# Patient Record
Sex: MALE
[De-identification: names, ages, dates, MRNs, and addresses within clinical notes are randomized per-mention and may not be internally consistent; named-entity substitution may affect disease eponyms.]

---

## 2017-04-03 NOTE — C.PDOC
History Of Present Illness


67 year old male with PMH DM, HTN, alcohol abuse presents to the ED with 

complaints of swelling to his legs and face for the past 3 days. Patient states 

he occasionally feels dizzy, headache and a bit nauseous. According to daughter

, he had kidney problems many years ago but it has since self-resolved. His 

blood sugar is at baseline at 180. Denies vomiting, chest pain, SOB, rash, 

itching, throat swelling, lip or tongue swelling, or any other complaints at 

this time. NO known allergens, new medication or food. (-) change in vision (-) 

photophobia 





Time Seen by Provider: 17 16:28


Chief Complaint (Nursing): Lower Extremity Problem/Injury


History Per: Patient, Family (Daughter acting as )


History/Exam Limitations: language barrier


Onset/Duration Of Symptoms: Days


Current Symptoms Are (Timing): Still Present


Severity: Mild





Past Medical History


Reviewed: Historical Data, Nursing Documentation, Vital Signs


Vital Signs: 


 Last Vital Signs











Temp  98.4 F   17 17:36


 


Pulse  70   17 17:36


 


Resp  20   17 17:36


 


BP  155/88 H  17 17:36


 


Pulse Ox  97   17 13:43














- Medical History


PMH: Anemia, Diabetes, HTN, Pancreatitis


Surgical History: Appendectomy


Family History: States: Unknown Family Hx





- Social History


Hx Tobacco Use: No


Hx Alcohol Use: Yes


Hx Substance Use: No





- Immunization History


Hx Tetanus Toxoid Vaccination: No


Hx Influenza Vaccination: No


Hx Pneumococcal Vaccination: No





Review Of Systems


Except As Marked, All Systems Reviewed And Found Negative.


Constitutional: Positive for: Other (+Swelling to hands and face).  Negative for

: Fever, Chills


Cardiovascular: Negative for: Chest Pain, Palpitations


Respiratory: Negative for: Shortness of Breath


Gastrointestinal: Positive for: Nausea.  Negative for: Vomiting, Abdominal Pain


Skin: Negative for: Rash


Neurological: Positive for: Dizziness.  Negative for: Weakness, Numbness





Physical Exam





- Physical Exam


Appears: Non-toxic, No Acute Distress


Skin: Normal Color, Warm, Dry, No Rash


Head: Atraumatic, Normacephalic, Other (No extensive facial swelling noted)


Eye(s): bilateral: Normal Inspection, EOMI


Nose: Normal


Oral Mucosa: Moist


Neck: Supple


Chest: Symmetrical, No Deformity


Cardiovascular: Rhythm Regular


Respiratory: Normal Breath Sounds, No Accessory Muscle Use, No Rales, No Rhonchi

, No Wheezing


Gastrointestinal/Abdominal: Soft


Extremity: Normal ROM, Pedal Edema (+Pitting pedal edema), No Deformity


Pulses: Left Radial: Normal, Right Radial: Normal


Neurological/Psych: Oriented x3, Normal Speech, Normal Cognition, Normal Motor, 

Normal Sensation





ED Course And Treatment





- Laboratory Results


Result Diagrams: 


 17 08:18





 17 08:18


ECG: Interpreted By Me, Viewed By Me


ECG Rhythm: Sinus Rhythm, Nonspecific Changes


Rate From EC


O2 Sat by Pulse Oximetry: 97 (Room air)


Pulse Ox Interpretation: Normal


Progress Note: CXR, EKG, Blood work, and Urinalysis ordered and reviewed.  Case 

discussed with Dr. Fung who agreed with plan and admission.





Disposition





- Disposition


Disposition: HOSPITALIZED


Disposition Time: 17:00


Condition: STABLE





- Clinical Impression


Clinical Impression: 


 ETOH abuse, Thrombocytopenia, Leg edema, Diabetes, Dizziness





- PA / NP / Resident Statement


MD/DO has reviewed & agrees with the documentation as recorded.





- Scribe Statement


The provider has reviewed the documentation as recorded by the Scribe


Kevyn Jaffe.





All medical record entries made by the Scribe were at my direction and 

personally dictated by me. I have reviewed the chart and agree that the record 

accurately reflects my personal performance of the history, physical exam, 

medical decision making, and the department course for this patient. I have 

also personally directed, reviewed, and agree with the discharge instructions 

and disposition.

## 2017-04-03 NOTE — CP.PCM.HP
History of Present Illness





- History of Present Illness


History of Present Illness: 


CC: "leg swelling"


67 year old male with PMHx of DM, HTN, alcohol abuse, presents to the ED with 

complaints of facial and LE swelling. Swelling began 3-4 days ago and has 

progressively become worse. He states this happened once 6 months ago and went 

to see his PMD, Dr. Fung. He does not report any new medications or new foods. 

He denies chest pain, SOB, cough or palpitations. He denies pruritus, sore 

throat or throat swelling. Admits to occasional ALEXANDRA and can only climb half way 

up a flight of stairs before having to stop to catch his breath. He sleeps with 

1-2 pillows a night, but reports he is able to lay flat. He denies waking up in 

the middle of the night gasping for air. Denies abdominal pain, nausea, vomiting

, diarrhea, and is sometimes constipated. He also reports he has been 

experiencing headaches for the past month. Headaches wake him from sleep at 

around 3 am and last 3-4 hours. They have been occurring for 3-4 times a week 

and are relieved with Motrin. He denies photophobia, history of migraines, 

lacrimation, neck pain, blurry vision. 





PMHx: ETOH abuse, ETOH Pancreatitis, DM, HTN


Medications: Glimiperide 4 mg PO BID, Lisinopril 20 mg PO daily, Lasix 40 mg PO 

daily, Propranalol 20 mg PO TID, KCl 20 mg PO BID, Humulin N sliding scale. 


PSHx: appendectomy


Family hx: Both parents , father with DM. 


Social hx: Drinks about 6-9 shots of vodka daily. Last drink was last night at 

10 pm. Former smoker, quit 20 yrs ago, denies drug use. Lives with family, 

retired. 


Allergies: NKDA. 


PMD: Dr. Fung





Present on Admission





- Present on Admission


Any Indicators Present on Admission: No





Review of Systems





- Constitutional


Constitutional: absent: Chills, Fatigue, Fever





- EENT


Eyes: absent: Blurred Vision, Change in Vision, Irritation, Photophobia


Ears: Dizziness


Nose/Mouth/Throat: absent: Sore Throat, Neck Pain





- Cardiovascular


Cardiovascular: Dyspnea on Exertion, Leg Edema, Pedal Edema.  absent: Chest Pain

, Chest Pain at Rest, Dyspnea





- Respiratory


Respiratory: Dyspnea on Exertion.  absent: Cough, Dyspnea





- Gastrointestinal


Gastrointestinal: Constipation.  absent: Abdominal Pain, Diarrhea, Nausea, 

Vomiting





- Genitourinary


Genitourinary: absent: Difficulty Urinating, Dysuria, Urinary Frequency, 

Urinary Urgency





- Musculoskeletal


Musculoskeletal: absent: Atrophy, Back Pain, Numbness, Tingling





- Integumentary


Integumentary: absent: Bleeding Lesions, Skin Pain, Wounds





- Neurological


Neurological: Dizziness, Headaches.  absent: Numbness, Paresthesias, Syncope, 

Tingling





- Psychiatric


Psychiatric: absent: Confusion, Depression





- Endocrine


Endocrine: absent: Fatigue, Palpitations, Polydipsia, Polyphagia, Polyuria





Past Patient History





- Infectious Disease


Hx of Infectious Diseases: None





- Past Medical History & Family History


Past Medical History?: Yes





- Past Social History


Smoking Status: Never Smoked





- CARDIAC


Hx Hypertension: Yes





- PULMONARY


Hx Respiratory Disorders: No





- NEUROLOGICAL


Hx Neurological Disorder: No





- HEENT


Hx HEENT Problems: No





- RENAL


Hx Chronic Kidney Disease: No





- ENDOCRINE/METABOLIC


Hx Endocrine Disorders: Yes


Hx Diabetes Mellitus Type 1: Yes


Hx Diabetes Mellitus Type 2: Yes





- HEMATOLOGICAL/ONCOLOGICAL


Hx Anemia: Yes





- INTEGUMENTARY


Hx Dermatological Problems: No





- MUSCULOSKELETAL/RHEUMATOLOGICAL


Hx Musculoskeletal Disorders: No





- GASTROINTESTINAL


Hx Pancreatitis: Yes





- GENITOURINARY/GYNECOLOGICAL


Hx Genitourinary Disorders: No





- PSYCHIATRIC


Hx Substance Use: No





- SURGICAL HISTORY


Hx Appendectomy: Yes





- ANESTHESIA


Hx Anesthesia: Yes


Hx Anesthesia Reactions: No





Meds


Allergies/Adverse Reactions: 


 Allergies











Allergy/AdvReac Type Severity Reaction Status Date / Time


 


No Known Allergies Allergy   Verified 17 15:40














Physical Exam





- Constitutional


Appears: No Acute Distress





- Head Exam


Head Exam: NORMAL INSPECTION, NORMOCEPHALIC





- Eye Exam


Eye Exam: EOMI, Scleral icterus


Pupil Exam: PERRL


Additional comments: 


periorbital erythema





- ENT Exam


ENT Exam: Mucous Membranes Moist, Normal Oropharynx





- Neck Exam


Neck exam: Positive for: Full Rom, Normal Inspection





- Respiratory Exam


Respiratory Exam: Clear to Auscultation Bilateral.  absent: Rales, Rhonchi, 

Wheezes





- Cardiovascular Exam


Cardiovascular Exam: REGULAR RHYTHM, +S1, +S2





- GI/Abdominal Exam


GI & Abdominal Exam: Normal Bowel Sounds, Soft.  absent: Distended, Tenderness





- Extremities Exam


Extremities exam: Positive for: pedal edema, tenderness (righ le), pedal pulses 

present





- Back Exam


Back exam: NORMAL INSPECTION





- Neurological Exam


Neurological exam: Alert, CN II-XII Intact, Oriented x3





- Psychiatric Exam


Psychiatric exam: Normal Affect, Normal Mood





- Skin


Skin Exam: Dry, Normal Color, Warm





Results





- Vital Signs


Recent Vital Signs: 





 Last Vital Signs











Temp  98.3 F   17 15:45


 


Pulse  78   17 15:45


 


Resp  20   17 15:45


 


BP  178/97 H  17 15:45


 


Pulse Ox  97   17 18:46














- Labs


Result Diagrams: 


 17 18:05





 17 18:05





Assessment & Plan


(1) Leg edema


Assessment and Plan: 


Patient denies history of heart failure. Patient given Lasix 40 in the ED. 


EKG on admission shows NSR72 bpm. 


BNP  604 (152 in )


Albumin 3.8 (normal)


f/u ECHO, none on file. 





Start the following medications:


Lasix 40 mg IVP BID


Propranolol 20 mg PO TID


Hold ACEi for NITISH





I/O's


Daily weights


Low Sodium Diet


Status: Acute





(2) Headache


Assessment and Plan: 


f/u head CT


control BP


Status: Acute





(3) ETOH abuse


Assessment and Plan: 


Librium taper


Librium 25 mg IVP PRN Q4H


Ativan 


Thiamine PO daily 


MV PO daily


Folic Acid PO daily





Monitor for withdrawal. Last drink 10 pm on 17.


Aspiration precautions


Seizure precautions


Status: Chronic





(4) Thrombocytopenia


Assessment and Plan: 


Likely secondary to BM suppression due to alcohol abuse. 


Status: Acute





(5) Bilirubinemia


Assessment and Plan: 


DARI Galarza: 2.1


Patient with history of 2 pancreatic masses as seen on MRCP  with need for 

3 month follow up. 


f/u direct and total bili


f/u abd US





Status: Acute





(6) Neutropenia


Assessment and Plan: 


Likely secondary to BM suppression due to alcohol abuse. 


Status: Acute





(7) Elevated LFTs


Status: Acute





(8) Creatinine elevation


Assessment and Plan: 


NS at 100 cc/hr


f/u CMP in the AM


Status: Acute





(9) Diabetes


Assessment and Plan: 


Amyril 4 mg PO BID


ISS


ACCUCHECKS


f/u Hgb A1C


Status: Chronic





(10) HTN (hypertension)


Assessment and Plan: 


Propranolol 20 mg PO TID


Hold ACEi for NITISH


Continue Lasix


Status: Chronic





(11) Prophylactic measure


Assessment and Plan: 


SCDs contraindicated


Protonix 40 mg PO daily


May start chemical AC once head CT shows no IC bleed. 





All orders as per Dr. Fung


Status: Acute

## 2017-04-04 NOTE — CP.PCM.DIS
Provider





- Provider


Date of Admission: 


04/03/17 18:40





Attending physician: 


Marlo Fung Jr, MD





Primary care physician: 


Antonieta   


Time Spent in preparation of Discharge (in minutes): 29





Hospital Course





- Lab Results


Lab Results: 


 Micro Results





04/03/17 Unknown   Urine   Urine Culture - Preliminary


                                No growth.





 Most Recent Lab Values











WBC  2.8 K/uL (4.8-10.8)  L  04/04/17  08:18    


 


RBC  3.75 Mil/uL (4.40-5.90)  L  04/04/17  08:18    


 


Hgb  11.1 g/dL (12.0-18.0)  L  04/04/17  08:18    


 


Hct  34.5 % (35.0-51.0)  L  04/04/17  08:18    


 


MCV  92.2 fL (80.0-94.0)   04/04/17  08:18    


 


MCH  29.7 pg (27.0-31.0)   04/04/17  08:18    


 


MCHC  32.2 g/dL (33.0-37.0)  L  04/04/17  08:18    


 


RDW  16.1 % (11.5-14.5)  H  04/04/17  08:18    


 


Plt Count  82 K/uL (130-400)  L  04/04/17  08:18    


 


MPV  8.3 fL (7.2-11.7)   04/04/17  08:18    


 


Neut % (Auto)  57.8 % (50.0-75.0)   04/04/17  08:18    


 


Lymph % (Auto)  28.3 % (20.0-40.0)   04/04/17  08:18    


 


Mono % (Auto)  11.4 % (0.0-10.0)  H  04/04/17  08:18    


 


Eos % (Auto)  1.2 % (0.0-4.0)   04/04/17  08:18    


 


Baso % (Auto)  1.3 % (0.0-2.0)   04/04/17  08:18    


 


Neut #  1.6 K/uL (1.8-7.0)  L  04/04/17  08:18    


 


Lymph #  0.8 K/uL (1.0-4.3)  L  04/04/17  08:18    


 


Mono #  0.3 K/uL (0.0-0.8)   04/04/17  08:18    


 


Eos #  0.0 K/uL (0.0-0.7)   04/04/17  08:18    


 


Baso #  0.0 K/uL (0.0-0.2)   04/04/17  08:18    


 


Differential Comment     04/03/17  18:05    


 


Sodium  130 mmol/L (132-148)  L  04/04/17  08:18    


 


Potassium  3.9 mmol/L (3.6-5.2)   04/04/17  08:18    


 


Chloride  85 mmol/L ()  L  04/04/17  08:18    


 


Carbon Dioxide  30 mmol/L (22-30)   04/04/17  08:18    


 


Anion Gap  18  (10-20)   04/04/17  08:18    


 


BUN  16 mg/dL (9-20)   04/04/17  08:18    


 


Creatinine  1.7 MG/DL (0.8-1.5)  H  04/04/17  08:18    


 


Est GFR ( Amer)  49   04/04/17  08:18    


 


Est GFR (Non-Af Amer)  40   04/04/17  08:18    


 


POC Glucose (mg/dL)  304 mg/dL ()  H  04/04/17  11:31    


 


Random Glucose  169 mg/dL ()  H  04/04/17  08:18    


 


Hemoglobin A1c  8.0 % (4.2-6.5)  H  04/04/17  08:18    


 


Calcium  8.5 mg/dl (8.6-10.4)  L  04/04/17  08:18    


 


Total Bilirubin  2.1 mg/dL (0.2-1.3)  H  04/04/17  08:18    


 


Direct Bilirubin  0.8 mg/dL (0.0-0.4)  H  04/04/17  08:18    


 


AST  113 U/L (17-59)  H D 04/04/17  08:18    


 


ALT  69 U/L (21-72)   04/04/17  08:18    


 


Alkaline Phosphatase  70 U/L ()   04/04/17  08:18    


 


Total Creatine Kinase  91 U/L ()   04/03/17  18:05    


 


CK-MB (Mass)  1.65 ng/mL (0.0-3.38)   04/03/17  18:05    


 


Troponin I  0.0200 ng/mL (0.00-0.120)   04/03/17  18:05    


 


NT-Pro-B Natriuret Pep  604 pg/mL (0-900)   04/03/17  18:05    


 


Total Protein  6.1 g/dL (6.3-8.3)  L  04/04/17  08:18    


 


Albumin  3.3 g/dL (3.5-5.0)  L  04/04/17  08:18    


 


Globulin  2.8 gm/dL (2.2-3.9)   04/04/17  08:18    


 


Albumin/Globulin Ratio  1.2  (1.0-2.1)   04/04/17  08:18    


 


Triglycerides  93 mg/dL (0-149)   04/04/17  08:18    


 


Cholesterol  152 mg/dL (0-199)   04/04/17  08:18    


 


LDL Cholesterol Direct  47 mg/dL (0-129)   04/04/17  08:18    


 


HDL Cholesterol  87 mg/dL (30-70)  H  04/04/17  08:18    


 


TSH 3rd Generation  3.48 mIU/L (0.46-4.68)   04/04/17  08:18    


 


Urine Color  Straw  (YELLOW)   04/03/17  18:05    


 


Urine Clarity  Clear  (Clear)   04/03/17  18:05    


 


Urine pH  9.0  (5.0-8.0)   04/03/17  18:05    


 


Ur Specific Gravity  1.004  (1.003-1.030)   04/03/17  18:05    


 


Urine Protein  Negative mg/dL (NEGATIVE)   04/03/17  18:05    


 


Urine Glucose (UA)  Normal mg/dL (Normal)   04/03/17  18:05    


 


Urine Ketones  Trace mg/dL (NEGATIVE)   04/03/17  18:05    


 


Urine Blood  1+  (NEGATIVE)  H  04/03/17  18:05    


 


Urine Nitrate  Negative  (NEGATIVE)   04/03/17  18:05    


 


Urine Bilirubin  Negative  (NEGATIVE)   04/03/17  18:05    


 


Urine Urobilinogen  2.0 mg/dL (0.2-1.0)   04/03/17  18:05    


 


Ur Leukocyte Esterase  Neg Dahlia/uL (Negative)   04/03/17  18:05    


 


Urine RBC (Auto)  7 /hpf (0-3)  H  04/03/17  18:05    














- Hospital Course


Hospital Course: 


On hospital admission





67 year old male with PMHx of DM, HTN, alcohol abuse, presents to the ED with 

complaints of facial and LE swelling. Swelling began 3-4 days ago and has 

progressively become worse. He states this happened once 6 months ago and went 

to see his PMD, Dr. Fung. He does not report any new medications or new foods. 

He denies chest pain, SOB, cough or palpitations. He denies pruritus, sore 

throat or throat swelling. Admits to occasional ALEXANDRA and can only climb half way 

up a flight of stairs before having to stop to catch his breath. He sleeps with 

1-2 pillows a night, but reports he is able to lay flat. He denies waking up in 

the middle of the night gasping for air. Denies abdominal pain, nausea, vomiting

, diarrhea, and is sometimes constipated. He also reports he has been 

experiencing headaches for the past month. Headaches wake him from sleep at 

around 3 am and last 3-4 hours. They have been occurring for 3-4 times a week 

and are relieved with Motrin. He denies photophobia, history of migraines, 

lacrimation, neck pain, blurry vision. 





On hospital course





Pt admitted with lower extremity edema and headache, complicated by hx of 

alcohol abuse. Pt denied and hx of CHF but work up was started with ordering an 

echo and reviewing labs. He was diuresed and started on propranolol. Lower 

extremity doplers were also ordered to r/o any DVT. A head CT was ordered to r/

o any organic causes of his headache but was normal. The following day the pt 

reported that his headache had resolved and he had no more complaints. Pt was 

discharged in stable condition with instructions to follow up with Dr Fung in 

his office where all results that were still pending could be reviewed. 





Diagnoses





Headache


Lower extremity edema


Etoh abuse


Thrombocytopenia


Neutropenia


Bilirubinemia


Diabetes 


HTN





- Date & Time of H&P


Date of H&P: 04/03/17


Time of H&P: 20:17





Discharge Exam





- Head Exam


Head Exam: NORMAL INSPECTION, NORMOCEPHALIC





- Eye Exam


Eye Exam: Normal appearance


Pupil Exam: PERRL





- ENT Exam


ENT Exam: Mucous Membranes Moist





- Respiratory Exam


Respiratory Exam: Clear to PA & Lateral, UNREMARKABLE





- Cardiovascular Exam


Cardiovascular Exam: +S1, +S2





- GI/Abdominal Exam


GI & Abdominal Exam: Distended, Normal Bowel Sounds, Unremarkable.  absent: 

Tenderness





- Extremities Exam


Extremities exam: pedal edema (1+)





- Neurological Exam


Neurological exam: Alert, Oriented x3





- Skin


Skin Exam: Dry, Warm





Discharge Plan





- Follow Up Plan


Condition: GOOD


Disposition: HOME/ ROUTINE


Instructions:  Vertigo (DC), Dizziness (GEN), Edema (DC)


Additional Instructions: 


Follow up with Dr Fung in his office within the next couple of weeks and resume 

your home medications.


Referrals: 


Marlo Fung Jr., MD [Medical Doctor] -

## 2017-04-04 NOTE — US
HISTORY:

elevated tbilli, alcoholic



COMPARISON:

January 3, 2016.



TECHNIQUE:

Sonographic evaluation of the abdomen.



FINDINGS:



LIVER:

Measures 17.7 cm.  Hepatopedal blood flow. Fatty infiltration 

manifest ultrasonographically as increased echogenicity of the liver 

parenchyma. No mass. No intrahepatic bile duct dilatation.



GALLBLADDER:

Unremarkable. No gallstones.



COMMON BILE DUCT:

Measures 4.7 mm. No stones. No dilatation.



PANCREAS:

Obscured by overlying bowel gas. Non diagnostic assessment of the 

pancreas



RIGHT KIDNEY:

Measures 11.8 x 5.4cm. Normal echogenicity. No calculus, mass, or 

hydronephrosis.



LEFT KIDNEY:

Measures 11.7 x 5.8cm. Normal echogenicity. No calculus, mass, or 

hydronephrosis.



SPLEEN:

Normal in size and contour. No mass.



AORTA:

No aneurysmal dilatation. 



IVC:

Unremarkable. 



OTHER FINDINGS:

None. 



IMPRESSION:

No acute findings related to/accounting  for the clinical 

presentation. No significant interval change compared to the prior 

examination(s). Additional benign and/or incidental findings 

described above.

## 2017-04-04 NOTE — RAD
PROCEDURE:  CHEST RADIOGRAPH, 1 VIEW



HISTORY:

SOB



COMPARISON:

Comparison is made to the previous study dated 08/08/2015



FINDINGS:



LUNGS:

No evidence of new infiltrate or consolidation in the lungs



PLEURA:

No pneumothorax or pleural fluid seen.



CARDIOVASCULAR:

Normal.



OSSEOUS STRUCTURES:

No significant abnormalities.



VISUALIZED UPPER ABDOMEN:

Normal.



OTHER FINDINGS:

None. 



IMPRESSION:

No active disease.

## 2017-04-04 NOTE — VASCLAB
PROCEDURE:  Lower Extremity Venous Duplex Exam.



HISTORY:

le edema 



PRIORS:

None. 



TECHNIQUE:

Bilateral common femoral, femoral, popliteal and posterior tibial, 

peroneal and great saphenous veins were evaluated. Flow was assessed 

with color Doppler, compressibility, assessment of phasic flow and 

augmentation response.



Report prepared by   FRANCISCA Jack, RVT



FINDINGS:



RIGHT:

1. Common Femoral Vein: 



1.1. Compressibility - Fully compressible: Thrombus -  None : Flow - 

Phasic: Augmentation -Normal: Reflux - None.



2. Femoral Vein:



2.1. Compressibility - Fully compressible: Thrombus -  None : Flow - 

Phasic: Augmentation -Normal: Reflux - None.



3. Popliteal Vein: 



3.1. Compressibility - Fully compressible: Thrombus - None :  Flow - 

Phasic: Augmentation -Normal: Reflux - None.



4. Posterior Tibial Vein: 



4.1. Compressibility - Fully compressible: Thrombus -  None: Flow - 

Phasic: Augmentation -Normal: Reflux - None.



5. Peroneal Vein:



5.1. Compressibility - Fully compressible: Thrombus -  None: Flow - 

Phasic: Augmentation -Normal: Reflux - None.



6. Great Saphenous Vein:

6.1. Compressibility - Fully compressible: Thrombus - None: Flow - 

Phasic: Augmentation - Normal: Reflux - None.





LEFT:

1. Common Femoral Vein:



1.1.  Compressibility - Fully compressible: Thrombus -  None: Flow - 

Phasic: Augmentation -Normal: Reflux - None.



2. Femoral Vein:



2.1.  Compressibility - Fully compressible: Thrombus -  None: Flow - 

Phasic: Augmentation -Normal: Reflux - None.



3. Popliteal Vein:



3.1.  Compressibility - Fully compressible: Thrombus -  None : Flow - 

Phasic: Augmentation -Normal: Reflux - None.



4. Posterior Tibial Vein:



4.1.  Compressibility - Fully compressible: Thrombus -  None: Flow - 

Phasic: Augmentation -Normal: Reflux - None.



5. Peroneal Vein:



5.1.  Compressibility - Fully compressible: Thrombus -  None: Flow - 

Phasic: Augmentation -Normal: Reflux - None.



6. Great Saphenous Vein:

6.1.  Compressibility - Fully compressible: Thrombus -  None: Flow - 

Phasic: Augmentation - Normal: Reflux - None.





OTHER FINDINGS:  Right: None significant.



Left: None significant.



IMPRESSION:

Right: 



No evidence of deep or superficial vein thrombosis of the right lower 

extremity. Normal valve function noted of the right side.     



Left: 



No evidence of deep or superficial vein thrombosis of the left lower 

extremity. Normal valve function noted of the left side.

## 2017-04-04 NOTE — CT
PROCEDURE:  CT HEAD WITHOUT CONTRAST.



HISTORY:

headache, dizziness



COMPARISON:

Comparison is made to the previous study dated 07/16/2014 



TECHNIQUE:

Axial computed tomography images were obtained through the head/brain 

without intravenous contrast.  



Radiation dose:



Total exam DLP = 737.04 mGy-cm.



This CT exam was performed using one or more of the following dose 

reduction techniques: Automated exposure control, adjustment of the 

mA and/or kV according to patient size, and/or use of iterative 

reconstruction technique.



FINDINGS:



HEMORRHAGE:

No intracranial hemorrhage. 



BRAIN:

No mass effect or edema.  Moderate atrophy and mild chronic 

microvascular white matter ischemic disease are again noted.



VENTRICLES:

Unremarkable. No hydrocephalus. 



CALVARIUM:

Unremarkable.



PARANASAL SINUSES:

Re- demonstration of small mucosal retention cyst at the right renal 

sinus.



MASTOID AIR CELLS:

Unremarkable as visualized. No inflammatory changes.



OTHER FINDINGS:

None.



IMPRESSION:

No evidence of acute intracranial hemorrhage intracranial collection 

mass effect or midline shift.  No significant interval change 

compared to the previous exam.



Preliminary report was submitted by [facility].

## 2017-04-05 NOTE — CARD
--------------- APPROVED REPORT --------------





EKG Measurement

Heart [id number]

IN [id number]

[id number]

[id number]

[id number]



<Conclusion>

Normal sinus rhythm

Left axis deviation

Nonspecific ST and T wave abnormality

Abnormal ECG

## 2017-04-05 NOTE — CARD
--------------- APPROVED REPORT --------------





EXAM: Two-dimensional and M-mode echocardiogram with Doppler and 

color Doppler.



Other Information 

Quality : AverageRhythm : NSR



INDICATION

Dizziness and Vertigo 



RISK FACTORS

Hypertension 

Hyperlipidemia

Diabetes



M-Mode DIMENSIONS 

Left Atrium (MM)3.54   (2.5-4.0cm)IVSd1.11   (0.7-1.1cm)

Aortic Root3.58   (2.2-3.7cm)LVDd4.77   (4.0-5.6cm)

Aortic Cusp Exc.1.55   (1.5-2.0cm)PWd1.18   (0.7-1.1cm)

FS (%) 39   %LVDs2.92   (2.0-3.8cm)

LVEF (%)69   (>50%)



Mitral Valve

MV E Nwyavjza42.7cm/sMV A Qpxbccqr04.7cm/sE/A ratio1.0



TDI

E/Lateral E'0.0E/Medial E'0.0



Tricuspid Valve

TR Peak [id number]/sTR Peak [id number]



 LEFT VENTRICLE 

The left ventricle is normal size.

There is borderline concentric left ventricular hypertrophy.

Left ventricle systolic function is normal.

The Ejection Fraction is 65-70%.

There is normal LV segmental wall motion.

The left ventricular diastolic function is normal.



 RIGHT VENTRICLE 

The right ventricle is normal size.

There is normal right ventricular wall thickness.

The right ventricular systolic function is normal.



 ATRIA 

The left atrium size is normal.

The right atrium size is normal.

The interatrial septum is intact with no evidence for an atrial 

septal defect.



 AORTIC VALVE 

The aortic valve is normal in structure.

No aortic regurgitation is present.

There is no aortic valvular stenosis. 

There is no aortic valvular vegetation.



 MITRAL VALVE 

The mitral valve is normal in structure.

There is no evidence of mitral valve prolapse.

There is no mitral valve stenosis.

Mitral regurgitation is trace.



 TRICUSPID VALVE 

The tricuspid valve is normal in structure.

There is trace tricuspid regurgitation.

Right ventricular systolic pressure is estimated at less than 30 

mmHg. 

There is no pulmonary hypertension.



 PULMONIC VALVE 

The pulmonic valve is not well visualized.

There is no pulmonic valvular regurgitation. 



 GREAT VESSELS 

The aortic root is normal in size.



 PERICARDIAL EFFUSION 

There is no significant  pericardial effusion.



<Conclusion>

Left ventricle systolic function is normal.

The Ejection Fraction is 65-70%.

There is borderline concentric left ventricular hypertrophy.

No aortic regurgitation is present.

Mitral regurgitation is trace.

There is trace tricuspid regurgitation.

There is no pulmonary hypertension.

There is no pulmonic valvular regurgitation.

## 2018-01-21 ENCOUNTER — HOSPITAL ENCOUNTER (INPATIENT)
Dept: HOSPITAL 31 - C.ER | Age: 68
LOS: 16 days | Discharge: NURSING, REHAB OR LONG-TERM CARE | DRG: 438 | End: 2018-02-06
Attending: INTERNAL MEDICINE | Admitting: INTERNAL MEDICINE
Payer: MEDICARE

## 2018-01-21 VITALS — BODY MASS INDEX: 29.5 KG/M2

## 2018-01-21 DIAGNOSIS — F10.20: ICD-10-CM

## 2018-01-21 DIAGNOSIS — K85.20: Primary | ICD-10-CM

## 2018-01-21 DIAGNOSIS — K64.8: ICD-10-CM

## 2018-01-21 DIAGNOSIS — Z87.891: ICD-10-CM

## 2018-01-21 DIAGNOSIS — E46: ICD-10-CM

## 2018-01-21 DIAGNOSIS — B37.81: ICD-10-CM

## 2018-01-21 DIAGNOSIS — D69.59: ICD-10-CM

## 2018-01-21 DIAGNOSIS — E83.51: ICD-10-CM

## 2018-01-21 DIAGNOSIS — E87.1: ICD-10-CM

## 2018-01-21 DIAGNOSIS — I10: ICD-10-CM

## 2018-01-21 DIAGNOSIS — E11.65: ICD-10-CM

## 2018-01-21 DIAGNOSIS — J18.9: ICD-10-CM

## 2018-01-21 DIAGNOSIS — Z79.4: ICD-10-CM

## 2018-01-21 DIAGNOSIS — K86.0: ICD-10-CM

## 2018-01-21 DIAGNOSIS — Z90.49: ICD-10-CM

## 2018-01-21 DIAGNOSIS — D61.818: ICD-10-CM

## 2018-01-21 DIAGNOSIS — E87.6: ICD-10-CM

## 2018-01-21 DIAGNOSIS — K51.50: ICD-10-CM

## 2018-01-21 DIAGNOSIS — K59.00: ICD-10-CM

## 2018-01-21 DIAGNOSIS — K25.9: ICD-10-CM

## 2018-01-21 DIAGNOSIS — K76.0: ICD-10-CM

## 2018-01-21 DIAGNOSIS — K70.30: ICD-10-CM

## 2018-01-21 LAB
ALBUMIN SERPL-MCNC: 4.2 G/DL (ref 3.5–5)
ALBUMIN/GLOB SERPL: 1.1 {RATIO} (ref 1–2.1)
ALT SERPL-CCNC: 37 U/L (ref 21–72)
AST SERPL-CCNC: 102 U/L (ref 17–59)
BACTERIA #/AREA URNS HPF: (no result) /[HPF]
BASOPHILS # BLD AUTO: 0.1 K/UL (ref 0–0.2)
BASOPHILS NFR BLD: 1.2 % (ref 0–2)
BILIRUB UR-MCNC: NEGATIVE MG/DL
BUN SERPL-MCNC: 13 MG/DL (ref 9–20)
CALCIUM SERPL-MCNC: 11.9 MG/DL (ref 8.6–10.4)
EOSINOPHIL # BLD AUTO: 0 K/UL (ref 0–0.7)
EOSINOPHIL NFR BLD: 0 % (ref 0–4)
ERYTHROCYTE [DISTWIDTH] IN BLOOD BY AUTOMATED COUNT: 25 % (ref 11.5–14.5)
GFR NON-AFRICAN AMERICAN: 55
GLUCOSE UR STRIP-MCNC: (no result) MG/DL
HGB BLD-MCNC: 9.9 G/DL (ref 12–18)
HYALINE CASTS #/AREA URNS LPF: (no result) /LPF (ref 0–2)
LEUKOCYTE ESTERASE UR-ACNC: (no result) LEU/UL
LIPASE: 2310 U/L (ref 23–300)
LYMPHOCYTES # BLD AUTO: 0.9 K/UL (ref 1–4.3)
LYMPHOCYTES NFR BLD AUTO: 14.3 % (ref 20–40)
MCH RBC QN AUTO: 28.1 PG (ref 27–31)
MCHC RBC AUTO-ENTMCNC: 32.1 G/DL (ref 33–37)
MCV RBC AUTO: 87.6 FL (ref 80–94)
MONOCYTES # BLD: 0.3 K/UL (ref 0–0.8)
MONOCYTES NFR BLD: 4.3 % (ref 0–10)
NEUTROPHILS # BLD: 5.1 K/UL (ref 1.8–7)
NEUTROPHILS NFR BLD AUTO: 80.2 % (ref 50–75)
NRBC BLD AUTO-RTO: 0.1 % (ref 0–2)
PH UR STRIP: 7 [PH] (ref 5–8)
PLATELET # BLD: 136 K/UL (ref 130–400)
PMV BLD AUTO: 7.6 FL (ref 7.2–11.7)
PROT UR STRIP-MCNC: (no result) MG/DL
RBC # BLD AUTO: 3.54 MIL/UL (ref 4.4–5.9)
RBC # UR STRIP: (no result) /UL
SP GR UR STRIP: 1.01 (ref 1–1.03)
URINE NITRATE: NEGATIVE
UROBILINOGEN UR-MCNC: NORMAL MG/DL (ref 0.2–1)
WBC # BLD AUTO: 6.4 K/UL (ref 4.8–10.8)

## 2018-01-21 RX ADMIN — HUMAN INSULIN SCH: 100 INJECTION, SOLUTION SUBCUTANEOUS at 21:42

## 2018-01-21 NOTE — CP.PCM.HP
History of Present Illness





- History of Present Illness


History of Present Illness: 


CC: "Vomiting"





HPI: 67 year old male with past medical history of alcohol abuse, pancreatitis, 

DM and HTN presents to the ED for vomiting.  He states he has been vomiting for 

the past 2 days.  He has vomited about 5-6x per day.  He states he tried Tums 

which helped at first but it stopped helping. Patient states he has pain in his 

stomach that is burning and constant.  The pain is currently a 1/10.  Patient 

states his last meal was at 1pm yesterday.  He also states his last drink was 

yesterday.  He denies withdrawal symptoms due to drinking.  Patient denies fever

, diarrhea, constipation, chest pain, difficulty breathing, or dysuria. 





PMD: Dr. Fung


PMHx: ETOH abuse, ETOH Pancreatitis, DM, HTN


Medications: Glimiperide 4 mg PO BID, Lisinopril 20 mg PO daily, Lasix 40 mg PO 

daily, Propranalol 20 mg PO TID, KCl 20 mg PO BID, Humulin N sliding scale. 


PSHx: appendectomy


Family hx: Both parents , father with DM. 


Social hx: Drinks about 6-9 shots of vodka daily. Last drink was yesterday. 

Former smoker, quit 20 yrs ago, denies drug use. Lives with family, retired. 


Allergies: NKDA. 








Present on Admission





- Present on Admission


Any Indicators Present on Admission: No





Review of Systems





- Constitutional


Constitutional: absent: Chills, Fever





- Cardiovascular


Cardiovascular: absent: Chest Pain, Dyspnea





- Respiratory


Respiratory: absent: Dyspnea





- Gastrointestinal


Gastrointestinal: Abdominal Pain, Nausea, Vomiting.  absent: Constipation, 

Diarrhea, Hematemesis





- Genitourinary


Genitourinary: absent: Dysuria





Past Patient History





- Infectious Disease


Hx of Infectious Diseases: None





- Past Medical History & Family History


Past Medical History?: Yes





- Past Social History


Smoking Status: Former Smoker





- CARDIAC


Hx Hypertension: Yes





- PULMONARY


Hx Respiratory Disorders: No





- NEUROLOGICAL


Hx Neurological Disorder: No





- HEENT


Hx HEENT Problems: No





- RENAL


Hx Chronic Kidney Disease: No





- ENDOCRINE/METABOLIC


Hx Diabetes Mellitus Type 1: Yes


Hx Diabetes Mellitus Type 2: Yes





- HEMATOLOGICAL/ONCOLOGICAL


Hx Anemia: Yes





- INTEGUMENTARY


Hx Dermatological Problems: No





- MUSCULOSKELETAL/RHEUMATOLOGICAL


Hx Musculoskeletal Disorders: No


Hx Falls: No





- GASTROINTESTINAL


Hx Pancreatitis: Yes





- GENITOURINARY/GYNECOLOGICAL


Hx Genitourinary Disorders: No





- PSYCHIATRIC


Hx Substance Use: No





- SURGICAL HISTORY


Hx Appendectomy: Yes





- ANESTHESIA


Hx Anesthesia: Yes


Hx Anesthesia Reactions: No





Meds


Allergies/Adverse Reactions: 


 Allergies











Allergy/AdvReac Type Severity Reaction Status Date / Time


 


No Known Allergies Allergy   Verified 18 14:12














Physical Exam





- Constitutional


Appears: In Acute Distress (patient vomited x2 at bedside during exam )





- Head Exam


Head Exam: ATRAUMATIC, NORMAL INSPECTION





- Eye Exam


Eye Exam: Scleral icterus





- ENT Exam


ENT Exam: Mucous Membranes Dry





- Respiratory Exam


Respiratory Exam: Clear to Auscultation Bilateral, NORMAL BREATHING PATTERN





- Cardiovascular Exam


Cardiovascular Exam: Tachycardia, +S1, +S2





- GI/Abdominal Exam


GI & Abdominal Exam: Normal Bowel Sounds, Soft.  absent: Distended, Firm, 

Guarding, Tenderness





- Extremities Exam


Extremities exam: Positive for: normal inspection





- Neurological Exam


Neurological exam: Alert, Oriented x3





- Psychiatric Exam


Psychiatric exam: Normal Affect, Normal Mood





- Skin


Skin Exam: Normal Color, Warm





Results





- Vital Signs


Recent Vital Signs: 





 Last Vital Signs











Temp  97.9 F   18 17:43


 


Pulse  100 H  18 17:43


 


Resp  20   18 17:43


 


BP  199/107 H  18 17:43


 


Pulse Ox  99   18 17:54














- Labs


Result Diagrams: 


 18 15:28





 18 15:28


Labs: 





 Laboratory Results - last 24 hr











  18





  15:28 15:28 16:30


 


WBC  6.4  


 


RBC  3.54 L  


 


Hgb  9.9 L  


 


Hct  31.0 L  


 


MCV  87.6  D  


 


MCH  28.1  


 


MCHC  32.1 L  


 


RDW  25.0 H  


 


Plt Count  136  D  


 


MPV  7.6  


 


Neut % (Auto)  80.2 H  


 


Lymph % (Auto)  14.3 L  


 


Mono % (Auto)  4.3  


 


Eos % (Auto)  0.0  


 


Baso % (Auto)  1.2  


 


Neut #  5.1  


 


Lymph #  0.9 L  


 


Mono #  0.3  


 


Eos #  0.0  


 


Baso #  0.1  


 


Sodium   131 L 


 


Potassium   4.2 


 


Chloride   88 L 


 


Carbon Dioxide   27 


 


Anion Gap   20 


 


BUN   13 


 


Creatinine   1.3 


 


Est GFR ( Amer)   > 60 


 


Est GFR (Non-Af Amer)   55 


 


Random Glucose   180 H 


 


Calcium   11.9 H 


 


Total Bilirubin   3.0 H 


 


AST   102 H 


 


ALT   37 


 


Alkaline Phosphatase   121 


 


Total Protein   7.8 


 


Albumin   4.2 


 


Globulin   3.7 


 


Albumin/Globulin Ratio   1.1 


 


Lipase   2310 H 


 


Urine Color    Yellow


 


Urine Clarity    Clear


 


Urine pH    7.0


 


Ur Specific Gravity    1.009


 


Urine Protein    1+ H


 


Urine Glucose (UA)    1+ H


 


Urine Ketones    Trace


 


Urine Blood    1+ H


 


Urine Nitrate    Negative


 


Urine Bilirubin    Negative


 


Urine Urobilinogen    Normal


 


Ur Leukocyte Esterase    Neg


 


Urine WBC (Auto)    < 1


 


Urine RBC (Auto)    4 H


 


Urine Bacteria    Rare


 


Hyaline Casts    3-5 H














Assessment & Plan





- Assessment and Plan (Free Text)


Assessment: 





(1) Acute pancreatitis


Likely secondary to alcohol 


NPO diet


- Imaging:


CT of Abdomen/Pelvis: Findings consistent with mild or early acute 

pancreatitis.  New low-density/cystic 1.3 cm mass in body of pancreas. 

Nonspecific.  Possible intrapancreatic pseudocyst from prior pancreatitis.  

Possible IPMN.  Follow-up advised. Fatty infiltration of the liver with sparing 

of the left lobe and mild atrophy of the left lobe. Mildly thickened bladder 

wall but there is poor distension of the urinary bladder.  Rule out cystitis. 

Nonobstructing tiny right renal calculus. Ill-defined opacity in right lower 

lobe.  Rule out pneumonia.  Circumferential mural thickening of distal 

esophagus.  Rule out neoplasm.  Consider evaluation with endoscopy when 

clinically feasible.


Lipase 2310


- Medications


* Start Morphine 1mg IV Q4H prn pain


* Start Zofran 4mg IVP Q8H prn N/V


* half NS IV fluids 


f/u AM Lipase, amylase, lipid profile 





(2) Hyponatremia


Hydration with half NS as above


f/u CMP





(3) Bilirubinemia


T. Geovanni: 3.0


f/u direct and total bili





(4) Elevated LFTs


Likely secondary to alcohol 





(5) HTN (hypertension)


Pt did not take medications today


Propranolol 20mg po TID


Lisinopril 20mg daily


Monitor BP





(6) Diabetes


Hold home medication of Glimepiride


Start ISS with Accuchecks Q6H


Monitor





(7) ETOH abuse


Ativan 


Thiamine PO daily 


MV PO daily


Folic Acid PO daily


Monitor for withdrawal. Last drink yesterday 18


Aspiration precautions


Seizure precautions





(9) Prophylactic measure


 Protonix 40mg IV daily


Heparin SC 


SCDs





Case discussed with Dr. Antonieta Monzon PGY-1

## 2018-01-21 NOTE — C.PDOC
History Of Present Illness


67-year-old male, presents to the emergency department with complaints of recur 

abd pain since yest. SIM TO PRIOR. +NV. DENIES GI BLEED. LAST DRINK YEST.





PMHx of DM, HTN, alcohol abuse





EXAM


NONTOXIC


HEENT +ICTERIC CLEAR; 


ABD SOFT NT ND ND NO R/G


NEURO INTACT NO TREMOR


PSYCH CALM COOPERATIVE NO INTOX


REMAINDER NEG


Time Seen by Provider: 01/21/18 14:15


History Per: Patient


History/Exam Limitations: no limitations


Onset/Duration Of Symptoms: Days


Current Symptoms Are (Timing): Still Present


Severity: Moderate





Past Medical History


Reviewed: Historical Data, Nursing Documentation, Vital Signs


Vital Signs: 


 Last Vital Signs











Temp  97.9 F   01/21/18 17:43


 


Pulse  100 H  01/21/18 17:43


 


Resp  20   01/21/18 17:43


 


BP  199/107 H  01/21/18 17:43


 


Pulse Ox  100   01/21/18 17:43














- Medical History


PMH: Anemia, Diabetes, HTN, Pancreatitis


   Denies: Chronic Kidney Disease


Surgical History: Appendectomy


Family History: States: No Known Family Hx





- Social History


Hx Tobacco Use: No


Hx Alcohol Use: Yes


Hx Substance Use: No





- Immunization History


Hx Tetanus Toxoid Vaccination: No


Hx Influenza Vaccination: No


Hx Pneumococcal Vaccination: No





Review Of Systems


Except As Marked, All Systems Reviewed And Found Negative.


Constitutional: Negative for: Fever, Chills


Cardiovascular: Negative for: Chest Pain, Palpitations


Respiratory: Negative for: Shortness of Breath


Gastrointestinal: Positive for: Nausea, Vomiting, Abdominal Pain.  Negative for

: Diarrhea


Musculoskeletal: Negative for: Back Pain


Neurological: Negative for: Weakness, Numbness, Headache, Dizziness





Physical Exam





- Physical Exam


Appears: Non-toxic, No Acute Distress, Other (CALM COOPERATIVE NO INTOX)


Skin: Warm, Dry, No Rash


Head: Atraumatic, Normacephalic


Eye(s): bilateral: PERRL, Other (+ICTERIC CLEAR)


Nose: Normal


Oral Mucosa: Moist


Lips: Normal Appearing


Neck: Normal ROM


Chest: Symmetrical


Cardiovascular: Rhythm Regular, No Murmur


Respiratory: Normal Breath Sounds, No Accessory Muscle Use


Gastrointestinal/Abdominal: Soft, No Tenderness, No Guarding, No Rebound


Extremity: Normal ROM


Neurological/Psych: Oriented x3, Normal Speech, Other (on RA)





ED Course And Treatment





- Laboratory Results


Result Diagrams: 


 01/21/18 15:28





 01/21/18 15:28


O2 Sat by Pulse Oximetry: 99





Progress





- Re-Evaluation


Re-evaluation Note: 





01/21/18 17:15


D/W DR FUNG AWARE OF ER FINDINGS WILL ADMIT. CT PENDING





- Data Reviewed


Data Reviewed: Lab, Diagnostic imaging, EKG, Old records





Disposition


Counseled Patient/Family Regarding: Studies Performed, Diagnosis





- Disposition


Disposition: HOSPITALIZED


Disposition Time: 17:15


Condition: STABLE





- POA


Present On Arrival: None





- Clinical Impression


Clinical Impression: 


 Pancreatitis, alcoholic, acute








- Scribe Statement


The provider has reviewed the documentation as recorded by the Scribe (Yecenia Murillo)








All medical record entries made by the Scribe were at my direction and 

personally dictated by me. I have reviewed the chart and agree that the record 

accurately reflects my personal performance of the history, physical exam, 

medical decision making, and the department course for this patient. I have 

also personally directed, reviewed, and agree with the discharge instructions 

and disposition.





Decision To Admit





- Pt Status Changed To:


Hospital Disposition Of: Inpatient





- Admit Certification


Admit to Inpatient:: After my assessment, the patient will require 

hospitalization for at least two midnights.  This is because of the severity of 

symptoms shown, intensity of services needed, and/or the medical risk in this 

patient being treated as an outpatient.





- InPatient:


Physician Admission Certification:: SEE NOTE





- .


Bed Request Type: Regular


Admitting Physician: Marlo Fung Jr.


Patient Diagnosis: 


 Pancreatitis, alcoholic, acute

## 2018-01-21 NOTE — CT
PROCEDURE:  CT Abdomen and Pelvis with contrast



HISTORY:

abd pain



COMPARISON:

1/2/2016



TECHNIQUE:

Contrast dose: 100 mL Visipaque 320



Radiation dose:



Total exam DLP = 881.70 mGy-cm.



This CT exam was performed using one or more of the following dose 

reduction techniques: Automated exposure control, adjustment of the 

mA and/or kV according to patient size, and/or use of iterative 

reconstruction technique.



FINDINGS:



LOWER THORAX:

Ill-defined opacity right lower lobe, possible pneumonia. Trace right 

pleural effusion. Circumferential mural thickening of the distal 

esophagus, nonspecific.  Consider endoscopy to rule out neoplasm. No 

hiatal hernia. 



LIVER:

Normal size.  Mild atrophy of left lobe.  Extensive fatty 

infiltration of the right lobe of liver, sparing the left lobe. 

Smooth contour.  No mass. No biliary dilatation. 



GALLBLADDER AND BILE DUCTS:

Unremarkable. 



PANCREAS:

Moderate pancreatic atrophy.  Hazy peripancreatic density consistent 

with acute pancreatitis. No fluid collection.  There is a 

low-density/cystic pancreatic mass measuring approximately 1.3 cm in 

greatest dimension, not evident on prior examination. There is a 

nodular pancreatic calcification in the body of the pancreas 

unchanged from prior examination. This is seen immediately adjacent 

to this cystic mass. No pancreatic ductal dilatation.



SPLEEN:

Unremarkable. 



ADRENALS:

Unremarkable. No mass. 



KIDNEYS AND URETERS:

Nonobstructing 2 mm calculus lower pole right kidney. Unchanged.  No 

renal mass or hydronephrosis. 



VASCULATURE:

Unremarkable. No aortic aneurysm. 



BOWEL:

Unremarkable. No obstruction. No gross mural thickening. 



APPENDIX:

Not identified. No secondary findings to suggest acute appendicitis. 



PERITONEUM:

Unremarkable. No free fluid. No free air. 



LYMPH NODES:

Unremarkable. No enlarged lymph nodes. 



BLADDER:

Poorly distended.  Diffuse bladder wall thickening may be due to 

inadequate distention. Rule out cystitis. 



REPRODUCTIVE:

Mild prostate enlargement. 



BONES:

No acute fracture. 



OTHER FINDINGS:

None.



IMPRESSION:

Findings consistent with mild or early acute pancreatitis.  New 

low-density/cystic 1.3 cm mass in body of pancreas. Nonspecific.  

Possible intrapancreatic pseudocyst from prior pancreatitis.  

Possible IPMN.  Follow-up advised. Fatty infiltration of the liver 

with sparing of the left lobe and mild atrophy of the left lobe. 

Mildly thickened bladder wall but there is poor distension of the 

urinary bladder.  Rule out cystitis. Nonobstructing tiny right renal 

calculus. Ill-defined opacity in right lower lobe.  Rule out 

pneumonia.  Circumferential mural thickening of distal esophagus.  

Rule out neoplasm.  Consider evaluation with endoscopy when 

clinically feasible.

## 2018-01-22 LAB
ALBUMIN SERPL-MCNC: 3.3 G/DL (ref 3.5–5)
ALBUMIN/GLOB SERPL: 1 {RATIO} (ref 1–2.1)
ALT SERPL-CCNC: 26 U/L (ref 21–72)
AMYLASE SERPL-CCNC: 200 U/L (ref 30–110)
AST SERPL-CCNC: 48 U/L (ref 17–59)
BASOPHILS # BLD AUTO: 0 K/UL (ref 0–0.2)
BASOPHILS NFR BLD: 0.6 % (ref 0–2)
BILIRUB DIRECT SERPL-MCNC: 1 MG/DL (ref 0–0.4)
BUN SERPL-MCNC: 17 MG/DL (ref 9–20)
CALCIUM SERPL-MCNC: 11 MG/DL (ref 8.6–10.4)
EOSINOPHIL # BLD AUTO: 0 K/UL (ref 0–0.7)
EOSINOPHIL NFR BLD: 0.4 % (ref 0–4)
ERYTHROCYTE [DISTWIDTH] IN BLOOD BY AUTOMATED COUNT: 25.7 % (ref 11.5–14.5)
GFR NON-AFRICAN AMERICAN: 40
HDLC SERPL-MCNC: 67 MG/DL (ref 30–70)
HGB BLD-MCNC: 8.2 G/DL (ref 12–18)
LDLC SERPL-MCNC: 42 MG/DL (ref 0–129)
LYMPHOCYTES # BLD AUTO: 0.6 K/UL (ref 1–4.3)
LYMPHOCYTES NFR BLD AUTO: 11.5 % (ref 20–40)
MAGNESIUM SERPL-MCNC: 1.3 MG/DL (ref 1.6–2.3)
MCH RBC QN AUTO: 28.2 PG (ref 27–31)
MCHC RBC AUTO-ENTMCNC: 32 G/DL (ref 33–37)
MCV RBC AUTO: 88.3 FL (ref 80–94)
MONOCYTES # BLD: 0.2 K/UL (ref 0–0.8)
MONOCYTES NFR BLD: 4.2 % (ref 0–10)
NEUTROPHILS # BLD: 4.5 K/UL (ref 1.8–7)
NEUTROPHILS NFR BLD AUTO: 83.3 % (ref 50–75)
NRBC BLD AUTO-RTO: 0.2 % (ref 0–2)
PLATELET # BLD: 121 K/UL (ref 130–400)
PMV BLD AUTO: 7.7 FL (ref 7.2–11.7)
RBC # BLD AUTO: 2.92 MIL/UL (ref 4.4–5.9)
WBC # BLD AUTO: 5.4 K/UL (ref 4.8–10.8)

## 2018-01-22 RX ADMIN — HUMAN INSULIN SCH: 100 INJECTION, SOLUTION SUBCUTANEOUS at 22:35

## 2018-01-22 RX ADMIN — HUMAN INSULIN SCH UNIT: 100 INJECTION, SOLUTION SUBCUTANEOUS at 17:55

## 2018-01-22 RX ADMIN — HUMAN INSULIN SCH UNIT: 100 INJECTION, SOLUTION SUBCUTANEOUS at 08:14

## 2018-01-22 RX ADMIN — Medication SCH ML: at 10:18

## 2018-01-22 RX ADMIN — POTASSIUM CHLORIDE SCH MEQ: 20 TABLET, EXTENDED RELEASE ORAL at 10:18

## 2018-01-22 RX ADMIN — HUMAN INSULIN SCH UNIT: 100 INJECTION, SOLUTION SUBCUTANEOUS at 12:27

## 2018-01-22 NOTE — PCM.FALL
Post Fall Progress Note





- Post Fall


Fall Date: 18


Fall Time: 15:36


Description of Fall: 





Mechanical Fall





- Post Fall Exam


Vital Sign: 


 











Temp Pulse Resp BP Pulse Ox


 


 98.0 F   73   20   190/96 H  97 


 


 18 09:03  18 10:18  18 09:03  18 14:00  18 09:03








Skull Exam: Negative for: Scalp wound, Scalp hematoma


Eye Exam: Positive for: Pupils equal, Pupils reactive


Ear Exam: Negative for: Discharge, Bleeding


Nose Exam: Negative for: Discharge, Bleeding


Skin Exam: Positive for: Grazes (rt wrist (watch cuffs during fall))


Mouth Exam: Negative for: Tongue bitten, Teeth dislodge


Neck Exam: Negative for: Tenderness


Spinal Exam: Negative for: Tenderness


Chest Exam: Negative for: Difficulty breathing, Tenderness in collar bones, 

Tenderness in ribs


Abdomen Exam: Negative for: Tenderness


Pelvic Exam: Negative for: Tenderness


Arm Exam: Negative for: Deformity, Alteration in range of movement


Leg Exam: Negative for: Deformity, Alteration in range of movement


Impression/Plan: 





Witnessed mechanical fall by nursing. Patient told to get back in bed by nursing

, on his way back to bed, he landed on left side on bag of clothes. He did not 

hit his head/neck. Patient with history of alcohol abuse, and was recently 

started on Ativan. He denied pain anywhere on his body after the fall, and is 

oriented x 3. 





Exam:


HEENT: scleral icterus


Extremities: Full ROM, no evidence of bruising on any extremity save one 1.5cm 

laceration on right wrist


CV: S1, S2, regular rate


Lungs: CTA b/l


B





Plan


No imaging studies warranted


Spoke with resident caring for patient, Dr. Núñez, and will change ativan 

schedule to 2mg Q4H, and give 2mg STAT dose


Will place pt on Avasys, fall precaution, and make attending, Dr. Fung aware


f/u Ammonia level

## 2018-01-22 NOTE — CP.PCM.PN
<Linette Acevedo - Last Filed: 01/22/18 14:01>





Subjective





- Date & Time of Evaluation


Date of Evaluation: 01/22/18


Time of Evaluation: 13:41





- Subjective


Subjective: 


Medicine progress note for Dr. Fung's service





Patient was seen and examined at bedside in no acute distress. Patient 

complains of epigastric and parasternal pain with hiccups, as well as 

constipation. He states he had a bowel movement this morning, but the stool was 

hard. Patient otherwise denies remaining review of systems. 





Objective





- Vital Signs/Intake and Output


Vital Signs (last 24 hours): 


 











Temp Pulse Resp BP Pulse Ox


 


 98.0 F   73   20   157/95 H  97 


 


 01/22/18 09:03  01/22/18 10:18  01/22/18 09:03  01/22/18 10:20  01/22/18 09:03








Intake and Output: 


 











 01/22/18 01/22/18





 06:59 18:59


 


Intake Total 1550 


 


Balance 1550 














- Medications


Medications: 


 Current Medications





Folic Acid (Folic Acid)  1 mg PO DAILY [facility]


   Last Admin: 01/22/18 10:18 Dose:  1 mg


Furosemide (Lasix)  40 mg PO DAILY [facility]


   Last Admin: 01/22/18 10:20 Dose:  40 mg


Heparin Sodium (Porcine) (Heparin)  5,000 units SC Q8 [facility]


   Last Admin: 01/22/18 05:42 Dose:  5,000 units


Sodium Chloride (Sodium Chloride 0.45%)  1,000 mls @ 75 mls/hr IV .[id number] [facility]


   Last Admin: 01/21/18 18:33 Dose:  75 mls/hr


Insulin Human Regular (Novolin R)  0 unit SC ACHS [facility]


   PRN Reason: Protocol


   Last Admin: 01/22/18 12:27 Dose:  2 unit


Lisinopril (Zestril)  20 mg PO DAILY [facility]


   Last Admin: 01/22/18 09:00 Dose:  Not Given


Lorazepam (Ativan)  2 mg IVP Q8H PRN; [facility]


   PRN Reason: Anxiety


   Stop: 01/26/18 18:13


Morphine Sulfate (Morphine)  1 mg IV Q4 PRN


   PRN Reason: Pain, moderate (4-7)


Multivitamins/Vitamin C (Multi-Delyn Liquid)  5 ml PO DAILY [facility]


   Last Admin: 01/22/18 10:18 Dose:  5 ml


Pantoprazole Sodium (Protonix Inj)  40 mg IVP Q12H [facility]


   Last Admin: 01/22/18 05:42 Dose:  40 mg


Potassium Chloride (K-Dur 20 Meq Er Tab)  20 meq PO DAILY [facility]


   Last Admin: 01/22/18 10:18 Dose:  20 meq


Propranolol HCl (Inderal)  20 mg PO TID [facility]


   Last Admin: 01/22/18 10:18 Dose:  20 mg


Thiamine HCl (Vitamin B1 Tab)  100 mg PO DAILY [facility]


   Last Admin: 01/22/18 10:18 Dose:  100 mg











- Labs


Labs: 


 





 01/22/18 07:23 





 01/22/18 07:23 











- Constitutional


Appears: No Acute Distress





- Head Exam


Head Exam: ATRAUMATIC, NORMOCEPHALIC





- Eye Exam


Eye Exam: EOMI





- ENT Exam


ENT Exam: Mucous Membranes Moist





- Respiratory Exam


Respiratory Exam: NORMAL BREATHING PATTERN.  absent: Rales, Rhonchi, Wheezes, 

Respiratory Distress





- Cardiovascular Exam


Cardiovascular Exam: REGULAR RHYTHM, +S1, +S2





- GI/Abdominal Exam


GI & Abdominal Exam: Soft, Normal Bowel Sounds.  absent: Distended, Firm, 

Tenderness





- Neurological Exam


Neurological Exam: Alert, Awake





- Psychiatric Exam


Psychiatric exam: Normal Affect, Normal Mood





- Skin


Skin Exam: Dry, Intact, Normal Color, Warm





Assessment and Plan





- Assessment and Plan (Free Text)


Plan: 





(1) Acute pancreatitis


* Likely secondary to alcohol 


* NPO diet


* Imaging:


 * CT of Abdomen/Pelvis: Findings consistent with mild or early acute 

pancreatitis.  New low-density/cystic 1.3 cm mass in body of pancreas. 

Nonspecific.  Possible intrapancreatic pseudocyst from prior pancreatitis.  

Possible IPMN.  Follow-up advised. Fatty infiltration of the liver with sparing 

of the left lobe and mild atrophy of the left lobe. Mildly thickened bladder 

wall but there is poor distension of the urinary bladder.  Rule out cystitis. 

Nonobstructing tiny right renal calculus. Ill-defined opacity in right lower 

lobe.  Rule out pneumonia.  Circumferential mural thickening of distal 

esophagus.  Rule out neoplasm.  Consider evaluation with endoscopy when 

clinically feasible.


* Lipase 2310 --> 2761


* Amylase: 200


* Lipid panel: , Cholesterol 137, LDL 42, HDL 67


* Medications


 * Continue Morphine 1mg IV Q4H prn pain


 * Continue Zofran 4mg IVP Q8H prn N/V


 * 0.45%NS IV fluids 





(2) Hyponatremia


* Hydration with half NS as above


* Continue to monitor labs





(3) Bilirubinemia


* T. Geovanni: 3.0


* Direct:1.0





(4) Elevated LFTs


* Likely secondary to alcohol 





(5) HTN (hypertension)


* Pt did not take medications on day of admission


* Propranolol 20mg po TID


* Lisinopril 20mg daily


* Monitor BP





(6) Diabetes


* Hold home medication of Glimepiride


* Start ISS with Accuchecks Q6H


* Monitor





(7) ETOH abuse


* Ativan 1mg IV Q6h HENRY and PRN for alcohol withdrawal


* Thiamine PO daily 


* MV PO daily


* Folic Acid PO daily


* Monitor for withdrawal. Last drink yesterday 1/20/18


* Aspiration precautions


* Seizure precautions





(9) Constipation:


* Colace 100mg PO daily





(10) Prophylactic measure


* Protonix 40mg IV daily


* Heparin SC 


* SCDs





Case discussed with Dr. Fung





<Marlo Fung Jr. - Last Filed: 01/25/18 19:22>





Objective





- Vital Signs/Intake and Output


Vital Signs (last 24 hours): 


 











Temp Pulse Resp BP Pulse Ox


 


 98.8 F   66   20   149/79   99 


 


 01/25/18 15:23  01/25/18 15:23  01/25/18 15:23  01/25/18 15:23  01/25/18 15:23











- Medications


Medications: 


 Current Medications





Chlordiazepoxide (Librium)  50 mg PO Q8 [facility]


   Last Admin: 01/25/18 14:16 Dose:  50 mg


Docusate Sodium (Colace)  100 mg PO DAILY [facility]


   Last Admin: 01/25/18 09:26 Dose:  100 mg


Folic Acid (Folic Acid)  1 mg PO DAILY [facility]


   Last Admin: 01/25/18 09:26 Dose:  1 mg


Furosemide (Lasix)  40 mg PO DAILY [facility]


   Last Admin: 01/25/18 09:26 Dose:  40 mg


Heparin Sodium (Porcine) (Heparin)  5,000 units SC Q8 [facility]


   Last Admin: 01/25/18 06:18 Dose:  5,000 units


Sodium Chloride (Sodium Chloride 0.45%)  1,000 mls @ 75 mls/hr IV .[id number] [facility]


   Last Admin: 01/25/18 06:29 Dose:  75 mls/hr


Insulin Human Regular (Novolin R)  0 unit SC ACHS [facility]


   PRN Reason: Protocol


   Last Admin: 01/25/18 17:40 Dose:  3 unit


Lisinopril (Zestril)  20 mg PO DAILY [facility]


   Last Admin: 01/25/18 09:26 Dose:  20 mg


Morphine Sulfate (Morphine)  1 mg IV Q4 PRN


   PRN Reason: Pain, moderate (4-7)


Multivitamins/Vitamin C (Multi-Delyn Liquid)  5 ml PO DAILY [facility]


   Last Admin: 01/25/18 09:26 Dose:  5 ml


Pantoprazole Sodium (Protonix Inj)  40 mg IVP Q12H [facility]


   Last Admin: 01/25/18 17:38 Dose:  40 mg


Potassium Chloride (K-Dur 20 Meq Er Tab)  20 meq PO DAILY [facility]


   Last Admin: 01/25/18 09:26 Dose:  20 meq


Propranolol HCl (Inderal)  20 mg PO TID [facility]


   Last Admin: 01/25/18 17:38 Dose:  20 mg


Thiamine HCl (Vitamin B1 Tab)  100 mg PO DAILY [facility]


   Last Admin: 01/25/18 09:26 Dose:  100 mg











- Labs


Labs: 


 





 01/25/18 06:30 





 01/25/18 06:30 





 











APTT  30 SECONDS (21-34)   01/25/18  11:43    














Attending/Attestation





- Attestation


I have personally seen and examined this patient.: Yes


I have fully participated in the care of the patient.: Yes


I have reviewed all pertinent clinical information, including history, physical 

exam and plan: Yes


Notes (Text): 





01/25/18 19:22


Agree with resident note and plan of care

## 2018-01-23 LAB
ALBUMIN SERPL-MCNC: 2.9 G/DL (ref 3.5–5)
ALBUMIN/GLOB SERPL: 1.1 {RATIO} (ref 1–2.1)
ALT SERPL-CCNC: 29 U/L (ref 21–72)
AST SERPL-CCNC: 32 U/L (ref 17–59)
BASOPHILS # BLD AUTO: 0 K/UL (ref 0–0.2)
BASOPHILS NFR BLD: 0.9 % (ref 0–2)
BUN SERPL-MCNC: 22 MG/DL (ref 9–20)
CALCIUM SERPL-MCNC: 9.7 MG/DL (ref 8.6–10.4)
EOSINOPHIL # BLD AUTO: 0.1 K/UL (ref 0–0.7)
EOSINOPHIL NFR BLD: 1.7 % (ref 0–4)
ERYTHROCYTE [DISTWIDTH] IN BLOOD BY AUTOMATED COUNT: 26.2 % (ref 11.5–14.5)
GFR NON-AFRICAN AMERICAN: 38
HGB BLD-MCNC: 7.1 G/DL (ref 12–18)
LYMPHOCYTES # BLD AUTO: 0.6 K/UL (ref 1–4.3)
LYMPHOCYTES NFR BLD AUTO: 14.8 % (ref 20–40)
MAGNESIUM SERPL-MCNC: 1.6 MG/DL (ref 1.6–2.3)
MCH RBC QN AUTO: 27.8 PG (ref 27–31)
MCHC RBC AUTO-ENTMCNC: 31.5 G/DL (ref 33–37)
MCV RBC AUTO: 88.5 FL (ref 80–94)
MONOCYTES # BLD: 0.2 K/UL (ref 0–0.8)
MONOCYTES NFR BLD: 4.9 % (ref 0–10)
NEUTROPHILS # BLD: 3.3 K/UL (ref 1.8–7)
NEUTROPHILS NFR BLD AUTO: 77.7 % (ref 50–75)
NRBC BLD AUTO-RTO: 0.1 % (ref 0–2)
PLATELET # BLD: 103 K/UL (ref 130–400)
PMV BLD AUTO: 7.7 FL (ref 7.2–11.7)
RBC # BLD AUTO: 2.55 MIL/UL (ref 4.4–5.9)
WBC # BLD AUTO: 4.3 K/UL (ref 4.8–10.8)

## 2018-01-23 RX ADMIN — HUMAN INSULIN SCH: 100 INJECTION, SOLUTION SUBCUTANEOUS at 21:44

## 2018-01-23 RX ADMIN — HUMAN INSULIN SCH UNIT: 100 INJECTION, SOLUTION SUBCUTANEOUS at 08:00

## 2018-01-23 RX ADMIN — POTASSIUM CHLORIDE SCH MEQ: 20 TABLET, EXTENDED RELEASE ORAL at 09:36

## 2018-01-23 RX ADMIN — HUMAN INSULIN SCH UNIT: 100 INJECTION, SOLUTION SUBCUTANEOUS at 12:10

## 2018-01-23 RX ADMIN — HUMAN INSULIN SCH: 100 INJECTION, SOLUTION SUBCUTANEOUS at 17:32

## 2018-01-23 RX ADMIN — Medication SCH ML: at 09:37

## 2018-01-23 NOTE — CP.PCM.PN
<Linette Acevedo - Last Filed: 01/23/18 14:15>





Subjective





- Date & Time of Evaluation


Date of Evaluation: 01/23/18


Time of Evaluation: 07:46





- Subjective


Subjective: 





Medicine progress note for Dr. Fung's service





Patient was seen and examined at bedside in the morning. Patient was lethargic 

and sleeping. Patient was revisited shortly after and was alert and awake. 

Patient is not oriented. Patient states he is a hungry. Review of systems was 

not obtained due to patient's condition.





Objective





- Vital Signs/Intake and Output


Vital Signs (last 24 hours): 


 











Temp Pulse Resp BP Pulse Ox


 


 98.7 F   76   20   173/89 H  94 L


 


 01/23/18 00:27  01/23/18 00:27  01/23/18 00:27  01/23/18 00:27  01/23/18 00:27








Intake and Output: 


 











 01/23/18 01/23/18





 06:59 18:59


 


Intake Total 450 


 


Balance 450 














- Medications


Medications: 


 Current Medications





Docusate Sodium (Colace)  100 mg PO DAILY [facility]


Folic Acid (Folic Acid)  1 mg PO DAILY [facility]


   Last Admin: 01/22/18 10:18 Dose:  1 mg


Furosemide (Lasix)  40 mg PO DAILY [facility]


   Last Admin: 01/22/18 10:20 Dose:  40 mg


Heparin Sodium (Porcine) (Heparin)  5,000 units SC Q8 [facility]


   Last Admin: 01/22/18 22:39 Dose:  5,000 units


Sodium Chloride (Sodium Chloride 0.45%)  1,000 mls @ 75 mls/hr IV .[id number] [facility]


   Last Admin: 01/23/18 07:37 Dose:  75 mls/hr


Insulin Human Regular (Novolin R)  0 unit SC ACHS [facility]


   PRN Reason: Protocol


   Last Admin: 01/22/18 22:35 Dose:  Not Given


Lisinopril (Zestril)  20 mg PO DAILY [facility]


   Last Admin: 01/22/18 09:00 Dose:  Not Given


Lorazepam (Ativan)  1 mg IVP Q6H PRN


   PRN Reason: Symptoms of alcohol withdrawl


   Last Admin: 01/22/18 15:46 Dose:  1 mg


Lorazepam (Ativan)  2 mg IVP Q6 [facility]


   Last Admin: 01/23/18 05:44 Dose:  2 mg


Morphine Sulfate (Morphine)  1 mg IV Q4 PRN


   PRN Reason: Pain, moderate (4-7)


Multivitamins/Vitamin C (Multi-Delyn Liquid)  5 ml PO DAILY [facility]


   Last Admin: 01/22/18 10:18 Dose:  5 ml


Pantoprazole Sodium (Protonix Inj)  40 mg IVP Q12H [facility]


   Last Admin: 01/22/18 18:30 Dose:  40 mg


Potassium Chloride (K-Dur 20 Meq Er Tab)  20 meq PO DAILY [facility]


   Last Admin: 01/22/18 10:18 Dose:  20 meq


Propranolol HCl (Inderal)  20 mg PO TID [facility]


   Last Admin: 01/22/18 18:05 Dose:  20 mg


Thiamine HCl (Vitamin B1 Tab)  100 mg PO DAILY [facility]


   Last Admin: 01/22/18 10:18 Dose:  100 mg











- Labs


Labs: 


 





 01/23/18 06:36 





 01/23/18 06:36 











- Additional Findings


Additional findings: 





- Constitutional


Appears: No Acute Distress





- Head Exam


Head Exam: ATRAUMATIC, NORMOCEPHALIC





- Eye Exam


Eye Exam: EOMI





- ENT Exam


ENT Exam: Mucous Membranes Moist





- Respiratory Exam


Respiratory Exam: NORMAL BREATHING PATTERN.  absent: Rales, Rhonchi, Wheezes, 

Respiratory Distress





- Cardiovascular Exam


Cardiovascular Exam: REGULAR RHYTHM, +S1, +S2





- GI/Abdominal Exam


GI & Abdominal Exam: Soft, Normal Bowel Sounds.  absent: Distended, Firm, 

Tenderness





- Neurological Exam


Neurological Exam: Alert, Awake





- Psychiatric Exam


Psychiatric exam: Sedated





- Skin


Skin Exam: Dry, Intact, Normal Color, Warm








Assessment and Plan





- Assessment and Plan (Free Text)


Plan: 





(1) Acute pancreatitis


* Likely secondary to alcohol 


* Continue NPO diet


* Imaging:


 * CT of Abdomen/Pelvis: Findings consistent with mild or early acute 

pancreatitis.  New low-density/cystic 1.3 cm mass in body of pancreas. 

Nonspecific.  Possible intrapancreatic pseudocyst from prior pancreatitis.  

Possible IPMN.  Follow-up advised. Fatty infiltration of the liver with sparing 

of the left lobe and mild atrophy of the left lobe. Mildly thickened bladder 

wall but there is poor distension of the urinary bladder.  Rule out cystitis. 

Nonobstructing tiny right renal calculus. Ill-defined opacity in right lower 

lobe.  Rule out pneumonia.  Circumferential mural thickening of distal 

esophagus.  Rule out neoplasm.  Consider evaluation with endoscopy when 

clinically feasible.


* Lipase 2310 --> 2761


* Amylase: 200


* Lipid panel: , Cholesterol 137, LDL 42, HDL 67


* Medications


 * Continue Morphine 1mg IV Q4H prn pain


 * Continue Zofran 4mg IVP Q8H prn N/V


 * 0.45%NS IV fluids 





(2) Hyponatremia


* Hydration with half NS as above


* Continue to monitor labs





(3) Bilirubinemia


* T. Geovanni: 3.0


* Direct:1.0





(4) Elevated LFTs


* Likely secondary to alcohol 


* Resolved





(5) HTN (hypertension)


* Pt did not take medications on day of admission


* Propranolol 20mg po TID


* Lisinopril 20mg daily


* Monitor BP





(6) Diabetes


* Hold home medication of Glimepiride


* Start ISS with Accuchecks Q6H


* Monitor





(7) ETOH abuse


* Ativan 1mg IV Q6h [facility]EN[facility] and PRN for alcohol withdrawal- HOLD if sleeping or 

sedated.


* Thiamine PO daily 


* MV PO daily


* Folic Acid PO daily


* Monitor for withdrawal. Last drink yesterday 1/20/18


* Aspiration precautions


* Seizure precautions





(9) Constipation:


* Colace 100mg PO daily





(10) Prophylactic measure


* Protonix 40mg IV daily


* Heparin SC 


* SCDs





<Marlo Fung Jr. - Last Filed: 01/25/18 19:26>





Objective





- Vital Signs/Intake and Output


Vital Signs (last 24 hours): 


 











Temp Pulse Resp BP Pulse Ox


 


 98.8 F   66   20   149/79   99 


 


 01/25/18 15:23  01/25/18 15:23  01/25/18 15:23  01/25/18 15:23  01/25/18 15:23











- Medications


Medications: 


 Current Medications





Chlordiazepoxide (Librium)  50 mg PO Q8 [facility]


   Last Admin: 01/25/18 14:16 Dose:  50 mg


Docusate Sodium (Colace)  100 mg PO DAILY [facility]


   Last Admin: 01/25/18 09:26 Dose:  100 mg


Folic Acid (Folic Acid)  1 mg PO DAILY [facility]


   Last Admin: 01/25/18 09:26 Dose:  1 mg


Furosemide (Lasix)  40 mg PO DAILY [facility]


   Last Admin: 01/25/18 09:26 Dose:  40 mg


Heparin Sodium (Porcine) (Heparin)  5,000 units SC Q8 [facility]


   Last Admin: 01/25/18 06:18 Dose:  5,000 units


Sodium Chloride (Sodium Chloride 0.45%)  1,000 mls @ 75 mls/hr IV .[id number] [facility]


   Last Admin: 01/25/18 06:29 Dose:  75 mls/hr


Insulin Human Regular (Novolin R)  0 unit SC ACHS [facility]


   PRN Reason: Protocol


   Last Admin: 01/25/18 17:40 Dose:  3 unit


Lisinopril (Zestril)  20 mg PO DAILY [facility]


   Last Admin: 01/25/18 09:26 Dose:  20 mg


Morphine Sulfate (Morphine)  1 mg IV Q4 PRN


   PRN Reason: Pain, moderate (4-7)


Multivitamins/Vitamin C (Multi-Delyn Liquid)  5 ml PO DAILY [facility]


   Last Admin: 01/25/18 09:26 Dose:  5 ml


Pantoprazole Sodium (Protonix Inj)  40 mg IVP Q12H [facility]


   Last Admin: 01/25/18 17:38 Dose:  40 mg


Potassium Chloride (K-Dur 20 Meq Er Tab)  20 meq PO DAILY [facility]


   Last Admin: 01/25/18 09:26 Dose:  20 meq


Propranolol HCl (Inderal)  20 mg PO TID [facility]


   Last Admin: 01/25/18 17:38 Dose:  20 mg


Thiamine HCl (Vitamin B1 Tab)  100 mg PO DAILY [facility]


   Last Admin: 01/25/18 09:26 Dose:  100 mg











- Labs


Labs: 


 





 01/25/18 06:30 





 01/25/18 06:30 





 











APTT  30 SECONDS (21-34)   01/25/18  11:43    














Attending/Attestation





- Attestation


I have personally seen and examined this patient.: Yes


I have fully participated in the care of the patient.: Yes


I have reviewed all pertinent clinical information, including history, physical 

exam and plan: Yes


Notes (Text): 





01/25/18 19:26


Agree with resident note and plan of care

## 2018-01-24 LAB
ALBUMIN SERPL-MCNC: 3.3 G/DL (ref 3.5–5)
ALBUMIN/GLOB SERPL: 1 {RATIO} (ref 1–2.1)
ALT SERPL-CCNC: 33 U/L (ref 21–72)
AST SERPL-CCNC: 60 U/L (ref 17–59)
BASOPHILS # BLD AUTO: 0 K/UL (ref 0–0.2)
BASOPHILS NFR BLD: 0.7 % (ref 0–2)
BUN SERPL-MCNC: 20 MG/DL (ref 9–20)
CALCIUM SERPL-MCNC: 9.8 MG/DL (ref 8.6–10.4)
EOSINOPHIL # BLD AUTO: 0.1 K/UL (ref 0–0.7)
EOSINOPHIL NFR BLD: 1.9 % (ref 0–4)
ERYTHROCYTE [DISTWIDTH] IN BLOOD BY AUTOMATED COUNT: 22.1 % (ref 11.5–14.5)
GFR NON-AFRICAN AMERICAN: 43
HGB BLD-MCNC: 10 G/DL (ref 12–18)
LYMPHOCYTES # BLD AUTO: 0.6 K/UL (ref 1–4.3)
LYMPHOCYTES NFR BLD AUTO: 14 % (ref 20–40)
MAGNESIUM SERPL-MCNC: 1.4 MG/DL (ref 1.6–2.3)
MCH RBC QN AUTO: 28.9 PG (ref 27–31)
MCHC RBC AUTO-ENTMCNC: 32.6 G/DL (ref 33–37)
MCV RBC AUTO: 88.7 FL (ref 80–94)
MONOCYTES # BLD: 0.4 K/UL (ref 0–0.8)
MONOCYTES NFR BLD: 8.2 % (ref 0–10)
NEUTROPHILS # BLD: 3.3 K/UL (ref 1.8–7)
NEUTROPHILS NFR BLD AUTO: 75.2 % (ref 50–75)
NRBC BLD AUTO-RTO: 0.1 % (ref 0–2)
PLATELET # BLD: 101 K/UL (ref 130–400)
PMV BLD AUTO: 8.4 FL (ref 7.2–11.7)
RBC # BLD AUTO: 3.46 MIL/UL (ref 4.4–5.9)
WBC # BLD AUTO: 4.4 K/UL (ref 4.8–10.8)

## 2018-01-24 RX ADMIN — POTASSIUM CHLORIDE SCH MEQ: 20 TABLET, EXTENDED RELEASE ORAL at 10:45

## 2018-01-24 RX ADMIN — Medication SCH ML: at 10:46

## 2018-01-24 RX ADMIN — HUMAN INSULIN SCH: 100 INJECTION, SOLUTION SUBCUTANEOUS at 06:46

## 2018-01-24 RX ADMIN — HUMAN INSULIN SCH UNIT: 100 INJECTION, SOLUTION SUBCUTANEOUS at 17:38

## 2018-01-24 RX ADMIN — HUMAN INSULIN SCH UNIT: 100 INJECTION, SOLUTION SUBCUTANEOUS at 22:47
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
## 2018-01-25 LAB
% IRON SATURATION: 20 (ref 20–55)
ALBUMIN SERPL-MCNC: 3 G/DL (ref 3.5–5)
ALBUMIN/GLOB SERPL: 0.9 {RATIO} (ref 1–2.1)
ALT SERPL-CCNC: 27 U/L (ref 21–72)
AST SERPL-CCNC: 48 U/L (ref 17–59)
BASOPHILS # BLD AUTO: 0 K/UL (ref 0–0.2)
BASOPHILS NFR BLD: 1.1 % (ref 0–2)
BUN SERPL-MCNC: 18 MG/DL (ref 9–20)
CALCIUM SERPL-MCNC: 9.2 MG/DL (ref 8.6–10.4)
EOSINOPHIL # BLD AUTO: 0.1 K/UL (ref 0–0.7)
EOSINOPHIL NFR BLD: 3.2 % (ref 0–4)
ERYTHROCYTE [DISTWIDTH] IN BLOOD BY AUTOMATED COUNT: 22.5 % (ref 11.5–14.5)
GFR NON-AFRICAN AMERICAN: 47
HGB BLD-MCNC: 9.6 G/DL (ref 12–18)
IRON SERPL-MCNC: 38 UG/DL (ref 49–181)
LYMPHOCYTES # BLD AUTO: 0.7 K/UL (ref 1–4.3)
LYMPHOCYTES NFR BLD AUTO: 17.2 % (ref 20–40)
MAGNESIUM SERPL-MCNC: 1.4 MG/DL (ref 1.6–2.3)
MCH RBC QN AUTO: 29.3 PG (ref 27–31)
MCHC RBC AUTO-ENTMCNC: 33.3 G/DL (ref 33–37)
MCV RBC AUTO: 88 FL (ref 80–94)
MONOCYTES # BLD: 0.4 K/UL (ref 0–0.8)
MONOCYTES NFR BLD: 8.8 % (ref 0–10)
NEUTROPHILS # BLD: 3 K/UL (ref 1.8–7)
NEUTROPHILS NFR BLD AUTO: 69.7 % (ref 50–75)
NRBC BLD AUTO-RTO: 0.2 % (ref 0–2)
PLATELET # BLD: 98 K/UL (ref 130–400)
PMV BLD AUTO: 8.4 FL (ref 7.2–11.7)
RBC # BLD AUTO: 3.27 MIL/UL (ref 4.4–5.9)
TIBC SERPL-MCNC: 197 UG/DL (ref 250–450)
WBC # BLD AUTO: 4.3 K/UL (ref 4.8–10.8)

## 2018-01-25 RX ADMIN — HUMAN INSULIN SCH: 100 INJECTION, SOLUTION SUBCUTANEOUS at 22:02

## 2018-01-25 RX ADMIN — HUMAN INSULIN SCH UNIT: 100 INJECTION, SOLUTION SUBCUTANEOUS at 08:00

## 2018-01-25 RX ADMIN — Medication SCH ML: at 09:26

## 2018-01-25 RX ADMIN — HUMAN INSULIN SCH UNIT: 100 INJECTION, SOLUTION SUBCUTANEOUS at 11:40

## 2018-01-25 RX ADMIN — HUMAN INSULIN SCH UNIT: 100 INJECTION, SOLUTION SUBCUTANEOUS at 17:40

## 2018-01-25 RX ADMIN — POTASSIUM CHLORIDE SCH MEQ: 20 TABLET, EXTENDED RELEASE ORAL at 09:26

## 2018-01-25 NOTE — CP.PCM.PN
<Linette Acevedo - Last Filed: 01/25/18 18:19>





Subjective





- Date & Time of Evaluation


Date of Evaluation: 01/25/18


Time of Evaluation: 06:54





- Subjective


Subjective: 





Medicine progress note for Dr. Fung's service





Patient was seen and examined at bedside in the morning. Patient was sleeping 

comfortably in bed. Patient reports feeling better and tolerating his liquid 

diet. Review of systems limited due to patient's cooperation.





Objective





- Vital Signs/Intake and Output


Vital Signs (last 24 hours): 


 











Temp Pulse Resp BP Pulse Ox


 


 98.3 F   68   20   202/87 H  97 


 


 01/24/18 23:31  01/25/18 04:21  01/24/18 23:31  01/25/18 04:00  01/24/18 23:31








Intake and Output: 


 











 01/24/18 01/25/18





 18:59 06:59


 


Intake Total 325 


 


Balance 325 














- Medications


Medications: 


 Current Medications





Chlordiazepoxide (Librium)  50 mg PO Q8 [facility]


   Last Admin: 01/25/18 06:16 Dose:  50 mg


Docusate Sodium (Colace)  100 mg PO DAILY [facility]


   Last Admin: 01/24/18 10:45 Dose:  100 mg


Folic Acid (Folic Acid)  1 mg PO DAILY [facility]


   Last Admin: 01/24/18 10:45 Dose:  1 mg


Furosemide (Lasix)  40 mg PO DAILY [facility]


   Last Admin: 01/24/18 10:45 Dose:  40 mg


Heparin Sodium (Porcine) (Heparin)  5,000 units SC Q8 [facility]


   Last Admin: 01/25/18 06:18 Dose:  5,000 units


Sodium Chloride (Sodium Chloride 0.45%)  1,000 mls @ 75 mls/hr IV .[id number] [facility]


   Last Admin: 01/25/18 06:29 Dose:  75 mls/hr


Insulin Human Regular (Novolin R)  0 unit SC ACHS [facility]


   PRN Reason: Protocol


   Last Admin: 01/24/18 22:47 Dose:  2 unit


Lisinopril (Zestril)  20 mg PO DAILY [facility]


   Last Admin: 01/24/18 10:45 Dose:  20 mg


Morphine Sulfate (Morphine)  1 mg IV Q4 [facility]


   PRN Reason: Pain, moderate (4-7)


Multivitamins/Vitamin C (Multi-Delyn Liquid)  5 ml PO DAILY [facility]


   Last Admin: 01/24/18 10:46 Dose:  5 ml


Pantoprazole Sodium (Protonix Inj)  40 mg IVP Q12H [facility]


   Last Admin: 01/25/18 06:18 Dose:  40 mg


Potassium Chloride (K-Dur 20 Meq Er Tab)  20 meq PO DAILY [facility]


   Last Admin: 01/24/18 10:45 Dose:  20 meq


Propranolol HCl (Inderal)  20 mg PO TID [facility]


   Last Admin: 01/24/18 17:37 Dose:  20 mg


Thiamine HCl (Vitamin B1 Tab)  100 mg PO DAILY [facility]


   Last Admin: 01/24/18 10:45 Dose:  100 mg











- Labs


Labs: 


 





 01/24/18 13:48 





 01/24/18 13:48 











- Additional Findings


Additional findings: 





- Constitutional


Appears: No Acute Distress





- Head Exam


Head Exam: ATRAUMATIC, NORMOCEPHALIC





- Eye Exam


Eye Exam: EOMI





- ENT Exam


ENT Exam: Mucous Membranes Moist





- Respiratory Exam


Respiratory Exam: NORMAL BREATHING PATTERN.  absent: Rales, Rhonchi, Wheezes, 

Respiratory Distress





- Cardiovascular Exam


Cardiovascular Exam: REGULAR RHYTHM, +S1, +S2





- GI/Abdominal Exam


GI & Abdominal Exam: Soft, Normal Bowel Sounds.  absent: Distended, Firm, 

Tenderness





- Neurological Exam


Neurological Exam: Alert, Awake





- Psychiatric Exam


Psychiatric exam: Sedated





- Skin


Skin Exam: Dry, Intact, Normal Color, Warm





Assessment and Plan





- Assessment and Plan (Free Text)


Plan: 





Plan: 





(1) Acute pancreatitis


* Likely secondary to alcohol 


* Advancing diet--> CLD diet


* Imaging:


 * CT of Abdomen/Pelvis: Findings consistent with mild or early acute 

pancreatitis.  New low-density/cystic 1.3 cm mass in body of pancreas. 

Nonspecific.  Possible intrapancreatic pseudocyst from prior pancreatitis.  

Possible IPMN.  Follow-up advised. Fatty infiltration of the liver with sparing 

of the left lobe and mild atrophy of the left lobe. Mildly thickened bladder 

wall but there is poor distension of the urinary bladder.  Rule out cystitis. 

Nonobstructing tiny right renal calculus. Ill-defined opacity in right lower 

lobe.  Rule out pneumonia.  Circumferential mural thickening of distal 

esophagus.  Rule out neoplasm.  Consider evaluation with endoscopy when 

clinically feasible.


* Lipase 2310 --> 2761


* Amylase: 200


* Lipid panel: , Cholesterol 137, LDL 42, HDL 67


* Medications


 * Continue Morphine 1mg IV Q4H prn pain


 * Continue Zofran 4mg IVP Q8H prn N/V


 * 0.45%NS IV fluids 





(2) Hyponatremia


* Hydration with half NS as above


* Continue to monitor labs





(3) Bilirubinemia


* T. Geovanni: 3.0


* Direct:1.0





(4) Elevated LFTs


* Likely secondary to alcohol 


* Resolved





(5) HTN (hypertension)


* Pt did not take medications on day of admission


* Propranolol 20mg po TID


* Lisinopril 20mg daily


* Monitor BP





(6) Diabetes


* Hold home medication of Glimepiride


* Start ISS with Accuchecks Q6H


* Monitor





(7) ETOH abuse


* Starting Librium 50mg Q8 [facility]- HOLD if sleeping or sedated 


 * Discontinued on 1/24/18 Ativan 1mg IV Q6h [facility] and PRN for alcohol withdrawal

- HOLD if sleeping or sedated.


* Thiamine PO daily 


* MV PO daily


* Folic Acid PO daily


* Monitor for withdrawal. Last drink yesterday 1/20/18


* Aspiration precautions


* Seizure precautions





(9) Constipation:


* Colace 100mg PO daily





(10) Anemia


* Hgb 7.1 on 1/23 (decreased from 9.9 at admission)


* Type and cross


* 2 units PRBC given on 1/23--> Hgb improved


* Stool occult: f/u results


* Iron studies: f/u results


* GI consulted, Dr. Quintanilla; recs appreciated


* Continue to monitor H/H





(11) Prophylactic measure


* Protonix 40mg IV daily


* Heparin SC - HOLD


* SCDs





Discussed with Dr. Fung.





<Marlo Fung Jr. - Last Filed: 01/25/18 19:36>





Objective





- Vital Signs/Intake and Output


Vital Signs (last 24 hours): 


 











Temp Pulse Resp BP Pulse Ox


 


 98.8 F   66   20   149/79   99 


 


 01/25/18 15:23  01/25/18 15:23  01/25/18 15:23  01/25/18 15:23  01/25/18 15:23











- Medications


Medications: 


 Current Medications





Chlordiazepoxide (Librium)  50 mg PO Q8 [facility]


   Last Admin: 01/25/18 14:16 Dose:  50 mg


Docusate Sodium (Colace)  100 mg PO DAILY [facility]


   Last Admin: 01/25/18 09:26 Dose:  100 mg


Folic Acid (Folic Acid)  1 mg PO DAILY [facility]


   Last Admin: 01/25/18 09:26 Dose:  1 mg


Furosemide (Lasix)  40 mg PO DAILY [facility]


   Last Admin: 01/25/18 09:26 Dose:  40 mg


Heparin Sodium (Porcine) (Heparin)  5,000 units SC Q8 [facility]


   Last Admin: 01/25/18 06:18 Dose:  5,000 units


Sodium Chloride (Sodium Chloride 0.45%)  1,000 mls @ 75 mls/hr IV .[id number] [facility]


   Last Admin: 01/25/18 06:29 Dose:  75 mls/hr


Insulin Human Regular (Novolin R)  0 unit SC ACHS [facility]


   PRN Reason: Protocol


   Last Admin: 01/25/18 17:40 Dose:  3 unit


Lisinopril (Zestril)  20 mg PO DAILY [facility]


   Last Admin: 01/25/18 09:26 Dose:  20 mg


Morphine Sulfate (Morphine)  1 mg IV Q4 PRN


   PRN Reason: Pain, moderate (4-7)


Multivitamins/Vitamin C (Multi-Delyn Liquid)  5 ml PO DAILY [facility]


   Last Admin: 01/25/18 09:26 Dose:  5 ml


Pantoprazole Sodium (Protonix Inj)  40 mg IVP Q12H [facility]


   Last Admin: 01/25/18 17:38 Dose:  40 mg


Potassium Chloride (K-Dur 20 Meq Er Tab)  20 meq PO DAILY [facility]


   Last Admin: 01/25/18 09:26 Dose:  20 meq


Propranolol HCl (Inderal)  20 mg PO TID [facility]


   Last Admin: 01/25/18 17:38 Dose:  20 mg


Thiamine HCl (Vitamin B1 Tab)  100 mg PO DAILY [facility]


   Last Admin: 01/25/18 09:26 Dose:  100 mg











- Labs


Labs: 


 





 01/25/18 06:30 





 01/25/18 06:30 





 











APTT  30 SECONDS (21-34)   01/25/18  11:43    














Attending/Attestation





- Attestation


I have personally seen and examined this patient.: Yes


I have fully participated in the care of the patient.: Yes


I have reviewed all pertinent clinical information, including history, physical 

exam and plan: Yes


Notes (Text): 





01/25/18 19:36


Agree with resident note and plan of care

## 2018-01-26 LAB
% IRON SATURATION: 23 (ref 20–55)
% IRON SATURATION: 23.26 (ref 20–55)
ALBUMIN SERPL-MCNC: 2.9 G/DL (ref 3.5–5)
ALBUMIN/GLOB SERPL: 1 {RATIO} (ref 1–2.1)
ALPHA FETO PROTEIN: 2.9 NG/ML (ref 0–7.5)
ALT SERPL-CCNC: 35 U/L (ref 21–72)
APTT BLD: 28 SECONDS (ref 21–34)
AST SERPL-CCNC: 70 U/L (ref 17–59)
BASOPHILS # BLD AUTO: 0 K/UL (ref 0–0.2)
BASOPHILS NFR BLD: 0.9 % (ref 0–2)
BUN SERPL-MCNC: 13 MG/DL (ref 9–20)
CALCIUM SERPL-MCNC: 8.8 MG/DL (ref 8.6–10.4)
EOSINOPHIL # BLD AUTO: 0.1 K/UL (ref 0–0.7)
EOSINOPHIL NFR BLD: 3.3 % (ref 0–4)
ERYTHROCYTE [DISTWIDTH] IN BLOOD BY AUTOMATED COUNT: 22.5 % (ref 11.5–14.5)
FERRITIN SERPL-MCNC: 561 NG/ML
GFR NON-AFRICAN AMERICAN: 51
HGB BLD-MCNC: 9.3 G/DL (ref 12–18)
INR PPP: 1.4
IRON SERPL-MCNC: 47 UG/DL (ref 49–181)
LYMPHOCYTES # BLD AUTO: 0.6 K/UL (ref 1–4.3)
LYMPHOCYTES NFR BLD AUTO: 18.3 % (ref 20–40)
MCH RBC QN AUTO: 29.5 PG (ref 27–31)
MCHC RBC AUTO-ENTMCNC: 33.7 G/DL (ref 33–37)
MCV RBC AUTO: 87.5 FL (ref 80–94)
MONOCYTES # BLD: 0.4 K/UL (ref 0–0.8)
MONOCYTES NFR BLD: 11.3 % (ref 0–10)
NEUTROPHILS # BLD: 2.3 K/UL (ref 1.8–7)
NEUTROPHILS NFR BLD AUTO: 66.2 % (ref 50–75)
NRBC BLD AUTO-RTO: 0.1 % (ref 0–2)
PLATELET # BLD: 95 K/UL (ref 130–400)
PMV BLD AUTO: 8.5 FL (ref 7.2–11.7)
PROTHROMBIN TIME: 15.3 SECONDS (ref 9.7–12.2)
RBC # BLD AUTO: 3.15 MIL/UL (ref 4.4–5.9)
TIBC SERPL-MCNC: 202 UG/DL (ref 250–450)
WBC # BLD AUTO: 3.5 K/UL (ref 4.8–10.8)

## 2018-01-26 PROCEDURE — 0DB68ZX EXCISION OF STOMACH, VIA NATURAL OR ARTIFICIAL OPENING ENDOSCOPIC, DIAGNOSTIC: ICD-10-PCS | Performed by: SPECIALIST

## 2018-01-26 RX ADMIN — HUMAN INSULIN SCH UNIT: 100 INJECTION, SOLUTION SUBCUTANEOUS at 11:38

## 2018-01-26 RX ADMIN — Medication SCH ML: at 10:58

## 2018-01-26 RX ADMIN — MAGNESIUM CITRATE SCH ML: 1.75 LIQUID ORAL at 13:52

## 2018-01-26 RX ADMIN — SUCRALFATE SCH GM: 1 SUSPENSION ORAL at 21:44

## 2018-01-26 RX ADMIN — SUCRALFATE SCH GM: 1 SUSPENSION ORAL at 11:00

## 2018-01-26 RX ADMIN — HUMAN INSULIN SCH UNIT: 100 INJECTION, SOLUTION SUBCUTANEOUS at 17:37

## 2018-01-26 RX ADMIN — HUMAN INSULIN SCH: 100 INJECTION, SOLUTION SUBCUTANEOUS at 08:47

## 2018-01-26 RX ADMIN — POTASSIUM CHLORIDE SCH MEQ: 20 TABLET, EXTENDED RELEASE ORAL at 10:58

## 2018-01-26 RX ADMIN — MAGNESIUM CITRATE SCH ML: 1.75 LIQUID ORAL at 17:44

## 2018-01-26 RX ADMIN — SUCRALFATE SCH GM: 1 SUSPENSION ORAL at 17:34

## 2018-01-26 NOTE — US
HISTORY:

ABNORMAL C.T. SCAN   R/O PANC. MASS



COMPARISON:

CT abdomen and pelvis with IV contrast performed 1/21/28 



TECHNIQUE:

Sonographic evaluation of the abdomen.



FINDINGS:

Examination markedly limited by habitus and bowel gas. 



LIVER:

Measures 18.9 cm in sagittal dimension. Echogenic liver may be seen 

in setting of hepatic parenchymal disease or fatty infiltration. No 

focal hepatic mass identified. The main portal vein appears patent 

with normal directional flow.   No intrahepatic bile duct dilatation.



GALLBLADDER:

Partially distended gallbladder. No gallstones. No gallbladder wall 

thickening. Negative sonographic Bill's sign as assessed by the 

sonographer.



COMMON BILE DUCT:

Measures 7 mm. 



PANCREAS:

Not well visualized.



RIGHT KIDNEY:

Measures 11.4 x 6.2 x 5.3cm.  No obstructing calculus or 

hydronephrosis identified.



LEFT KIDNEY:

Measures 0.8 x 5.9 x 5.7cm. No obstructing calculus or hydronephrosis 

identified.



SPLEEN:

Measures approximately 12.2 cm. 



AORTA:

Not well seen. 



IVC:

Not well seen. 



OTHER FINDINGS:

Incidental note is made of small right pleural effusion. 



IMPRESSION:

Limited study. 



Please note that the pancreas cannot be adequately assessed by 

ultrasound and is not well visualized on this study.  If concern for 

pancreatic mass, recommend dedicated pancreatic protocol CT or MRI 

abdomen pancreatic protocol/MRCP. 



Incidental note is made of small right pleural effusion. 



Partially distended gallbladder. 



Echogenic liver may be seen in setting of hepatic parenchymal disease 

or fatty infiltration.

## 2018-01-26 NOTE — RAD
HISTORY:

Preop clearance.



COMPARISON:

04/03/2017 



FINDINGS:



LUNGS:

Multifocal infiltrates primarily basilar distribution left greater 

than right.



PLEURA:

No significant pleural effusion identified, no pneumothorax apparent.



CARDIOVASCULAR:

Cardiomegaly.



OSSEOUS STRUCTURES:

No significant abnormalities.



VISUALIZED UPPER ABDOMEN:

Normal.



OTHER FINDINGS:

None.



IMPRESSION:

Lower lobe infiltrates not seen previously.



Cardiomegaly without CHF.

## 2018-01-26 NOTE — CP.PCM.PN
Subjective





- Date & Time of Evaluation


Date of Evaluation: 01/26/18


Time of Evaluation: 06:53





- Subjective


Subjective: 


Medicine progress note for Dr. Fung's service





Patient was seen and examined at bedside in the morning. Patient was sitting up 

in bed, alert, and awake. Patient reports feeling better, but feels hungry and 

wants to eat. Patient denies chest pain, shortness of breath, nausea, vomiting, 

abdominal pain, leg pain. 





Objective





- Vital Signs/Intake and Output


Vital Signs (last 24 hours): 


 











Temp Pulse Resp BP Pulse Ox


 


 99.1 F   74   20   171/80 H  97 


 


 01/26/18 04:16  01/26/18 04:58  01/26/18 04:16  01/26/18 04:16  01/26/18 04:16











- Medications


Medications: 


 Current Medications





Chlordiazepoxide (Librium)  50 mg PO Q8 [facility]


   Last Admin: 01/26/18 06:19 Dose:  Not Given


Docusate Sodium (Colace)  100 mg PO DAILY [facility]


   Last Admin: 01/25/18 09:26 Dose:  100 mg


Folic Acid (Folic Acid)  1 mg PO DAILY [facility]


   Last Admin: 01/25/18 09:26 Dose:  1 mg


Furosemide (Lasix)  40 mg PO DAILY [facility]


   Last Admin: 01/25/18 09:26 Dose:  40 mg


Heparin Sodium (Porcine) (Heparin)  5,000 units SC Q8 [facility]


   Last Admin: 01/25/18 06:18 Dose:  5,000 units


Sodium Chloride (Sodium Chloride 0.45%)  1,000 mls @ 75 mls/hr IV .[id number] [facility]


   Last Admin: 01/25/18 06:29 Dose:  75 mls/hr


Insulin Human Regular (Novolin R)  0 unit SC ACHS [facility]


   PRN Reason: Protocol


   Last Admin: 01/25/18 22:02 Dose:  Not Given


Lisinopril (Zestril)  20 mg PO DAILY [facility]


   Last Admin: 01/25/18 09:26 Dose:  20 mg


Morphine Sulfate (Morphine)  1 mg IV Q4 PRN


   PRN Reason: Pain, moderate (4-7)


Multivitamins/Vitamin C (Multi-Delyn Liquid)  5 ml PO DAILY [facility]


   Last Admin: 01/25/18 09:26 Dose:  5 ml


Pantoprazole Sodium (Protonix Inj)  40 mg IVP Q12H [facility]


   Last Admin: 01/26/18 06:01 Dose:  40 mg


Potassium Chloride (K-Dur 20 Meq Er Tab)  20 meq PO DAILY [facility]


   Last Admin: 01/25/18 09:26 Dose:  20 meq


Propranolol HCl (Inderal)  20 mg PO TID [facility]


   Last Admin: 01/25/18 17:38 Dose:  20 mg


Thiamine HCl (Vitamin B1 Tab)  100 mg PO DAILY [facility]


   Last Admin: 01/25/18 09:26 Dose:  100 mg











- Labs


Labs: 


 





 01/25/18 06:30 





 01/25/18 06:30 





 











APTT  30 SECONDS (21-34)   01/25/18  11:43    














- Additional Findings


Additional findings: 





- Constitutional


Appears: No Acute Distress





- Head Exam


Head Exam: ATRAUMATIC, NORMOCEPHALIC





- Eye Exam


Eye Exam: EOMI





- ENT Exam


ENT Exam: Mucous Membranes Moist





- Respiratory Exam


Respiratory Exam: NORMAL BREATHING PATTERN.  absent: Rales, Rhonchi, Wheezes, 

Respiratory Distress





- Cardiovascular Exam


Cardiovascular Exam: REGULAR RHYTHM, +S1, +S2





- GI/Abdominal Exam


GI & Abdominal Exam: Soft, Normal Bowel Sounds.  absent: Distended, Firm, 

Tenderness





- Neurological Exam


Neurological Exam: Alert, Awake





- Psychiatric Exam


Psychiatric exam: Alert, Awake





- Skin


Skin Exam: Dry, Intact, Normal Color, Warm





Assessment and Plan





- Assessment and Plan (Free Text)


Plan: 


(1) Acute pancreatitis


* Likely secondary to alcohol 


* Advancing diet--> FLD diet


* Imaging:


 * CT of Abdomen/Pelvis: Findings consistent with mild or early acute 

pancreatitis.  New low-density/cystic 1.3 cm mass in body of pancreas. 

Nonspecific.  Possible intrapancreatic pseudocyst from prior pancreatitis.  

Possible IPMN.  Follow-up advised. Fatty infiltration of the liver with sparing 

of the left lobe and mild atrophy of the left lobe. Mildly thickened bladder 

wall but there is poor distension of the urinary bladder.  Rule out cystitis. 

Nonobstructing tiny right renal calculus. Ill-defined opacity in right lower 

lobe.  Rule out pneumonia.  Circumferential mural thickening of distal 

esophagus.  Rule out neoplasm.  Consider evaluation with endoscopy when 

clinically feasible.


* Lipase 2310 --> 2761


* Amylase: 200


* Lipid panel: , Cholesterol 137, LDL 42, HDL 67


* Medications


 * Continue Morphine 1mg IV Q4H prn pain


 * Continue Zofran 4mg IVP Q8H prn N/V


 * 0.45%NS IV fluids 





(2) Hyponatremia


* Hydration with half NS as above


* Continue to monitor labs





(3) Bilirubinemia


* T. Geovanni: 3.0


* Direct:1.0





(4) Elevated LFTs


* Likely secondary to alcohol 


* Resolved





(5) HTN (hypertension)


* Pt did not take medications on day of admission


* Propranolol 20mg po TID


* Lisinopril 20mg daily


* Monitor BP





(6) Diabetes


* Hold home medication of Glimepiride


* Start ISS with Accuchecks Q6H


* Monitor





(7) ETOH abuse


* Starting Librium 50mg Q8 [facility]- HOLD if sleeping or sedated 


 * Discontinued on 1/24/18 Ativan 1mg IV Q6h [facility] and PRN for alcohol withdrawal

- HOLD if sleeping or sedated.


* Thiamine PO daily 


* MV PO daily


* Folic Acid PO daily


* Monitor for withdrawal. Last drink yesterday 1/20/18


* Aspiration precautions


* Seizure precautions





(9) Constipation:


* Colace 100mg PO daily





(10) Anemia


* Hgb 7.1 on 1/23 (decreased from 9.9 at admission)


* Type and cross


* 2 units PRBC given on 1/23--> Hgb improved


* Stool occult: f/u results


* Iron studies: iron 47, TIBC 202, %sat 23, Ferritin 561


* GI consulted, Dr. Quintanilla; recs appreciated


 * Patient went for endoscopy on 1/26/18. Repeat endoscopy in 8 weeks for 

surveillance based on path results


 * Patient needs colonoscopy; tentatively scheduled for Monday, 1/29/18


 * Results: candidiasis esophagitis, gastric ulcer (biopsied); erythematous 

mucosa in prepyloric region of stomach and erythematous duodenopathy


 * Outpatient f/u in 5 weeks


 * Started fluconazole 100mg PO daily for weeks; sucralfate suspension 1gm PO 

BID for 10 weeks; protonix 40mg PO daily for 10 weeks.


* Alpha fetoprotein- f/u results


* CA 19-9: f/u results


* CEA: 8.7 H


* Continue to monitor H/H





(11) Prophylactic measure


* Protonix 40mg IV daily


* Heparin SC - HOLD


* SCDs





Discussed with Dr. Fung.

## 2018-01-27 LAB
ALBUMIN SERPL-MCNC: 2.8 G/DL (ref 3.5–5)
ALBUMIN/GLOB SERPL: 1.1 {RATIO} (ref 1–2.1)
ALT SERPL-CCNC: 29 U/L (ref 21–72)
APTT BLD: 30 SECONDS (ref 21–34)
AST SERPL-CCNC: 45 U/L (ref 17–59)
BASOPHILS # BLD AUTO: 0 K/UL (ref 0–0.2)
BASOPHILS NFR BLD: 1 % (ref 0–2)
BUN SERPL-MCNC: 13 MG/DL (ref 9–20)
CALCIUM SERPL-MCNC: 8 MG/DL (ref 8.6–10.4)
EOSINOPHIL # BLD AUTO: 0.1 K/UL (ref 0–0.7)
EOSINOPHIL NFR BLD: 2.4 % (ref 0–4)
ERYTHROCYTE [DISTWIDTH] IN BLOOD BY AUTOMATED COUNT: 22.7 % (ref 11.5–14.5)
GFR NON-AFRICAN AMERICAN: 55
HGB BLD-MCNC: 9.2 G/DL (ref 12–18)
INR PPP: 1.3
LYMPHOCYTES # BLD AUTO: 0.6 K/UL (ref 1–4.3)
LYMPHOCYTES NFR BLD AUTO: 14.5 % (ref 20–40)
MCH RBC QN AUTO: 29.8 PG (ref 27–31)
MCHC RBC AUTO-ENTMCNC: 33.9 G/DL (ref 33–37)
MCV RBC AUTO: 88.1 FL (ref 80–94)
MONOCYTES # BLD: 0.5 K/UL (ref 0–0.8)
MONOCYTES NFR BLD: 13 % (ref 0–10)
NEUTROPHILS # BLD: 2.7 K/UL (ref 1.8–7)
NEUTROPHILS NFR BLD AUTO: 69.1 % (ref 50–75)
NRBC BLD AUTO-RTO: 0.1 % (ref 0–2)
PLATELET # BLD: 88 K/UL (ref 130–400)
PMV BLD AUTO: 8.4 FL (ref 7.2–11.7)
PROTHROMBIN TIME: 14.6 SECONDS (ref 9.7–12.2)
RBC # BLD AUTO: 3.07 MIL/UL (ref 4.4–5.9)
WBC # BLD AUTO: 3.9 K/UL (ref 4.8–10.8)

## 2018-01-27 RX ADMIN — MAGNESIUM CITRATE SCH ML: 1.75 LIQUID ORAL at 10:11

## 2018-01-27 RX ADMIN — HUMAN INSULIN SCH: 100 INJECTION, SOLUTION SUBCUTANEOUS at 21:22

## 2018-01-27 RX ADMIN — SUCRALFATE SCH GM: 1 SUSPENSION ORAL at 22:10

## 2018-01-27 RX ADMIN — SUCRALFATE SCH GM: 1 SUSPENSION ORAL at 18:14

## 2018-01-27 RX ADMIN — SUCRALFATE SCH GM: 1 SUSPENSION ORAL at 10:50

## 2018-01-27 RX ADMIN — MAGNESIUM CITRATE SCH: 1.75 LIQUID ORAL at 18:14

## 2018-01-27 RX ADMIN — HUMAN INSULIN SCH UNIT: 100 INJECTION, SOLUTION SUBCUTANEOUS at 18:18

## 2018-01-27 RX ADMIN — SUCRALFATE SCH GM: 1 SUSPENSION ORAL at 07:01

## 2018-01-27 RX ADMIN — HUMAN INSULIN SCH UNIT: 100 INJECTION, SOLUTION SUBCUTANEOUS at 08:11

## 2018-01-27 RX ADMIN — Medication SCH ML: at 10:10

## 2018-01-27 RX ADMIN — POTASSIUM CHLORIDE SCH MEQ: 20 TABLET, EXTENDED RELEASE ORAL at 10:10

## 2018-01-27 RX ADMIN — MAGNESIUM CITRATE SCH ML: 1.75 LIQUID ORAL at 13:50

## 2018-01-27 RX ADMIN — HUMAN INSULIN SCH UNIT: 100 INJECTION, SOLUTION SUBCUTANEOUS at 12:27

## 2018-01-27 NOTE — CP.PCM.PN
Subjective





- Date & Time of Evaluation


Date of Evaluation: 01/27/18


Time of Evaluation: 07:27





- Subjective


Subjective: 





PGY-2 note for Dr. Fung's service:





Pt seen and examined at bedside. Nursing reports no acute events overnight. 

Patient found sitting up in bed, alert, and awake; with 1:1 at bedside. CP 

reports pt trying to get out of bed occasionally. Patient denies any pain, and 

has been tolerating diet. He is for colonoscopy with Dr. Quintanilla on Monday.





Objective





- Vital Signs/Intake and Output


Vital Signs (last 24 hours): 


 











Temp Pulse Resp BP Pulse Ox


 


 98.3 F   71   20   165/91 H  100 


 


 01/27/18 04:18  01/27/18 04:18  01/27/18 04:18  01/27/18 04:18  01/27/18 04:18








Intake and Output: 


 











 01/27/18 01/27/18





 06:59 18:59


 


Output Total 1650 


 


Balance -1650 














- Medications


Medications: 


 Current Medications





Chlordiazepoxide (Librium)  50 mg PO Q8 [facility]


   Last Admin: 01/27/18 06:08 Dose:  50 mg


Docusate Sodium (Colace)  100 mg PO DAILY [facility]


   Last Admin: 01/26/18 10:58 Dose:  100 mg


Fluconazole (Diflucan)  100 mg PO DAILY [facility]


   Last Admin: 01/26/18 11:07 Dose:  100 mg


Folic Acid (Folic Acid)  1 mg PO DAILY [facility]


   Last Admin: 01/26/18 10:58 Dose:  1 mg


Furosemide (Lasix)  40 mg PO DAILY [facility]


   Last Admin: 01/26/18 10:58 Dose:  40 mg


Heparin Sodium (Porcine) (Heparin)  5,000 units SC Q8 [facility]


   Last Admin: 01/25/18 06:18 Dose:  5,000 units


Sodium Chloride (Sodium Chloride 0.45%)  1,000 mls @ 75 mls/hr IV .[id number] [facility]


   Last Admin: 01/26/18 19:44 Dose:  75 mls/hr


Insulin Human Regular (Novolin R)  0 unit SC ACHS [facility]


   PRN Reason: Protocol


   Last Admin: 01/26/18 17:37 Dose:  5 unit


Lisinopril (Zestril)  20 mg PO DAILY [facility]


   Last Admin: 01/26/18 10:58 Dose:  20 mg


Magnesium Citrate (Citrate Of Mag)  60 ml PO TID [facility]


   Stop: 01/28/18 10:01


   Last Admin: 01/26/18 17:44 Dose:  60 ml


Morphine Sulfate (Morphine)  1 mg IV Q4 PRN


   PRN Reason: Pain, moderate (4-7)


Multivitamins/Vitamin C (Multi-Delyn Liquid)  5 ml PO DAILY [facility]


   Last Admin: 01/26/18 10:58 Dose:  5 ml


Pantoprazole Sodium (Protonix Inj)  40 mg IVP Q12H [facility]


   Last Admin: 01/27/18 06:07 Dose:  40 mg


Potassium Chloride (K-Dur 20 Meq Er Tab)  20 meq PO DAILY [facility]


   Last Admin: 01/26/18 10:58 Dose:  20 meq


Propranolol HCl (Inderal)  20 mg PO TID [facility]


   Last Admin: 01/26/18 17:34 Dose:  20 mg


Sucralfate (Carafate Oral Susp)  1 gm PO ACHS [facility]


   Last Admin: 01/27/18 07:01 Dose:  1 gm


Thiamine HCl (Vitamin B1 Tab)  100 mg PO DAILY [facility]


   Last Admin: 01/26/18 10:58 Dose:  100 mg











- Labs


Labs: 


 





 01/26/18 06:25 





 01/26/18 06:25 





 











PT  15.3 SECONDS (9.7-12.2)  H  01/26/18  06:25    


 


INR  1.4   01/26/18  06:25    


 


APTT  28 SECONDS (21-34)   01/26/18  06:25    














- Additional Findings


Additional findings: 





- Constitutional


Appears: No Acute Distress





- Head Exam


Head Exam: ATRAUMATIC, NORMOCEPHALIC





- Eye Exam


Eye Exam: EOMI





- ENT Exam


ENT Exam: Mucous Membranes Moist





- Respiratory Exam


Respiratory Exam: NORMAL BREATHING PATTERN.  absent: Rales, Rhonchi, Wheezes, 

Respiratory Distress





- Cardiovascular Exam


Cardiovascular Exam: REGULAR RHYTHM, +S1, +S2





- GI/Abdominal Exam


GI & Abdominal Exam: Soft, Normal Bowel Sounds.  absent: Distended, Firm, 

Tenderness





- Neurological Exam


Neurological Exam: Alert, Awake





- Psychiatric Exam


Psychiatric exam: Alert, Awake





- Skin


Skin Exam: Dry, Intact, Normal Color, Warm





Assessment and Plan





- Assessment and Plan (Free Text)


Plan: 





1) Acute pancreatitis


* Likely secondary to alcohol 


* Advancing diet--> FLD diet


* Imaging:


 * CT of Abdomen/Pelvis: Findings consistent with mild or early acute 

pancreatitis.  New low-density/cystic 1.3 cm mass in body of pancreas. 

Nonspecific.  Possible intrapancreatic pseudocyst from prior pancreatitis.  

Possible IPMN.  Follow-up advised. Fatty infiltration of the liver with sparing 

of the left lobe and mild atrophy of the left lobe. Mildly thickened bladder 

wall but there is poor distension of the urinary bladder.  Rule out cystitis. 

Nonobstructing tiny right renal calculus. Ill-defined opacity in right lower 

lobe.  Rule out pneumonia.  Circumferential mural thickening of distal 

esophagus.  Rule out neoplasm.  Consider evaluation with endoscopy when 

clinically feasible.


* Lipase 2310 --> 2761


* Amylase: 200


* Lipid panel: , Cholesterol 137, LDL 42, HDL 67


* Medications


 * Continue Morphine 1mg IV Q4H prn pain


 * Continue Zofran 4mg IVP Q8H prn N/V


 * 0.45%NS IV fluids 





(2) Hyponatremia


* Na 126 on AM labs - but glucose >300 so corrected Na - 130


* Continue to monitor labs





(3) Bilirubinemia


* T. Geovanni: decreased to 1.6 from 3


* Direct:1.0





(4) Elevated LFTs


* Likely secondary to alcohol 


* Resolved





(5) HTN (hypertension)


* Propranolol 20mg po TID


* Lisinopril 20mg daily


* Monitor BP


* Add hydralazine 10mg IV Q6H PRN for SBP >160





(6) Diabetes


* Hold home medication of Glimepiride


* Increased sliding scale - MISS with Accuchecks Q6H


* Monitor





(7) ETOH abuse


* Continue Librium 50mg Q8 keith- HOLD if sleeping or sedated 


 * Discontinued on 1/24/18 Ativan 1mg IV Q6h [facility] and PRN for alcohol withdrawal

- HOLD if sleeping or sedated.


* Thiamine PO daily 


* MV PO daily


* Folic Acid PO daily


* Monitor for withdrawal. Last drink yesterday 1/20/18


* Aspiration precautions


* Seizure precautions





(9) Constipation:


* Colace 100mg PO daily





(10) Anemia


* HgB holding steady at 9/2


* Hgb 7.1 on 1/23 (decreased from 9.9 at admission)


* Type and cross


* 2 units PRBC given on 1/23--> Hgb improved


* Stool occult: negative x 1, f/u 2 additional occult


* Iron studies: iron 47, TIBC 202, %sat 23, Ferritin 561


* GI consulted, Dr. Quintanilla; recs appreciated


 * Patient went for endoscopy on 1/26/18. Repeat endoscopy in 8 weeks for 

surveillance based on path results


 * Patient needs colonoscopy; tentatively scheduled for Monday, 1/29/18


 * Results: candidiasis esophagitis, gastric ulcer (biopsied); erythematous 

mucosa in prepyloric region of stomach and erythematous duodenopathy


 * Outpatient f/u in 5 weeks


 * Started fluconazole 100mg PO daily for weeks; sucralfate suspension 1gm PO 

BID for 10 weeks; protonix 40mg PO daily for 10 weeks.


* Alpha fetoprotein- f/u results


* CA 19-9: elevated


* CEA: 8.7 H


* Continue to monitor H/H





(11) Prophylactic measure


* Protonix 40mg IV daily


* Heparin SC - HOLD


* SCDs





Discussed with Dr. Fung.

## 2018-01-27 NOTE — CARD
--------------- APPROVED REPORT --------------





EKG Measurement

Heart [id number]

UT [id number]

[id number]

[id number]

[id number]



<Conclusion>

Sinus tachycardia with premature supraventricular complexes and with 

occasional premature ventricular complexes

Otherwise normal ECG

## 2018-01-27 NOTE — PN
DATE:



LOCATION:  [facility], bed B.



SUBJECTIVE:  This is a 67-year-old male seen and examined in rounds early

today.  Appeared to be somewhat awake, alert with period of mild

semi-confusion and slight disorientation on and off.



No reported hematemesis, chest pain or palpitation at this point.



The entire chart is reviewed including but not limited to the most recent

lab and radiology study results, current and the previous medication list,

current and the previous medical events.  Case discussed at length with the

patient as well as his own daughter yesterday by telephone for a period of

30 to 35 minutes to discuss his case.



Today's labs showed white blood cells low 3.9 with low hemoglobin 9.2, low

hematocrit 27.1 despite blood transfusion with thrombocytopenia of 88 with

low sodium 126, blood glucose level 3.3 with low calcium 8.0.  Mildly

elevated total bilirubin to 1.6 with low albumin 2.8 and low total protein

5.5.



The patient has increased CEA level to 8.7 with increased  to 149.



The patient somewhat tolerated some oral intake, but no reported nausea or

vomiting.



PHYSICAL EXAMINATION:

GENERAL:  A 67-year-old male.

VITAL SIGNS:  Afebrile with pulse of 72, respiratory rate 20 to 22, blood

pressure 160/84.

HEENT:  Showed pale dry oral mucous membrane.  Mildly icteric sclerae.

LUNGS:  Few scattered crepitation.  Decreased air entry bilaterally.

HEART:  Positive S1 and S2.

ABDOMEN:  Soft.  Bowel sounds are present with abdominal distention.  No

mass or organomegaly.  No rebound tenderness or guarding.

RECTAL:  The patient refused.

EXTREMITIES:  Without clubbing, cyanosis or edema.

NEUROLOGIC:  No new reported neurological deficits, sensory or motor.



LABORATORY DATA:  It has to be mentioned that ultrasound of the abdomen was

ordered by myself.  Report is seen and CAT scan or MR of the abdomen were

suggested.  Please see report.



IMPRESSION:

1.  Anemia with apparent gastrointestinal blood loss with status post upper

endoscopy indicative of esophageal candidiasis with hiatus hernia and a

small gastric ulcer with gastritis.

2.  Increased CEA level and possibility of lower gastrointestinal tract

occult malignancy should be ruled in or out, the patient is scheduled for

colonoscopy after adequate preparation.

3.  Known history of alcoholism with alcoholic liver disease and mild

jaundice.

4.  Recent history of pancreatitis, alcohol induced.

5.  History of status appendectomy, diabetes mellitus and hypertension by

history.



SUGGESTION:

1.  Continue current management.

2.  Antireflux measures.

3.  Guaiac all the stool daily x3.

4.  Repeat serum lipase, amylase level.

5.  Again, the patient for colonoscopy after adequate preparation.  Then

MRI of the abdomen to be scheduled.  Further recommendation to follow.



This case discussed at length with medical staff.



__________________________________________

Neri Griffin MD





DD:  01/27/2018 8:14:08

DT:  01/27/2018 8:28:06

Job # [id number]

## 2018-01-28 LAB
ALBUMIN SERPL-MCNC: 2.7 G/DL (ref 3.5–5)
ALBUMIN/GLOB SERPL: 0.9 {RATIO} (ref 1–2.1)
ALT SERPL-CCNC: 26 U/L (ref 21–72)
AMYLASE SERPL-CCNC: 32 U/L (ref 30–110)
AST SERPL-CCNC: 29 U/L (ref 17–59)
BASOPHILS # BLD AUTO: 0 K/UL (ref 0–0.2)
BASOPHILS NFR BLD: 0.9 % (ref 0–2)
BUN SERPL-MCNC: 10 MG/DL (ref 9–20)
CALCIUM SERPL-MCNC: 7.9 MG/DL (ref 8.6–10.4)
EOSINOPHIL # BLD AUTO: 0.1 K/UL (ref 0–0.7)
EOSINOPHIL NFR BLD: 2.2 % (ref 0–4)
ERYTHROCYTE [DISTWIDTH] IN BLOOD BY AUTOMATED COUNT: 23.4 % (ref 11.5–14.5)
GFR NON-AFRICAN AMERICAN: 55
HGB BLD-MCNC: 8.7 G/DL (ref 12–18)
LIPASE: 249 U/L (ref 23–300)
LYMPHOCYTES # BLD AUTO: 0.8 K/UL (ref 1–4.3)
LYMPHOCYTES NFR BLD AUTO: 19.2 % (ref 20–40)
MCH RBC QN AUTO: 28.6 PG (ref 27–31)
MCHC RBC AUTO-ENTMCNC: 32.1 G/DL (ref 33–37)
MCV RBC AUTO: 89.2 FL (ref 80–94)
MONOCYTES # BLD: 0.5 K/UL (ref 0–0.8)
MONOCYTES NFR BLD: 12.2 % (ref 0–10)
NEUTROPHILS # BLD: 2.8 K/UL (ref 1.8–7)
NEUTROPHILS NFR BLD AUTO: 65.5 % (ref 50–75)
NRBC BLD AUTO-RTO: 0.1 % (ref 0–2)
PLATELET # BLD: 93 K/UL (ref 130–400)
PMV BLD AUTO: 8.7 FL (ref 7.2–11.7)
RBC # BLD AUTO: 3.04 MIL/UL (ref 4.4–5.9)
WBC # BLD AUTO: 4.3 K/UL (ref 4.8–10.8)

## 2018-01-28 RX ADMIN — MAGNESIUM CITRATE SCH: 1.75 LIQUID ORAL at 19:31

## 2018-01-28 RX ADMIN — SUCRALFATE SCH GM: 1 SUSPENSION ORAL at 07:17

## 2018-01-28 RX ADMIN — TAZOBACTAM SODIUM AND PIPERACILLIN SODIUM SCH MLS/HR: 250; 2 INJECTION, SOLUTION INTRAVENOUS at 18:31

## 2018-01-28 RX ADMIN — HUMAN INSULIN SCH UNIT: 100 INJECTION, SOLUTION SUBCUTANEOUS at 07:47

## 2018-01-28 RX ADMIN — HUMAN INSULIN SCH: 100 INJECTION, SOLUTION SUBCUTANEOUS at 22:09

## 2018-01-28 RX ADMIN — MAGNESIUM CITRATE SCH ML: 1.75 LIQUID ORAL at 14:11

## 2018-01-28 RX ADMIN — Medication SCH ML: at 11:30

## 2018-01-28 RX ADMIN — HUMAN INSULIN SCH UNIT: 100 INJECTION, SOLUTION SUBCUTANEOUS at 13:11

## 2018-01-28 RX ADMIN — HUMAN INSULIN SCH UNIT: 100 INJECTION, SOLUTION SUBCUTANEOUS at 18:18

## 2018-01-28 RX ADMIN — SUCRALFATE SCH GM: 1 SUSPENSION ORAL at 22:13

## 2018-01-28 RX ADMIN — MAGNESIUM CITRATE SCH ML: 1.75 LIQUID ORAL at 11:29

## 2018-01-28 RX ADMIN — SUCRALFATE SCH GM: 1 SUSPENSION ORAL at 11:31

## 2018-01-28 RX ADMIN — SUCRALFATE SCH GM: 1 SUSPENSION ORAL at 16:27

## 2018-01-28 RX ADMIN — TAZOBACTAM SODIUM AND PIPERACILLIN SODIUM SCH MLS/HR: 250; 2 INJECTION, SOLUTION INTRAVENOUS at 23:47

## 2018-01-28 RX ADMIN — POTASSIUM CHLORIDE SCH MEQ: 20 TABLET, EXTENDED RELEASE ORAL at 11:30

## 2018-01-28 NOTE — CP.PCM.PN
Subjective





- Date & Time of Evaluation


Date of Evaluation: 01/28/18


Time of Evaluation: 07:38





- Subjective


Subjective: 





PGY-2 note for Dr. Fung's service:





Pt seen and examined at bedside. Nursing reports no acute events overnight. 

Patient found sitting up in bed, alert, and awake; with 1:1 at bedside. 

Resident paged for increasing cough, congestion. Ordered STAT Lasix. CXR 

ordered showing airspace opacity. Pt is NPO at midnight for colonoscopy in AM 

with Dr. Quintanilla.





Objective





- Vital Signs/Intake and Output


Vital Signs (last 24 hours): 


 











Temp Pulse Resp BP Pulse Ox


 


 98.4 F   80   20   122/67   99 


 


 01/27/18 23:15  01/28/18 04:31  01/27/18 23:15  01/27/18 23:15  01/27/18 23:15








Intake and Output: 


 











 01/28/18 01/28/18





 06:59 18:59


 


Output Total 1200 


 


Balance -1200 














- Medications


Medications: 


 Current Medications





Bisacodyl (Dulcolax)  10 mg PO ONCE ONE


   Stop: 01/28/18 17:01


Chlordiazepoxide (Librium)  [facility] mg PO Q8 [facility]


   Last Admin: 01/28/18 05:44 Dose:  50 mg


Docusate Sodium (Colace)  100 mg PO DAILY [facility]


   Last Admin: 01/27/18 10:10 Dose:  100 mg


Fluconazole (Diflucan)  100 mg PO DAILY [facility]


   Last Admin: 01/27/18 10:22 Dose:  100 mg


Folic Acid (Folic Acid)  1 mg PO DAILY [facility]


   Last Admin: 01/27/18 10:10 Dose:  1 mg


Furosemide (Lasix)  40 mg PO DAILY [facility]


   Last Admin: 01/27/18 10:10 Dose:  40 mg


Heparin Sodium (Porcine) (Heparin)  5,000 units SC Q8 [facility]


   Last Admin: 01/25/18 06:18 Dose:  5,000 units


Hydralazine HCl (Apresoline)  10 mg IVP Q6H PRN


   PRN Reason: Systolic Blood Pressure


   Last Admin: 01/27/18 22:12 Dose:  10 mg


Sodium Chloride (Sodium Chloride 0.45%)  1,000 mls @ 75 mls/hr IV .[id number] [facility]


   Last Admin: 01/28/18 01:50 Dose:  75 mls/hr


Insulin Human Regular (Novolin R)  0 unit SC ACHS [facility]


   PRN Reason: Protocol


Lisinopril (Zestril)  20 mg PO DAILY [facility]


   Last Admin: 01/27/18 10:10 Dose:  20 mg


Magnesium Citrate (Citrate Of Mag)  60 ml PO TID [facility]


   Stop: 01/28/18 10:01


   Last Admin: 01/27/18 18:14 Dose:  Not Given


Metoclopramide HCl (Reglan)  5 mg IVP Q6 [facility]


   Stop: 01/30/18 06:01


   Last Admin: 01/28/18 06:20 Dose:  5 mg


Morphine Sulfate (Morphine)  1 mg IV Q4 PRN


   PRN Reason: Pain, moderate (4-7)


Multivitamins/Vitamin C (Multi-Delyn Liquid)  5 ml PO DAILY [facility]


   Last Admin: 01/27/18 10:10 Dose:  5 ml


Pantoprazole Sodium (Protonix Inj)  40 mg IVP Q12H [facility]


   Last Admin: 01/28/18 05:45 Dose:  40 mg


Polyethylene Glycol/Electrolytes (Golytely)  4,000 ml PO ONCE ONE


   Stop: 01/28/18 10:01


Potassium Chloride (K-Dur 20 Meq Er Tab)  20 meq PO DAILY [facility]


   Last Admin: 01/27/18 10:10 Dose:  20 meq


Propranolol HCl (Inderal)  20 mg PO TID [facility]


   Last Admin: 01/27/18 18:14 Dose:  20 mg


Sucralfate (Carafate Oral Susp)  1 gm PO ACHS [facility]


   Last Admin: 01/28/18 07:17 Dose:  1 gm


Thiamine HCl (Vitamin B1 Tab)  100 mg PO DAILY [facility]


   Last Admin: 01/27/18 10:10 Dose:  100 mg











- Labs


Labs: 


 





 01/27/18 07:24 





 01/27/18 07:24 





 











PT  14.6 SECONDS (9.7-12.2)  H  01/27/18  11:40    


 


INR  1.3   01/27/18  11:40    


 


APTT  30 SECONDS (21-34)   01/27/18  11:40    














- Additional Findings


Additional findings: 





- Constitutional


Appears: No Acute Distress





- Head Exam


Head Exam: ATRAUMATIC, NORMOCEPHALIC





- Eye Exam


Eye Exam: EOMI





- ENT Exam


ENT Exam: Mucous Membranes Moist





- Respiratory Exam


Respiratory Exam: Wheezing, increased congestion, Rales (lower lung zones).  

absent: Rhonchi, Wheezes, Respiratory Distress





- Cardiovascular Exam


Cardiovascular Exam: REGULAR RHYTHM, +S1, +S2





- GI/Abdominal Exam


GI & Abdominal Exam: Soft, Normal Bowel Sounds.  absent: Distended, Firm, 

Tenderness





- Neurological Exam


Neurological Exam: Alert, Awake





- Psychiatric Exam


Psychiatric exam: Alert, Awake





- Skin


Skin Exam: Dry, Intact, Normal Color, Warm





Assessment and Plan





- Assessment and Plan (Free Text)


Plan: 





Acute pancreatitis


* Likely secondary to alcohol 


* Advancing diet--> FLD diet


* Imaging:


 * CT of Abdomen/Pelvis: Findings consistent with mild or early acute 

pancreatitis.  New low-density/cystic 1.3 cm mass in body of pancreas. 

Nonspecific.  Possible intrapancreatic pseudocyst from prior pancreatitis.  

Possible IPMN.  Follow-up advised. Fatty infiltration of the liver with sparing 

of the left lobe and mild atrophy of the left lobe. Mildly thickened bladder 

wall but there is poor distension of the urinary bladder.  Rule out cystitis. 

Nonobstructing tiny right renal calculus. Ill-defined opacity in right lower 

lobe.  Rule out pneumonia.  Circumferential mural thickening of distal 

esophagus.  Rule out neoplasm.  Consider evaluation with endoscopy when 

clinically feasible.


* Lipase 2310 --> 2761


* Amylase: 200


* Lipid panel: , Cholesterol 137, LDL 42, HDL 67


* Medications


 * Continue Morphine 1mg IV Q4H prn pain


 * Continue Zofran 4mg IVP Q8H prn N/V





Venous congestion


CXR 1/28/18 - moderate venous congestion. Left bibasilar airspace opacity. 

Small bilateral pleural effusions.


Lasix 60mg IV STAT given


Most recent ECHO 4/17: LVEF >70%


HELD IVF





Bilateral airspace opacities


Will cover for HAP


CXR 1/28/18 - moderate venous congestion. Left bibasilar airspace opacity. 

Small bilateral pleural effusions.


Vanco 500mg IV ONCE


Zosyn (renal dose) 2.25 gm IV Q6H 








Hyponatremia


* Na 126 on AM labs - but glucose >300 so corrected Na - 130


* Continue to monitor labs





Bilirubinemia


* T. Geovanni: decreased to 1.4 from 3 at admission


* Direct:1.0





Elevated LFTs


* Likely secondary to alcohol 


* Resolved





HTN (hypertension)


* Propranolol 20mg po TID


* Lisinopril 20mg daily


* Monitor BP


* Add hydralazine 10mg IV Q6H PRN for SBP >160





Diabetes


* Hold home medication of Glimepiride


* Increased sliding scale - MISS with Accuchecks Q6H


* Monitor





ETOH abuse


* Continue Librium 50mg Q8 keith- HOLD if sleeping or sedated 


 * Discontinued on 1/24/18 Ativan 1mg IV Q6h KEITH and PRN for alcohol withdrawal

- HOLD if sleeping or sedated.


* Thiamine PO daily 


* MV PO daily


* Folic Acid PO daily


* Monitor for withdrawal. Last drink yesterday 1/20/18


* Aspiration precautions


* Seizure precautions





Constipation:


* Colace 100mg PO daily





Anemia


* HgB decreased to 8.7


* Hgb 7.1 on 1/23 (decreased from 9.9 at admission)


* Type and cross


* 2 units PRBC given on 1/23--> Hgb improved


* Stool occult: negative x 1, f/u 2 additional occult


* Iron studies: iron 47, TIBC 202, %sat 23, Ferritin 561


* GI consulted, Dr. Quintanilla; recs appreciated


 * Patient went for endoscopy on 1/26/18. Repeat endoscopy in 8 weeks for 

surveillance based on path results


 * Patient needs colonoscopy; tentatively scheduled for Monday, 1/29/18


 * Results: candidiasis esophagitis, gastric ulcer (biopsied); erythematous 

mucosa in prepyloric region of stomach and erythematous duodenopathy


 * Outpatient f/u in 5 weeks


 * Started fluconazole 100mg PO daily for weeks; sucralfate suspension 1gm PO 

BID for 10 weeks; protonix 40mg PO daily for 10 weeks.


* Alpha fetoprotein- f/u results


* CA 19-9: elevated


* CEA: 8.7 H


* Continue to monitor H/H





Prophylactic measure


* Protonix 40mg IV daily


* Heparin SC - HOLD


* SCDs





Discussed with Dr. Fung.

## 2018-01-28 NOTE — RAD
Chest x-ray single frontal view 



History: Wheezing and vomiting. 



Comparison: 01/26/2018 



Findings: 



Moderate venous congestion. 



Right hilar prominence. 



Small bilateral pleural effusions. 



Left basilar airspace opacity. 



Tortuous aorta. 



Cardiomegaly. 



Impression: 



Moderate venous congestion. 



Right hilar prominence. 



Small bilateral pleural effusions. 



Left basilar airspace opacity. 



Tortuous aorta. 



Cardiomegaly.

## 2018-01-28 NOTE — PN
DATE:



LOCATION:  [facility], bed B.



SUBJECTIVE:  This is a 67-year-old male seen in rounds with the staff in

the floor with period of disorientation and confusion, but no reported

active bleeding.  No reported chest pain or palpitation or reported

shortness of breath with continuous bowel movement post citrate of

magnesium.



The entire chart is reviewed including but not limited to the most recent

lab and radiology study results, current and the previous medication list,

current and the previous medical events and today's labs still pending. 

Most recent CBC showed pancytopenia with white blood cells 3.9, hemoglobin

dropped to 9.2, hematocrit 27.1 with thrombocytosis of 88 with low sodium

and low chloride with PT of 14.6, but with elevated blood glucose level to

310 this morning with mildly elevated total bilirubin, but low albumin and

low total protein.



PHYSICAL EXAMINATION:

GENERAL:  A 67-year-old male.  The patient appeared to be somewhat

disoriented.

VITAL SIGNS:  With low-grade temperature of 100, pulse of 74, respiratory

rate 20 to 22, blood pressure 140/72.

HEENT:  Showed pale dry oral mucous membrane.  Nonicteric sclerae.

LUNGS:  Few scattered crepitation.  Breathing sounds are present

bilaterally.

HEART:  Positive S1 and S2.

ABDOMEN:  Soft with mild distention with slight generalized tenderness.  No

mass or organomegaly.  No rebound tenderness or guarding.

EXTREMITIES:  With slight lower extremity edematous changes.  No clubbing

or cyanosis.

RECTAL:  Rectal bleeding, the patient refused.

NEUROLOGIC:  No reported new neurological deficits, sensory or motor.  No

reported focal deficits recently.

VASCULAR:  Peripheral pulses positive bilaterally.



IMPRESSION:

1.  Alcoholism with alcoholic liver disease.

2.  Pancytopenia with subsequent drop of hemoglobin and hematocrit, to rule

out lower gastrointestinal blood loss.

3.  Elevated CEA level, to rule out occult gastrointestinal malignancy.

4.  Known history of pancreatitis, alcohol induced.

5.  Hypertension, diabetes mellitus with status post appendectomy by

history.



SUGGESTION:

1.  Continue current management.

2.  The patient for colonoscopy at a.m. after more adequate preparation.



__________________________________________

Neri Griffin MD





DD:  01/28/2018 8:04:20

DT:  01/28/2018 8:17:12

Job # [id number]

## 2018-01-29 LAB
ALBUMIN SERPL-MCNC: 2.8 G/DL (ref 3.5–5)
ALBUMIN/GLOB SERPL: 1 {RATIO} (ref 1–2.1)
ALT SERPL-CCNC: 24 U/L (ref 21–72)
AST SERPL-CCNC: 25 U/L (ref 17–59)
BASOPHILS # BLD AUTO: 0.1 K/UL (ref 0–0.2)
BASOPHILS NFR BLD: 0.8 % (ref 0–2)
BUN SERPL-MCNC: 13 MG/DL (ref 9–20)
CALCIUM SERPL-MCNC: 8.1 MG/DL (ref 8.6–10.4)
EOSINOPHIL # BLD AUTO: 0.1 K/UL (ref 0–0.7)
EOSINOPHIL NFR BLD: 1 % (ref 0–4)
ERYTHROCYTE [DISTWIDTH] IN BLOOD BY AUTOMATED COUNT: 22.7 % (ref 11.5–14.5)
GFR NON-AFRICAN AMERICAN: 36
HGB BLD-MCNC: 9.2 G/DL (ref 12–18)
LYMPHOCYTES # BLD AUTO: 1.1 K/UL (ref 1–4.3)
LYMPHOCYTES NFR BLD AUTO: 15.3 % (ref 20–40)
MCH RBC QN AUTO: 29.2 PG (ref 27–31)
MCHC RBC AUTO-ENTMCNC: 32.9 G/DL (ref 33–37)
MCV RBC AUTO: 88.8 FL (ref 80–94)
MONOCYTES # BLD: 0.6 K/UL (ref 0–0.8)
MONOCYTES NFR BLD: 9 % (ref 0–10)
NEUTROPHILS # BLD: 5.4 K/UL (ref 1.8–7)
NEUTROPHILS NFR BLD AUTO: 73.9 % (ref 50–75)
NRBC BLD AUTO-RTO: 0 % (ref 0–2)
PLATELET # BLD: 122 K/UL (ref 130–400)
PMV BLD AUTO: 8.3 FL (ref 7.2–11.7)
RBC # BLD AUTO: 3.15 MIL/UL (ref 4.4–5.9)
WBC # BLD AUTO: 7.2 K/UL (ref 4.8–10.8)

## 2018-01-29 RX ADMIN — TAZOBACTAM SODIUM AND PIPERACILLIN SODIUM SCH MLS/HR: 250; 2 INJECTION, SOLUTION INTRAVENOUS at 22:51

## 2018-01-29 RX ADMIN — POTASSIUM CHLORIDE SCH MEQ: 20 TABLET, EXTENDED RELEASE ORAL at 09:43

## 2018-01-29 RX ADMIN — HUMAN INSULIN SCH UNIT: 100 INJECTION, SOLUTION SUBCUTANEOUS at 17:30

## 2018-01-29 RX ADMIN — HUMAN INSULIN SCH UNIT: 100 INJECTION, SOLUTION SUBCUTANEOUS at 12:40

## 2018-01-29 RX ADMIN — SUCRALFATE SCH GM: 1 SUSPENSION ORAL at 17:30

## 2018-01-29 RX ADMIN — Medication SCH ML: at 09:42

## 2018-01-29 RX ADMIN — SUCRALFATE SCH GM: 1 SUSPENSION ORAL at 11:24

## 2018-01-29 RX ADMIN — SUCRALFATE SCH GM: 1 SUSPENSION ORAL at 06:41

## 2018-01-29 RX ADMIN — TAZOBACTAM SODIUM AND PIPERACILLIN SODIUM SCH MLS/HR: 250; 2 INJECTION, SOLUTION INTRAVENOUS at 11:23

## 2018-01-29 RX ADMIN — HUMAN INSULIN SCH UNIT: 100 INJECTION, SOLUTION SUBCUTANEOUS at 08:32

## 2018-01-29 RX ADMIN — TAZOBACTAM SODIUM AND PIPERACILLIN SODIUM SCH MLS/HR: 250; 2 INJECTION, SOLUTION INTRAVENOUS at 05:06

## 2018-01-29 RX ADMIN — SUCRALFATE SCH GM: 1 SUSPENSION ORAL at 22:01

## 2018-01-29 RX ADMIN — TAZOBACTAM SODIUM AND PIPERACILLIN SODIUM SCH MLS/HR: 250; 2 INJECTION, SOLUTION INTRAVENOUS at 18:36

## 2018-01-29 RX ADMIN — HUMAN INSULIN SCH UNIT: 100 INJECTION, SOLUTION SUBCUTANEOUS at 22:52

## 2018-01-29 NOTE — PN
DATE:



LOCATION:  3, bed B.



SUBJECTIVE:  This is a 67-year-old male seen and examined in rounds today

with a period of mild semi-confusion, was initially scheduled for

colonoscopy today; however, the patient had the episodes of nausea and

vomiting, unable to tolerate preparation adequately for which the procedure

has to be canceled.  I ordered a chest x-ray yesterday which showed

evidence of left basilar opacity with evidence of cardiomegaly.



No reported chest pain or palpitation but occasional dry cough with no

reported active bleeding.



The entire chart is reviewed including but not limited to the most recent

lab and radiology study results, current and the previous medication list,

current and the previous medical events.  The patient has no leukocytosis

with hemoglobin is still low at 9.2, hematocrit 27.9 with thrombocytopenia

of 122 with low sodium 128, mildly elevated creatinine 1.9, glucose 226,

low calcium 8.1, elevated total protein 2.1 with albumin low of 2.8.



The latest PT is reported to be 14.6 with normal INR and normal PTT.



PHYSICAL EXAMINATION:

GENERAL:  A 67-year-old male.

VITAL SIGNS:  Afebrile with pulse of 72, respiratory rate of 20 to 22,

blood pressure 150/80.

HEENT:  Showed mildly pale, dry oral mucous membrane.  Mildly icteric

sclerae.

LUNGS:  Few scattered crepitation with decreased air entry at bases.

HEART:  Positive S1 and S2.

ABDOMEN:  Soft.  Bowel sounds are present.  No mass or organomegaly.  No

rebound tenderness or guarding.

RECTAL:  The patient refused.

EXTREMITIES:  Without significant clubbing, cyanosis, or edema.

NEUROLOGIC:  No reported new neurological deficits, sensory or motor.

VASCULAR:  Peripheral pulses are present bilaterally.



IMPRESSION:

1.  Alcoholism with alcoholic liver disease with mild jaundice and mildly

elevated total bilirubin due to hepatocellular injury.

2.  Anemia, to rule out lower gastrointestinal tract occult malignancy,

keeping in mind the patient's elevated CEA level, which also could be

secondary to end liver cirrhosis.

3.  Thrombocytopenia secondary to above.

4.  Known history of pancreatitis, alcohol induced.

5.  Known history of poorly-controlled diabetes mellitus, hypertension,

with status post appendectomy by history.



SUGGESTION:

1.  Continue current management.

2.  Clear liquid diet.

3.  The patient for colonoscopy at a.m. when he is more stable.





__________________________________________

Neri Griffin MD



DD:  01/29/2018 12:11:56

DT:  01/29/2018 12:48:51

[facility] # [id number]

## 2018-01-29 NOTE — CP.PCM.PN
Subjective





- Date & Time of Evaluation


Date of Evaluation: 01/29/18


Time of Evaluation: 07:30





- Subjective


Subjective: 





Medicine progress note for Dr. Fung's service





Patient was seen and examined at bedside in the morning. Patient was sitting up 

in bed, alert, and awake, but not oriented. Patient reports feeling better, but 

complains of cough. Patient denies chest pain, shortness of breath, nausea, 

vomiting, abdominal pain, leg pain. 








Objective





- Vital Signs/Intake and Output


Vital Signs (last 24 hours): 


 











Temp Pulse Resp BP Pulse Ox


 


 98.0 F   70   20   155/88 H  99 


 


 01/29/18 08:30  01/29/18 08:30  01/29/18 08:30  01/29/18 09:42  01/29/18 08:30








Intake and Output: 


 











 01/29/18 01/29/18





 06:59 18:59


 


Output Total 150 


 


Balance -150 














- Medications


Medications: 


 Current Medications





Chlordiazepoxide (Librium)  50 mg PO Q8 [facility]


   Last Admin: 01/29/18 07:02 Dose:  Not Given


Docusate Sodium (Colace)  100 mg PO DAILY [facility]


   Last Admin: 01/29/18 09:43 Dose:  Not Given


Fluconazole (Diflucan)  100 mg PO DAILY [facility]


   Last Admin: 01/29/18 09:43 Dose:  100 mg


Folic Acid (Folic Acid)  1 mg PO DAILY [facility]


   Last Admin: 01/29/18 09:42 Dose:  1 mg


Furosemide (Lasix)  40 mg PO DAILY [facility]


   Last Admin: 01/29/18 09:42 Dose:  40 mg


Heparin Sodium (Porcine) (Heparin)  5,000 units SC Q8 [facility]


   Last Admin: 01/25/18 06:18 Dose:  5,000 units


Hydralazine HCl (Apresoline)  10 mg IVP Q6H PRN


   PRN Reason: Systolic Blood Pressure


   Last Admin: 01/28/18 19:17 Dose:  10 mg


Sodium Chloride (Sodium Chloride 0.45%)  1,000 mls @ 75 mls/hr IV .[id number] [facility]


   Last Admin: 01/28/18 01:50 Dose:  75 mls/hr


Piperacillin Sod/Tazobactam Sod (Zosyn 2.25 Gm Iv Premix)  2.25 gm in 50 mls @ 

100 mls/hr IVPB Q6H [facility]


   Last Admin: 01/29/18 11:23 Dose:  100 mls/hr


Insulin Human Regular (Novolin R)  0 unit SC ACHS [facility]


   PRN Reason: Protocol


   Last Admin: 01/29/18 12:40 Dose:  8 unit


Lisinopril (Zestril)  20 mg PO DAILY [facility]


   Last Admin: 01/29/18 09:42 Dose:  20 mg


Metoclopramide HCl (Reglan)  5 mg IVP Q6 [facility]


   Stop: 01/30/18 06:01


   Last Admin: 01/29/18 11:24 Dose:  5 mg


Morphine Sulfate (Morphine)  1 mg IV Q4 PRN


   PRN Reason: Pain, moderate (4-7)


Multivitamins/Vitamin C (Multi-Delyn Liquid)  5 ml PO DAILY [facility]


   Last Admin: 01/29/18 09:42 Dose:  5 ml


Pantoprazole Sodium (Protonix Inj)  40 mg IVP Q12H [facility]


   Last Admin: 01/29/18 06:49 Dose:  40 mg


Potassium Chloride (K-Dur 20 Meq Er Tab)  20 meq PO DAILY [facility]


   Last Admin: 01/29/18 09:43 Dose:  20 meq


Propranolol HCl (Inderal)  20 mg PO TID [facility]


   Last Admin: 01/29/18 09:43 Dose:  20 mg


Sucralfate (Carafate Oral Susp)  1 gm PO ACHS [facility]


   Last Admin: 01/29/18 11:24 Dose:  1 gm


Thiamine HCl (Vitamin B1 Tab)  100 mg PO DAILY [facility]


   Last Admin: 01/29/18 09:43 Dose:  100 mg











- Labs


Labs: 


 





 01/29/18 06:27 





 01/29/18 06:27 





 











PT  14.6 SECONDS (9.7-12.2)  H  01/27/18  11:40    


 


INR  1.3   01/27/18  11:40    


 


APTT  30 SECONDS (21-34)   01/27/18  11:40    














- Additional Findings


Additional findings: 





- Constitutional


Appears: No Acute Distress





- Head Exam


Head Exam: ATRAUMATIC, NORMOCEPHALIC





- Eye Exam


Eye Exam: EOMI





- ENT Exam


ENT Exam: Mucous Membranes Moist





- Respiratory Exam


Respiratory Exam: NORMAL BREATHING PATTERN.  absent: Rales, Rhonchi, Wheezes, 

Respiratory Distress





- Cardiovascular Exam


Cardiovascular Exam: REGULAR RHYTHM, +S1, +S2





- GI/Abdominal Exam


GI & Abdominal Exam: Soft, Normal Bowel Sounds.  absent: Distended, Firm, 

Tenderness





- Neurological Exam


Neurological Exam: Alert, Awake





- Psychiatric Exam


Psychiatric exam: Alert, Awake





- Skin


Skin Exam: Dry, Intact, Normal Color, Warm








Assessment and Plan





- Assessment and Plan (Free Text)


Plan: 





(1) Acute pancreatitis


* Likely secondary to alcohol 


* Advancing diet--> FLD diet


* Imaging:


 * CT of Abdomen/Pelvis: Findings consistent with mild or early acute 

pancreatitis.  New low-density/cystic 1.3 cm mass in body of pancreas. 

Nonspecific.  Possible intrapancreatic pseudocyst from prior pancreatitis.  

Possible IPMN.  Follow-up advised. Fatty infiltration of the liver with sparing 

of the left lobe and mild atrophy of the left lobe. Mildly thickened bladder 

wall but there is poor distension of the urinary bladder.  Rule out cystitis. 

Nonobstructing tiny right renal calculus. Ill-defined opacity in right lower 

lobe.  Rule out pneumonia.  Circumferential mural thickening of distal 

esophagus.  Rule out neoplasm.  Consider evaluation with endoscopy when 

clinically feasible.


* Lipase 2310 --> 2761


* Amylase: 200


* Lipid panel: , Cholesterol 137, LDL 42, HDL 67


* Medications


 * Continue Morphine 1mg IV Q4H prn pain


 * Continue Zofran 4mg IVP Q8H prn N/V


 * 0.45%NS IV fluids 





(2) Venous congestion


* CXR 1/28/18 - moderate venous congestion. Left bibasilar airspace opacity. 

Small bilateral pleural effusions.


* Lasix 60mg IV STAT given


* Most recent ECHO 4/17: LVEF >70%


* HELD IVF





(3) Bilateral airspace opacities


* Will cover for HAP


* CXR 1/28/18 - moderate venous congestion. Left bibasilar airspace opacity. 

Small bilateral pleural effusions.


* Vanco 500mg IV ONCE


* Continue Zosyn (renal dose) 2.25 gm IV Q6H 





(4) Elevated LFTs


* Likely secondary to alcohol 


* Resolved





(5) HTN (hypertension)


* Pt did not take medications on day of admission


* Propranolol 20mg po TID


* Lisinopril 20mg daily


* Monitor BP





(6) Diabetes


* Hold home medication of Glimepiride


* Start ISS with Accuchecks Q6H


* Monitor





(7) ETOH abuse


* Starting Librium 50mg Q8 keith- HOLD if sleeping or sedated 


 * Discontinued on 1/24/18 Ativan 1mg IV Q6h KEITH and PRN for alcohol withdrawal

- HOLD if sleeping or sedated.


* Thiamine PO daily 


* MV PO daily


* Folic Acid PO daily


* Monitor for withdrawal. Last drink yesterday 1/20/18


* Aspiration precautions


* Seizure precautions





(9) Constipation:


* Colace 100mg PO daily





(10) Anemia


* Hgb 7.1 on 1/23 (decreased from 9.9 at admission)


* Type and cross


* 2 units PRBC given on 1/23--> Hgb improved


* Stool occult: negative


* Iron studies: iron 47, TIBC 202, %sat 23, Ferritin 561


* GI consulted, Dr. Quintanilla; recs appreciated


 * Patient went for endoscopy on 1/26/18. Repeat endoscopy in 8 weeks for 

surveillance based on path results


 * Patient needs colonoscopy; postponed due to possible pneumonia


 * Results: candidiasis esophagitis, gastric ulcer (biopsied); erythematous 

mucosa in prepyloric region of stomach and erythematous duodenopathy


 * Outpatient f/u in 5 weeks


 * Started fluconazole 100mg PO daily for weeks; sucralfate suspension 1gm PO 

BID for 10 weeks; protonix 40mg PO daily for 10 weeks.


* Alpha fetoprotein- 2.9, nml


* CA 19-9: 149, elevated


* CEA: 8.7 H


* Continue to monitor H/H





(11) Hyponatremia


* Hydration with half NS as above


* Continue to monitor labs





(12)  Bilirubinemia


* T. Geovanni: 3.0


* Direct:1.0





(13) Prophylactic measure


* Protonix 40mg IV daily


* Heparin SC - HOLD


* SCDs





Discussed with Dr. Fung.

## 2018-01-30 LAB
ANISOCYTOSIS BLD QL SMEAR: SLIGHT
BASOPHILS # BLD AUTO: 0.1 K/UL (ref 0–0.2)
BASOPHILS NFR BLD: 1.1 % (ref 0–2)
EOSINOPHIL # BLD AUTO: 0.1 K/UL (ref 0–0.7)
EOSINOPHIL NFR BLD: 0.9 % (ref 0–4)
EOSINOPHIL NFR BLD: 1 % (ref 0–4)
ERYTHROCYTE [DISTWIDTH] IN BLOOD BY AUTOMATED COUNT: 22.4 % (ref 11.5–14.5)
HGB BLD-MCNC: 9.1 G/DL (ref 12–18)
HYPOCHROMIC: SLIGHT
LYMPHOCYTE: 6 % (ref 20–40)
LYMPHOCYTES # BLD AUTO: 0.8 K/UL (ref 1–4.3)
LYMPHOCYTES NFR BLD AUTO: 9.9 % (ref 20–40)
MCH RBC QN AUTO: 29.5 PG (ref 27–31)
MCHC RBC AUTO-ENTMCNC: 33 G/DL (ref 33–37)
MCV RBC AUTO: 89.5 FL (ref 80–94)
MONOCYTE: 4 % (ref 0–10)
MONOCYTES # BLD: 0.6 K/UL (ref 0–0.8)
MONOCYTES NFR BLD: 6.8 % (ref 0–10)
NEUTROPHILS # BLD: 6.6 K/UL (ref 1.8–7)
NEUTROPHILS NFR BLD AUTO: 81.3 % (ref 50–75)
NEUTROPHILS NFR BLD AUTO: 89 % (ref 50–75)
NRBC BLD AUTO-RTO: 0 % (ref 0–2)
PLATELET # BLD EST: NORMAL 10*3/UL
PLATELET # BLD: 138 K/UL (ref 130–400)
PMV BLD AUTO: 8.9 FL (ref 7.2–11.7)
POLYCHROMIC: SLIGHT
RBC # BLD AUTO: 3.09 MIL/UL (ref 4.4–5.9)
TARGETS BLD QL SMEAR: SLIGHT
TOTAL CELLS COUNTED BLD: 100
WBC # BLD AUTO: 8.2 K/UL (ref 4.8–10.8)

## 2018-01-30 PROCEDURE — 0DBM8ZX EXCISION OF DESCENDING COLON, VIA NATURAL OR ARTIFICIAL OPENING ENDOSCOPIC, DIAGNOSTIC: ICD-10-PCS | Performed by: SPECIALIST

## 2018-01-30 RX ADMIN — HUMAN INSULIN SCH: 100 INJECTION, SOLUTION SUBCUTANEOUS at 21:30

## 2018-01-30 RX ADMIN — SUCRALFATE SCH: 1 SUSPENSION ORAL at 08:03

## 2018-01-30 RX ADMIN — SUCRALFATE SCH GM: 1 SUSPENSION ORAL at 10:38

## 2018-01-30 RX ADMIN — SUCRALFATE SCH: 1 SUSPENSION ORAL at 17:28

## 2018-01-30 RX ADMIN — TAZOBACTAM SODIUM AND PIPERACILLIN SODIUM SCH MLS/HR: 250; 2 INJECTION, SOLUTION INTRAVENOUS at 17:23

## 2018-01-30 RX ADMIN — HUMAN INSULIN SCH: 100 INJECTION, SOLUTION SUBCUTANEOUS at 07:25

## 2018-01-30 RX ADMIN — HUMAN INSULIN SCH UNIT: 100 INJECTION, SOLUTION SUBCUTANEOUS at 12:54

## 2018-01-30 RX ADMIN — TAZOBACTAM SODIUM AND PIPERACILLIN SODIUM SCH MLS/HR: 250; 2 INJECTION, SOLUTION INTRAVENOUS at 10:38

## 2018-01-30 RX ADMIN — SUCRALFATE SCH GM: 1 SUSPENSION ORAL at 17:24

## 2018-01-30 RX ADMIN — POTASSIUM CHLORIDE SCH MEQ: 20 TABLET, EXTENDED RELEASE ORAL at 09:50

## 2018-01-30 RX ADMIN — Medication SCH ML: at 09:51

## 2018-01-30 RX ADMIN — HUMAN INSULIN SCH UNIT: 100 INJECTION, SOLUTION SUBCUTANEOUS at 17:24

## 2018-01-30 RX ADMIN — SUCRALFATE SCH: 1 SUSPENSION ORAL at 21:30

## 2018-01-30 RX ADMIN — TAZOBACTAM SODIUM AND PIPERACILLIN SODIUM SCH MLS/HR: 250; 2 INJECTION, SOLUTION INTRAVENOUS at 22:35

## 2018-01-30 RX ADMIN — TAZOBACTAM SODIUM AND PIPERACILLIN SODIUM SCH MLS/HR: 250; 2 INJECTION, SOLUTION INTRAVENOUS at 05:05

## 2018-01-30 NOTE — CP.PCM.PN
Subjective





- Date & Time of Evaluation


Date of Evaluation: 01/30/18


Time of Evaluation: 15:07





- Subjective


Subjective: 


Medicine progress note for Dr. Fung's service





Patient was seen and examined at bedside. Patient s/p colonscopy, complaining 

of vomiting, but denies nausea. As per nursing, patient appears to have 

difficulty swallowing water, but can tolerate apple sauce. As per nursing, 

patient is also having loose bowels s/p colonoscopy. Patient appears lethargic, 

mumbling, and confused. Review of systems limited due to patients condition.





Objective





- Vital Signs/Intake and Output


Vital Signs (last 24 hours): 


 











Temp Pulse Resp BP Pulse Ox


 


 97 F L  68   18   118/61   95 


 


 01/30/18 08:50  01/30/18 13:48  01/30/18 13:48  01/30/18 13:48  01/30/18 13:48








Intake and Output: 


 











 01/30/18 01/30/18





 06:59 18:59


 


Intake Total 900 


 


Output Total 800 600


 


Balance 100 -600














- Medications


Medications: 


 Current Medications





Chlordiazepoxide (Librium)  50 mg PO Q8 PRN


   PRN Reason: Agitation


Docusate Sodium (Colace)  100 mg PO DAILY [facility]


   Last Admin: 01/30/18 09:33 Dose:  Not Given


Fluconazole (Diflucan)  100 mg PO DAILY [facility]


   Last Admin: 01/30/18 09:50 Dose:  100 mg


Folic Acid (Folic Acid)  1 mg PO DAILY [facility]


   Last Admin: 01/30/18 09:50 Dose:  1 mg


Furosemide (Lasix)  40 mg PO DAILY [facility]


   Last Admin: 01/30/18 09:50 Dose:  40 mg


Heparin Sodium (Porcine) (Heparin)  5,000 units SC Q8 [facility]


   Last Admin: 01/25/18 06:18 Dose:  5,000 units


Hydralazine HCl (Apresoline)  10 mg IVP Q6H PRN


   PRN Reason: Systolic Blood Pressure


   Last Admin: 01/30/18 09:49 Dose:  10 mg


Sodium Chloride (Sodium Chloride 0.45%)  1,000 mls @ 75 mls/hr IV .[id number] [facility]


   Last Admin: 01/28/18 01:50 Dose:  75 mls/hr


Piperacillin Sod/Tazobactam Sod (Zosyn 2.25 Gm Iv Premix)  2.25 gm in 50 mls @ 

100 mls/hr IVPB Q6H [facility]


   Last Admin: 01/30/18 10:38 Dose:  100 mls/hr


Lactated Ringer's (Lactated Ringer's 500ml)  500 mls @ 75 mls/hr IV .[id number] [facility]


Insulin Human Regular (Novolin R)  0 unit SC ACHS [facility]


   PRN Reason: Protocol


   Last Admin: 01/30/18 12:54 Dose:  10 unit


Lisinopril (Zestril)  20 mg PO DAILY [facility]


   Last Admin: 01/30/18 09:50 Dose:  20 mg


Multivitamins/Vitamin C (Multi-Delyn Liquid)  5 ml PO DAILY [facility]


   Last Admin: 01/30/18 09:51 Dose:  5 ml


Pantoprazole Sodium (Protonix Inj)  40 mg IVP Q12H [facility]


   Last Admin: 01/30/18 06:15 Dose:  40 mg


Potassium Chloride (K-Dur 20 Meq Er Tab)  20 meq PO DAILY [facility]


   Last Admin: 01/30/18 09:50 Dose:  20 meq


Propranolol HCl (Inderal)  20 mg PO TID [facility]


   Last Admin: 01/30/18 13:52 Dose:  20 mg


Sucralfate (Carafate Oral Susp)  1 gm PO ACHS [facility]


   Last Admin: 01/30/18 10:38 Dose:  1 gm


Thiamine HCl (Vitamin B1 Tab)  100 mg PO DAILY [facility]


   Last Admin: 01/30/18 09:50 Dose:  100 mg











- Labs


Labs: 


 





 01/30/18 06:40 





 01/29/18 06:27 





 











PT  14.6 SECONDS (9.7-12.2)  H  01/27/18  11:40    


 


INR  1.3   01/27/18  11:40    


 


APTT  30 SECONDS (21-34)   01/27/18  11:40    














- Additional Findings


Additional findings: 





- Constitutional


Appears: No Acute Distress, confused





- Head Exam


Head Exam: ATRAUMATIC, NORMOCEPHALIC





- Eye Exam


Eye Exam: EOMI





- ENT Exam


ENT Exam: Mucous Membranes Moist





- Respiratory Exam


Respiratory Exam: NORMAL BREATHING PATTERN, Wheezes.  absent: Rales, Rhonchi, 

Respiratory Distress





- Cardiovascular Exam


Cardiovascular Exam: REGULAR RHYTHM, +S1, +S2





- GI/Abdominal Exam


GI & Abdominal Exam: Soft, Normal Bowel Sounds.  absent: Distended, Firm, 

Tenderness





- Neurological Exam


Neurological Exam: Alert, Awake





- Psychiatric Exam


Psychiatric exam: Alert, Awake





- Skin


Skin Exam: Dry, Intact, Normal Color, Warm





Assessment and Plan





- Assessment and Plan (Free Text)


Plan: 





Plan: 





(1) Acute pancreatitis


* Likely secondary to alcohol 


* Advancing diet--> FLD diet


* Imaging:


 * CT of Abdomen/Pelvis: Findings consistent with mild or early acute 

pancreatitis.  New low-density/cystic 1.3 cm mass in body of pancreas. 

Nonspecific.  Possible intrapancreatic pseudocyst from prior pancreatitis.  

Possible IPMN.  Follow-up advised. Fatty infiltration of the liver with sparing 

of the left lobe and mild atrophy of the left lobe. Mildly thickened bladder 

wall but there is poor distension of the urinary bladder.  Rule out cystitis. 

Nonobstructing tiny right renal calculus. Ill-defined opacity in right lower 

lobe.  Rule out pneumonia.  Circumferential mural thickening of distal 

esophagus.  Rule out neoplasm.  Consider evaluation with endoscopy when 

clinically feasible.


* Lipase 2310 --> 2761


* Amylase: 200


* Lipid panel: , Cholesterol 137, LDL 42, HDL 67


* Medications


 * Continue Morphine 1mg IV Q4H prn pain


 * Continue Zofran 4mg IVP Q8H prn N/V


 * 0.45%NS IV fluids 





(2) Venous congestion


* CXR 1/28/18 - moderate venous congestion. Left bibasilar airspace opacity. 

Small bilateral pleural effusions.


* Lasix 60mg IV STAT given


* Most recent ECHO 4/17: LVEF >70%


* HELD IVF


* Ordered duoneb once on 1/30/18 for wheezing.





(3) Bilateral airspace opacities


* Will cover for HAP


* CXR 1/28/18 - moderate venous congestion. Left bibasilar airspace opacity. 

Small bilateral pleural effusions.


* Vanco 500mg IV ONCE


* Continue Zosyn (renal dose) 2.25 gm IV Q6H 





(4) Elevated LFTs


* Likely secondary to alcohol 


* Resolved





(5) HTN (hypertension)


* Pt did not take medications on day of admission


* Propranolol 20mg po TID


* Lisinopril 20mg daily


* Monitor BP





(6) Diabetes


* Hold home medication of Glimepiride


* Start ISS with Accuchecks Q6H


* Monitor





(7) ETOH abuse


* Starting Librium 50mg Q8 keith- HOLD if sleeping or sedated 


 * Discontinued on 1/24/18 Ativan 1mg IV Q6h KEITH and PRN for alcohol withdrawal

- HOLD if sleeping or sedated.


* Thiamine PO daily 


* MV PO daily


* Folic Acid PO daily


* Monitor for withdrawal. Last drink yesterday 1/20/18


* Aspiration precautions


* Seizure precautions





(9) Constipation:


* Resolved


* Colace 100mg PO daily





(10) Anemia


* Hgb 7.1 on 1/23 (decreased from 9.9 at admission)


* Type and cross


* 2 units PRBC given on 1/23--> Hgb improved


* Stool occult: negative


* Iron studies: iron 47, TIBC 202, %sat 23, Ferritin 561


* GI consulted, Dr. Quintanilla; recs appreciated


 * Patient went for endoscopy on 1/26/18. Repeat endoscopy in 8 weeks for 

surveillance based on path results


 * Patient needs colonoscopy; postponed due to possible pneumonia


 * Results: candidiasis esophagitis, gastric ulcer (biopsied); erythematous 

mucosa in prepyloric region of stomach and erythematous duodenopathy


 * Outpatient f/u in 5 weeks


 * Started fluconazole 100mg PO daily for weeks; sucralfate suspension 1gm PO 

BID for 10 weeks; protonix 40mg PO daily for 10 weeks.


* Alpha fetoprotein- 2.9, nml


* CA 19-9: 149, elevated


* CEA: 8.7 H


* Continue to monitor H/H





(11) Hyponatremia


* Hydration with half NS as above


* Continue to monitor labs





(12)  Bilirubinemia


* T. Geovanni: 3.0


* Direct:1.0





(13) Prophylactic measure


* Protonix 40mg IV daily


* Heparin SC - HOLD


* SCDs


* Aspiration precautions


* Swallow eval





Discussed with Dr. Fung.





Disposition: Patient is s/p colonscopy and endoscopy. Patient awaiting 

authorization from insurance for discharge to [facility].

## 2018-01-31 LAB
ALBUMIN SERPL-MCNC: 2.6 G/DL (ref 3.5–5)
ALBUMIN SERPL-MCNC: 2.7 G/DL (ref 3.5–5)
ALBUMIN/GLOB SERPL: 0.9 {RATIO} (ref 1–2.1)
ALBUMIN/GLOB SERPL: 0.9 {RATIO} (ref 1–2.1)
ALT SERPL-CCNC: 20 U/L (ref 21–72)
ALT SERPL-CCNC: 25 U/L (ref 21–72)
ANISOCYTOSIS BLD QL SMEAR: (no result)
ANISOCYTOSIS BLD QL SMEAR: (no result)
AST SERPL-CCNC: 18 U/L (ref 17–59)
AST SERPL-CCNC: 20 U/L (ref 17–59)
BACTERIA #/AREA URNS HPF: (no result) /[HPF]
BASOPHILS # BLD AUTO: 0.1 K/UL (ref 0–0.2)
BASOPHILS # BLD AUTO: 0.1 K/UL (ref 0–0.2)
BASOPHILS NFR BLD: 0.4 % (ref 0–2)
BASOPHILS NFR BLD: 0.6 % (ref 0–2)
BILIRUB UR-MCNC: NEGATIVE MG/DL
BUN SERPL-MCNC: 18 MG/DL (ref 9–20)
BUN SERPL-MCNC: 21 MG/DL (ref 9–20)
CALCIUM SERPL-MCNC: 8 MG/DL (ref 8.6–10.4)
CALCIUM SERPL-MCNC: 8.3 MG/DL (ref 8.6–10.4)
EOSINOPHIL # BLD AUTO: 0.1 K/UL (ref 0–0.7)
EOSINOPHIL # BLD AUTO: 0.1 K/UL (ref 0–0.7)
EOSINOPHIL NFR BLD: 0.5 % (ref 0–4)
EOSINOPHIL NFR BLD: 0.7 % (ref 0–4)
EOSINOPHIL NFR BLD: 1 % (ref 0–4)
ERYTHROCYTE [DISTWIDTH] IN BLOOD BY AUTOMATED COUNT: 22.2 % (ref 11.5–14.5)
ERYTHROCYTE [DISTWIDTH] IN BLOOD BY AUTOMATED COUNT: 22.5 % (ref 11.5–14.5)
GFR NON-AFRICAN AMERICAN: 32
GFR NON-AFRICAN AMERICAN: 32
GLUCOSE UR STRIP-MCNC: (no result) MG/DL
HGB BLD-MCNC: 8.5 G/DL (ref 12–18)
HGB BLD-MCNC: 8.8 G/DL (ref 12–18)
LEUKOCYTE ESTERASE UR-ACNC: (no result) LEU/UL
LYMPHOCYTE: 6 % (ref 20–40)
LYMPHOCYTE: 7 % (ref 20–40)
LYMPHOCYTES # BLD AUTO: 0.7 K/UL (ref 1–4.3)
LYMPHOCYTES # BLD AUTO: 0.9 K/UL (ref 1–4.3)
LYMPHOCYTES NFR BLD AUTO: 5.6 % (ref 20–40)
LYMPHOCYTES NFR BLD AUTO: 9.2 % (ref 20–40)
MCH RBC QN AUTO: 28.8 PG (ref 27–31)
MCH RBC QN AUTO: 29.8 PG (ref 27–31)
MCHC RBC AUTO-ENTMCNC: 32.4 G/DL (ref 33–37)
MCHC RBC AUTO-ENTMCNC: 33.2 G/DL (ref 33–37)
MCV RBC AUTO: 88.9 FL (ref 80–94)
MCV RBC AUTO: 89.7 FL (ref 80–94)
MONOCYTE: 3 % (ref 0–10)
MONOCYTE: 6 % (ref 0–10)
MONOCYTES # BLD: 0.5 K/UL (ref 0–0.8)
MONOCYTES # BLD: 0.6 K/UL (ref 0–0.8)
MONOCYTES NFR BLD: 5 % (ref 0–10)
MONOCYTES NFR BLD: 5.1 % (ref 0–10)
NEUTROPHILS # BLD: 11.2 K/UL (ref 1.8–7)
NEUTROPHILS # BLD: 8.1 K/UL (ref 1.8–7)
NEUTROPHILS NFR BLD AUTO: 84.5 % (ref 50–75)
NEUTROPHILS NFR BLD AUTO: 87 % (ref 50–75)
NEUTROPHILS NFR BLD AUTO: 88 % (ref 50–75)
NEUTROPHILS NFR BLD AUTO: 88.4 % (ref 50–75)
NEUTS BAND NFR BLD: 2 % (ref 0–2)
NRBC BLD AUTO-RTO: 0 % (ref 0–2)
NRBC BLD AUTO-RTO: 0 % (ref 0–2)
OVALOCYTES BLD QL SMEAR: SLIGHT
PH UR STRIP: 6 [PH] (ref 5–8)
PLATELET # BLD EST: NORMAL 10*3/UL
PLATELET # BLD EST: NORMAL 10*3/UL
PLATELET # BLD: 156 K/UL (ref 130–400)
PLATELET # BLD: 163 K/UL (ref 130–400)
PMV BLD AUTO: 8.7 FL (ref 7.2–11.7)
PMV BLD AUTO: 9.2 FL (ref 7.2–11.7)
POLYCHROMIC: SLIGHT
PROT UR STRIP-MCNC: (no result) MG/DL
RBC # BLD AUTO: 2.85 MIL/UL (ref 4.4–5.9)
RBC # BLD AUTO: 3.06 MIL/UL (ref 4.4–5.9)
RBC # UR STRIP: (no result) /UL
SP GR UR STRIP: 1.01 (ref 1–1.03)
TOTAL CELLS COUNTED BLD: 100
TOTAL CELLS COUNTED BLD: 100
URINE NITRATE: NEGATIVE
UROBILINOGEN UR-MCNC: NORMAL MG/DL (ref 0.2–1)
WBC # BLD AUTO: 12.6 K/UL (ref 4.8–10.8)
WBC # BLD AUTO: 9.6 K/UL (ref 4.8–10.8)

## 2018-01-31 RX ADMIN — SUCRALFATE SCH GM: 1 SUSPENSION ORAL at 17:30

## 2018-01-31 RX ADMIN — SUCRALFATE SCH GM: 1 SUSPENSION ORAL at 11:55

## 2018-01-31 RX ADMIN — HUMAN INSULIN SCH UNIT: 100 INJECTION, SOLUTION SUBCUTANEOUS at 23:06

## 2018-01-31 RX ADMIN — TAZOBACTAM SODIUM AND PIPERACILLIN SODIUM SCH MLS/HR: 250; 2 INJECTION, SOLUTION INTRAVENOUS at 18:00

## 2018-01-31 RX ADMIN — Medication SCH ML: at 10:58

## 2018-01-31 RX ADMIN — HUMAN INSULIN SCH UNIT: 100 INJECTION, SOLUTION SUBCUTANEOUS at 08:12

## 2018-01-31 RX ADMIN — TAZOBACTAM SODIUM AND PIPERACILLIN SODIUM SCH MLS/HR: 250; 2 INJECTION, SOLUTION INTRAVENOUS at 23:03

## 2018-01-31 RX ADMIN — HUMAN INSULIN SCH UNIT: 100 INJECTION, SOLUTION SUBCUTANEOUS at 12:30

## 2018-01-31 RX ADMIN — SUCRALFATE SCH GM: 1 SUSPENSION ORAL at 06:34

## 2018-01-31 RX ADMIN — POTASSIUM CHLORIDE SCH MEQ: 20 TABLET, EXTENDED RELEASE ORAL at 11:00

## 2018-01-31 RX ADMIN — HUMAN INSULIN SCH UNIT: 100 INJECTION, SOLUTION SUBCUTANEOUS at 17:30

## 2018-01-31 RX ADMIN — TAZOBACTAM SODIUM AND PIPERACILLIN SODIUM SCH MLS/HR: 250; 2 INJECTION, SOLUTION INTRAVENOUS at 05:35

## 2018-01-31 RX ADMIN — SUCRALFATE SCH GM: 1 SUSPENSION ORAL at 22:58

## 2018-01-31 RX ADMIN — TAZOBACTAM SODIUM AND PIPERACILLIN SODIUM SCH MLS/HR: 250; 2 INJECTION, SOLUTION INTRAVENOUS at 14:06

## 2018-01-31 NOTE — PN
DATE:



LOCATION:  Room 664, bed B.



SUBJECTIVE:  This is a 67-year-old male seen early in rounds today without

significant clinical changes, for which rapid response was apparently

called as the patient was unresponsive, appeared to be lethargic, until

subsequently became awake after the rapid response effort was performed.



It has to be mentioned that the patient still has period of semi-confusion,

but no reported nausea or vomiting, no reported chest pain, significant

shortness of breath, or palpitation.



The entire chart is reviewed including but not limited to the most recent

lab and radiology study results, current and the previous medication list,

current and the previous medical events.  The patient is post upper and

lower endoscopy and the case discussed with the staff at length.



Today's lab showed hemoglobin dropped to 8.5, hematocrit 25.6, but normal

platelet count with low CO2 content of 20, BUN increased to 21, creatinine

2.1, blood glucose level 306, with low calcium 8.1, increased total

bilirubin 12.6 with low albumin 2.6, low total protein 5.6.



PHYSICAL EXAMINATION:

GENERAL:  A 67-year-old male, appeared to be somnolent, awake, semi-alert,

but with period of confusion and semi-disorientation.

VITAL SIGNS:  Afebrile with pulse of 66, respiratory rate 20 to 22, blood

pressure of 128/76.

HEENT:  Show mildly pale, dry oral mucous membrane.  Nonicteric sclerae.

LUNGS:  Few scattered crepitation with decreased air entry at bases.

HEART:  Positive S1 and S2.

ABDOMEN:  Soft with mild generalized tenderness, mildly distended.  No mass

or organomegaly.  No rebound tenderness or guarding.

RECTAL:  The patient refused.

EXTREMITIES:  Without significant clubbing, cyanosis, or edema.

NEUROLOGIC:  No reported new neurological deficits, sensory or motor.



Official pathology report from the colonoscopy is seen, indicative of

inflammatory changes and gastric mucosa pathology report was negative for

Helicobacter pylori infection.



IMPRESSION:

1.  Diverticulosis with left-sided colitis.

2.  Peptic ulcer disease.

3.  Anemia, most likely secondary to above.

4.  Alcoholism with alcoholic liver disease with mild jaundice.

5.  Thrombocytopenia secondary to above.

6.  Known history of alcohol-induced pancreatitis by history.

7.  Poorly controlled hypertension, diabetes mellitus, known history of

status post appendectomy.



SUGGESTIONS:

1.  Continue current management.

2.  The patient may benefit from Endocrinology consult.



Further evaluation and recommendation to follow and follow up colonoscopy

after 10 years is advised, otherwise as indicated.





__________________________________________

Neri Griffin MD



DD:  01/31/2018 12:15:04

DT:  01/31/2018 15:14:01

Job # [id number]

## 2018-01-31 NOTE — PCM.RRT
RRT Nurses Assessment





- Situation


Date: 01/31/18


Time RRT was called: 00:30


RRT Responder Arrival Time:: 00:32


RRT Reason for Call: Change in Mental Status (non-responsive)


New IV Insertion Tolerance: Good





Plan





- Assessment of Findings&Treatment Plan





RRT was called at 12:30am for non-responsiveness. Upon examination patient was 

initially lethargic and slow to respond, however with tactile stimulation he 

became more alert, talkative and responsive. Patient is undergoing withdrawal 

and was most likely sleeping. Nursing staff reported that he had a fever 101.5 

currently for which he was to receive tylenol but when the nurse went to give 

him the medication he wad found nonresponsive. Blood cx x2, CBC, CMP, UA, 

ammonia level were ordered for stat labs. IV caldolor x 1 dose was initially 

ordered for antipyretic for  coverage. Finger stick was done at bedside and was 

>200. Vitals were rechecked: T- 98.2 and BP: 117/68. Caldolor was held given 

the lowered temperature reading. The patient was alert, talkative, afebrile at 

the end of this encounter. PMD Dr. Fung to be notified.

## 2018-01-31 NOTE — CP.PCM.PN
Subjective





- Date & Time of Evaluation


Date of Evaluation: 01/31/18


Time of Evaluation: 07:53





- Subjective


Subjective: 





Medicine progress note for Dr. Fung's service





Patient was seen and examined at bedside. Patient is alert, but disoriented, 

confused. Patient states he is no longer vomiting, denies nausea, chest pain, 

fevers, headaches, abdominal pain. Review of systems limited due to patients 

condition. RRT was called overnight for "patient unresponsive"; however, when 

evaluated by overnight team, patient was alert and responsive. 





Objective





- Vital Signs/Intake and Output


Vital Signs (last 24 hours): 


 











Temp Pulse Resp BP Pulse Ox


 


 98.5 F   71   20   91/60 L  97 


 


 01/31/18 04:23  01/31/18 04:00  01/30/18 23:00  01/30/18 23:00  01/30/18 23:00








Intake and Output: 


 











 01/31/18 01/31/18





 06:59 18:59


 


Intake Total 600 


 


Output Total 700 


 


Balance -100 














- Medications


Medications: 


 Current Medications





Chlordiazepoxide (Librium)  50 mg PO Q8 PRN


   PRN Reason: Agitation


Docusate Sodium (Colace)  100 mg PO DAILY [facility]


   Last Admin: 01/30/18 09:33 Dose:  Not Given


Fluconazole (Diflucan)  100 mg PO DAILY [facility]


   Last Admin: 01/30/18 09:50 Dose:  100 mg


Folic Acid (Folic Acid)  1 mg PO DAILY [facility]


   Last Admin: 01/30/18 09:50 Dose:  1 mg


Furosemide (Lasix)  40 mg PO DAILY [facility]


   Last Admin: 01/30/18 09:50 Dose:  40 mg


Heparin Sodium (Porcine) (Heparin)  5,000 units SC Q8 [facility]


   Last Admin: 01/25/18 06:18 Dose:  5,000 units


Hydralazine HCl (Apresoline)  10 mg IVP Q6H PRN


   PRN Reason: Systolic Blood Pressure


   Last Admin: 01/30/18 17:23 Dose:  10 mg


Sodium Chloride (Sodium Chloride 0.45%)  1,000 mls @ 75 mls/hr IV .[id number] [facility]


   Last Admin: 01/28/18 01:50 Dose:  75 mls/hr


Piperacillin Sod/Tazobactam Sod (Zosyn 2.25 Gm Iv Premix)  2.25 gm in 50 mls @ 

100 mls/hr IVPB Q6H [facility]


   Last Admin: 01/31/18 05:35 Dose:  100 mls/hr


Lactated Ringer's (Lactated Ringer's 500ml)  500 mls @ 75 mls/hr IV .Q6H40M [facility]


   Last Admin: 01/31/18 05:39 Dose:  75 mls/hr


Insulin Human Regular (Novolin R)  [facility]


   PRN Reason: Protocol


   Last Admin: 01/30/18 21:30 Dose:  Not Given


Lisinopril (Zestril)  20 mg PO DAILY [facility]


   Last Admin: 01/30/18 09:50 Dose:  20 mg


Multivitamins/Vitamin C (Multi-Delyn Liquid)  5 ml PO DAILY [facility]


   Last Admin: 01/30/18 09:51 Dose:  5 ml


Pantoprazole Sodium (Protonix Inj)  40 mg IVP Q12H [facility]


   Last Admin: 01/31/18 05:35 Dose:  40 mg


Potassium Chloride (K-Dur 20 Meq Er Tab)  20 meq PO DAILY [facility]


   Last Admin: 01/30/18 09:50 Dose:  20 meq


Propranolol HCl (Inderal)  20 mg PO TID [facility]


   Last Admin: 01/30/18 17:29 Dose:  Not Given


Sucralfate (Carafate Oral Susp)  1 gm PO ACHS [facility]


   Last Admin: 01/31/18 06:34 Dose:  1 gm


Thiamine HCl (Vitamin B1 Tab)  100 mg PO DAILY [facility]


   Last Admin: 01/30/18 09:50 Dose:  100 mg











- Labs


Labs: 


 





 01/31/18 06:43 





 01/31/18 06:43 





 











PT  14.6 SECONDS (9.7-12.2)  H  01/27/18  11:40    


 


INR  1.3   01/27/18  11:40    


 


APTT  30 SECONDS (21-34)   01/27/18  11:40    














- Additional Findings


Additional findings: 





- Constitutional


Appears: No Acute Distress, confused





- Head Exam


Head Exam: ATRAUMATIC, NORMOCEPHALIC





- Eye Exam


Eye Exam: EOMI





- ENT Exam


ENT Exam: Mucous Membranes Moist





- Respiratory Exam


Respiratory Exam: NORMAL BREATHING PATTERN, decreased breath sounds.  absent: 

Rales, Rhonchi, Respiratory Distress





- Cardiovascular Exam


Cardiovascular Exam: REGULAR RHYTHM, +S1, +S2





- GI/Abdominal Exam


GI & Abdominal Exam: Soft, Normal Bowel Sounds.  absent: Distended, Firm, 

Tenderness





- Neurological Exam


Neurological Exam: Alert, Awake





- Psychiatric Exam


Psychiatric exam: Alert, Awake





- Skin


Skin Exam: Dry, Intact, Normal Color, Warm





Assessment and Plan





- Assessment and Plan (Free Text)


Plan: 





(1) Acute pancreatitis


* Likely secondary to alcohol 


* Advancing diet--> FLD diet


* Imaging:


 * CT of Abdomen/Pelvis: Findings consistent with mild or early acute 

pancreatitis.  New low-density/cystic 1.3 cm mass in body of pancreas. 

Nonspecific.  Possible intrapancreatic pseudocyst from prior pancreatitis.  

Possible IPMN.  Follow-up advised. Fatty infiltration of the liver with sparing 

of the left lobe and mild atrophy of the left lobe. Mildly thickened bladder 

wall but there is poor distension of the urinary bladder.  Rule out cystitis. 

Nonobstructing tiny right renal calculus. Ill-defined opacity in right lower 

lobe.  Rule out pneumonia.  Circumferential mural thickening of distal 

esophagus.  Rule out neoplasm.  Consider evaluation with endoscopy when 

clinically feasible.


* Lipase 2310 --> 2761


* Amylase: 200


* Lipid panel: , Cholesterol 137, LDL 42, HDL 67


* Medications


 * Discontinue Morphine 1mg IV Q4H prn pain


 * Discontinue Zofran 4mg IVP Q8H prn N/V








(2) Venous congestion


* CXR 1/28/18 - moderate venous congestion. Left bibasilar airspace opacity. 

Small bilateral pleural effusions.


* Lasix 60mg IV STAT given


* Most recent ECHO 4/17: LVEF >70%


* HELD IVF


* Ordered duoneb once on 1/30/18 for wheezing.





(3) Bilateral airspace opacities


* Will cover for HAP


* CXR 1/28/18 - moderate venous congestion. Left bibasilar airspace opacity. 

Small bilateral pleural effusions.


* Vanco 500mg IV ONCE


* Continue Zosyn (renal dose) 2.25 gm IV Q6H 





(4) Elevated LFTs


* Likely secondary to alcohol 


* Resolved





(5) HTN (hypertension)


* Pt did not take medications on day of admission


* Propranolol 20mg po TID


* Lisinopril 20mg daily


* Monitor BP





(6) Diabetes


* Hold home medication of Glimepiride


* Start ISS with Accuchecks Q6H


* Monitor





(7) ETOH abuse


* Starting Librium 50mg Q8 prn- HOLD if sleeping or sedated 


 * Discontinued on 1/24/18 Ativan 1mg IV Q6h HENRY and PRN for alcohol withdrawal

- HOLD if sleeping or sedated.


* Thiamine PO daily 


* MV PO daily


* Folic Acid PO daily


* Monitor for withdrawal. Last drink yesterday 1/20/18


* Aspiration precautions


* Seizure precautions





(9) Constipation:


* Resolved


* Colace 100mg PO daily





(10) Anemia


* Hgb 7.1 on 1/23 (decreased from 9.9 at admission)


* Type and cross


* 2 units PRBC given on 1/23--> Hgb improved


* Stool occult: negative


* Iron studies: iron 47, TIBC 202, %sat 23, Ferritin 561


* GI consulted, Dr. Quintanilla; recs appreciated


 * Patient went for endoscopy on 1/26/18. Repeat endoscopy in 8 weeks for 

surveillance based on path results


 * Patient needs colonoscopy; postponed due to possible pneumonia


 * Results: candidiasis esophagitis, gastric ulcer (biopsied); erythematous 

mucosa in prepyloric region of stomach and erythematous duodenopathy


 * Outpatient f/u in 5 weeks


 * Started fluconazole 100mg PO daily for weeks; sucralfate suspension 1gm PO 

BID for 10 weeks; protonix 40mg PO daily for 10 weeks.


* Alpha fetoprotein- 2.9, nml


* CA 19-9: 149, elevated


* CEA: 8.7 H


* Continue to monitor H/H





(11) Hyponatremia


* Resolved


* Discontinued Hydration with half NS as above


* Continue to monitor labs





(12)  Bilirubinemia


* T. Geovanni: 3.0 trending down


* Direct:1.0





(13) Prophylactic measure


* Protonix 40mg IV daily


* Heparin SC - HOLD


* SCDs


* Aspiration precautions


* Swallow eval





Discussed with Dr. Fung.





Disposition: Patient is s/p colonscopy and endoscopy. Patient awaiting 

authorization from insurance for discharge to [facility]

## 2018-02-01 LAB
ALBUMIN SERPL-MCNC: 3 G/DL (ref 3.5–5)
ALBUMIN/GLOB SERPL: 0.9 {RATIO} (ref 1–2.1)
ALT SERPL-CCNC: 18 U/L (ref 21–72)
ANISOCYTOSIS BLD QL SMEAR: (no result)
AST SERPL-CCNC: 18 U/L (ref 17–59)
BASOPHILS # BLD AUTO: 0.1 K/UL (ref 0–0.2)
BASOPHILS NFR BLD: 0.7 % (ref 0–2)
BUN SERPL-MCNC: 22 MG/DL (ref 9–20)
CALCIUM SERPL-MCNC: 8.2 MG/DL (ref 8.6–10.4)
EOSINOPHIL # BLD AUTO: 0.2 K/UL (ref 0–0.7)
EOSINOPHIL NFR BLD: 1.6 % (ref 0–4)
ERYTHROCYTE [DISTWIDTH] IN BLOOD BY AUTOMATED COUNT: 22.1 % (ref 11.5–14.5)
GFR NON-AFRICAN AMERICAN: 36
HGB BLD-MCNC: 9.1 G/DL (ref 12–18)
LG PLATELETS BLD QL SMEAR: PRESENT
LIPASE: 75 U/L (ref 23–300)
LYMPHOCYTE: 9 % (ref 20–40)
LYMPHOCYTES # BLD AUTO: 0.8 K/UL (ref 1–4.3)
LYMPHOCYTES NFR BLD AUTO: 8 % (ref 20–40)
MCH RBC QN AUTO: 29.2 PG (ref 27–31)
MCHC RBC AUTO-ENTMCNC: 32.8 G/DL (ref 33–37)
MCV RBC AUTO: 89.1 FL (ref 80–94)
MONOCYTE: 2 % (ref 0–10)
MONOCYTES # BLD: 0.6 K/UL (ref 0–0.8)
MONOCYTES NFR BLD: 6.1 % (ref 0–10)
NEUTROPHILS # BLD: 8.4 K/UL (ref 1.8–7)
NEUTROPHILS NFR BLD AUTO: 83.6 % (ref 50–75)
NEUTROPHILS NFR BLD AUTO: 89 % (ref 50–75)
NRBC BLD AUTO-RTO: 0 % (ref 0–2)
PLATELET # BLD EST: NORMAL 10*3/UL
PLATELET # BLD: 213 K/UL (ref 130–400)
PMV BLD AUTO: 9.1 FL (ref 7.2–11.7)
RBC # BLD AUTO: 3.11 MIL/UL (ref 4.4–5.9)
TOTAL CELLS COUNTED BLD: 100
WBC # BLD AUTO: 10 K/UL (ref 4.8–10.8)

## 2018-02-01 RX ADMIN — HUMAN INSULIN SCH UNIT: 100 INJECTION, SOLUTION SUBCUTANEOUS at 21:46

## 2018-02-01 RX ADMIN — HUMAN INSULIN SCH UNIT: 100 INJECTION, SOLUTION SUBCUTANEOUS at 12:30

## 2018-02-01 RX ADMIN — TAZOBACTAM SODIUM AND PIPERACILLIN SODIUM SCH MLS/HR: 250; 2 INJECTION, SOLUTION INTRAVENOUS at 12:23

## 2018-02-01 RX ADMIN — HUMAN INSULIN SCH UNIT: 100 INJECTION, SOLUTION SUBCUTANEOUS at 18:13

## 2018-02-01 RX ADMIN — SUCRALFATE SCH GM: 1 SUSPENSION ORAL at 12:23

## 2018-02-01 RX ADMIN — TAZOBACTAM SODIUM AND PIPERACILLIN SODIUM SCH MLS/HR: 250; 2 INJECTION, SOLUTION INTRAVENOUS at 23:50

## 2018-02-01 RX ADMIN — TAZOBACTAM SODIUM AND PIPERACILLIN SODIUM SCH MLS/HR: 250; 2 INJECTION, SOLUTION INTRAVENOUS at 05:36

## 2018-02-01 RX ADMIN — Medication SCH ML: at 10:52

## 2018-02-01 RX ADMIN — HUMAN INSULIN SCH UNIT: 100 INJECTION, SOLUTION SUBCUTANEOUS at 08:35

## 2018-02-01 RX ADMIN — SUCRALFATE SCH GM: 1 SUSPENSION ORAL at 08:35

## 2018-02-01 RX ADMIN — SUCRALFATE SCH: 1 SUSPENSION ORAL at 19:00

## 2018-02-01 RX ADMIN — SUCRALFATE SCH GM: 1 SUSPENSION ORAL at 21:44

## 2018-02-01 RX ADMIN — TAZOBACTAM SODIUM AND PIPERACILLIN SODIUM SCH MLS/HR: 250; 2 INJECTION, SOLUTION INTRAVENOUS at 18:16

## 2018-02-01 RX ADMIN — POTASSIUM CHLORIDE SCH MEQ: 20 TABLET, EXTENDED RELEASE ORAL at 10:53

## 2018-02-01 NOTE — CP.PCM.PN
Subjective





- Date & Time of Evaluation


Date of Evaluation: 02/01/18


Time of Evaluation: 10:38





- Subjective


Subjective: 





Medicine progress note for Dr. Fung's service





Patient was seen and examined at bedside. Patient is alert, awake and smiling. 

Patient reports he has lower abdominal pain, but does not describe the pain. He 

also complains that he used to be able to walk but feels to weak now. As per 

nursing, patient has low urine output (50cc overnight), was bladder scanned 

which was 0cc. Nurse then repositioned darnell and urine output increased. 

Patient denies chest pain, shortness of breath, nausea, vomiting, fevers, 

headaches. 





Objective





- Vital Signs/Intake and Output


Vital Signs (last 24 hours): 


 











Temp Pulse Resp BP Pulse Ox


 


 97.9 F   67   20   147/85   99 


 


 02/01/18 08:00  02/01/18 08:00  02/01/18 08:00  02/01/18 08:00  02/01/18 08:00








Intake and Output: 


 











 02/01/18 02/01/18





 06:59 18:59


 


Intake Total 1375 


 


Output Total 150 


 


Balance 1225 














- Medications


Medications: 


 Current Medications





Chlordiazepoxide (Librium)  50 mg PO Q8 PRN


   PRN Reason: Agitation


Docusate Sodium (Colace)  100 mg PO DAILY [facility]


   Last Admin: 01/31/18 11:00 Dose:  Not Given


Fluconazole (Diflucan)  100 mg PO DAILY [facility]


   Last Admin: 01/31/18 10:57 Dose:  100 mg


Folic Acid (Folic Acid)  1 mg PO DAILY [facility]


   Last Admin: 01/30/18 09:50 Dose:  1 mg


Furosemide (Lasix)  40 mg PO DAILY [facility]


   Last Admin: 01/31/18 11:00 Dose:  40 mg


Heparin Sodium (Porcine) (Heparin)  5,000 units SC Q8 [facility]


   Last Admin: 01/25/18 06:18 Dose:  5,000 units


Hydralazine HCl (Apresoline)  10 mg IVP Q6H PRN


   PRN Reason: Systolic Blood Pressure


   Last Admin: 02/01/18 06:24 Dose:  10 mg


Sodium Chloride (Sodium Chloride 0.45%)  1,000 mls @ 75 mls/hr IV .[id number] [facility]


   Last Admin: 01/28/18 01:50 Dose:  75 mls/hr


Piperacillin Sod/Tazobactam Sod (Zosyn 2.25 Gm Iv Premix)  2.25 gm in 50 mls @ 

100 mls/hr IVPB Q6H [facility]


   Last Admin: 02/01/18 05:36 Dose:  100 mls/hr


Lactated Ringer's (Lactated Ringer's 500ml)  500 mls @ 125 mls/hr IV .Q4H [facility]


Insulin Human Regular (Novolin R)  0 unit SC ACHS [facility]


   PRN Reason: Protocol


   Last Admin: 02/01/18 08:35 Dose:  6 unit


Lisinopril (Zestril)  20 mg PO DAILY [facility]


   Last Admin: 01/31/18 10:58 Dose:  20 mg


Multivitamins/Vitamin C (Multi-Delyn Liquid)  5 ml PO DAILY [facility]


   Last Admin: 01/31/18 10:58 Dose:  5 ml


Pantoprazole Sodium (Protonix Inj)  40 mg IVP Q12H [facility]


   Last Admin: 02/01/18 05:36 Dose:  40 mg


Potassium Chloride (K-Dur 20 Meq Er Tab)  20 meq PO DAILY [facility]


   Last Admin: 01/31/18 11:00 Dose:  20 meq


Propranolol HCl (Inderal)  20 mg PO TID [facility]


   Last Admin: 01/31/18 19:00 Dose:  20 mg


Sucralfate (Carafate Oral Susp)  1 gm PO ACHS [facility]


   Last Admin: 02/01/18 08:35 Dose:  1 gm


Thiamine HCl (Vitamin B1 Tab)  100 mg PO DAILY [facility]


   Last Admin: 01/31/18 11:00 Dose:  100 mg











- Labs


Labs: 


 





 02/01/18 06:03 





 02/01/18 06:03 





 











PT  14.6 SECONDS (9.7-12.2)  H  01/27/18  11:40    


 


INR  1.3   01/27/18  11:40    


 


APTT  30 SECONDS (21-34)   01/27/18  11:40    














- Additional Findings


Additional findings: 





- Constitutional


Appears: No Acute Distress, pleasant





- Head Exam


Head Exam: ATRAUMATIC, NORMOCEPHALIC





- Eye Exam


Eye Exam: EOMI





- ENT Exam


ENT Exam: Mucous Membranes Moist





- Respiratory Exam


Respiratory Exam: NORMAL BREATHING PATTERN, decreased breath sounds.  absent: 

Rales, Rhonchi, Respiratory Distress





- Cardiovascular Exam


Cardiovascular Exam: REGULAR RHYTHM, +S1, +S2





- GI/Abdominal Exam


GI & Abdominal Exam: Soft, Normal Bowel Sounds, Tenderness (lower, midline).  

absent: Distended, Firm 





- Neurological Exam


Neurological Exam: Alert, Awake





- Psychiatric Exam


Psychiatric exam: Alert, Awake





- Skin


Skin Exam: Dry, Intact, Normal Color, Warm








Assessment and Plan





- Assessment and Plan (Free Text)


Plan: 





(1) Acute pancreatitis


* Likely secondary to alcohol 


* Advancing diet--> FLD diet


* Imaging:


 * CT of Abdomen/Pelvis: Findings consistent with mild or early acute 

pancreatitis.  New low-density/cystic 1.3 cm mass in body of pancreas. 

Nonspecific.  Possible intrapancreatic pseudocyst from prior pancreatitis.  

Possible IPMN.  Follow-up advised. Fatty infiltration of the liver with sparing 

of the left lobe and mild atrophy of the left lobe. Mildly thickened bladder 

wall but there is poor distension of the urinary bladder.  Rule out cystitis. 

Nonobstructing tiny right renal calculus. Ill-defined opacity in right lower 

lobe.  Rule out pneumonia.  Circumferential mural thickening of distal 

esophagus.  Rule out neoplasm.  Consider evaluation with endoscopy when 

clinically feasible.


* Lipase 2310 --> 2761--> 75 on 2/1


* Amylase: 200


* Lipid panel: , Cholesterol 137, LDL 42, HDL 67


* Medications


 * Discontinue Morphine 1mg IV Q4H prn pain


 * Discontinue Zofran 4mg IVP Q8H prn N/V








(2) Venous congestion


* CXR 1/28/18 - moderate venous congestion. Left bibasilar airspace opacity. 

Small bilateral pleural effusions.


* Lasix 60mg IV STAT given


* Most recent ECHO 4/17: LVEF >70%


* HELD IVF


* Ordered duoneb once on 1/30/18 for wheezing.





(3) Bilateral airspace opacities


* Will cover for HAP


* CXR 1/28/18 - moderate venous congestion. Left bibasilar airspace opacity. 

Small bilateral pleural effusions.


* Vanco 500mg IV ONCE


* Continue Zosyn (renal dose) 2.25 gm IV Q6H 





(4) Elevated LFTs


* Likely secondary to alcohol 


* Resolved





(5) HTN (hypertension)


* Pt did not take medications on day of admission


* Propranolol 20mg po TID


* Lisinopril 20mg daily


* Monitor BP





(6) Diabetes


* Hold home medication of Glimepiride


* Start ISS with Accuchecks Q6H


* Monitor





(7) ETOH abuse


* Starting Librium 50mg Q8 prn- HOLD if sleeping or sedated 


 * Discontinued on 1/24/18 Ativan 1mg IV Q6h HENRY and PRN for alcohol withdrawal

- HOLD if sleeping or sedated.


* Thiamine PO daily 


* MV PO daily


* Folic Acid PO daily


* Monitor for withdrawal. Last drink yesterday 1/20/18


* Aspiration precautions


* Seizure precautions





(9) Constipation:


* Resolved


* Colace 100mg PO daily





(10) Anemia


* Hgb 7.1 on 1/23 (decreased from 9.9 at admission)


* Type and cross


* 2 units PRBC given on 1/23--> Hgb improved


* Stool occult: negative


* Iron studies: iron 47, TIBC 202, %sat 23, Ferritin 561


* GI consulted, Dr. Quintanilla; recs appreciated


 * Patient went for endoscopy on 1/26/18. Repeat endoscopy in 8 weeks for 

surveillance based on path results


 * Patient needs colonoscopy; postponed due to possible pneumonia


 * Results: candidiasis esophagitis, gastric ulcer (biopsied); erythematous 

mucosa in prepyloric region of stomach and erythematous duodenopathy


 * Outpatient f/u in 5 weeks


 * Started fluconazole 100mg PO daily for weeks; sucralfate suspension 1gm PO 

BID for 10 weeks; protonix 40mg PO daily for 10 weeks.


* Alpha fetoprotein- 2.9, nml


* CA 19-9: 149, elevated


* CEA: 8.7 H


* Continue to monitor H/H





(11) Hyponatremia


* Resolved


* Discontinued Hydration with half NS as above


* Continue to monitor labs





(12)  Bilirubinemia


* Resolved, within normal range


* T. Geovanni: 3.0 trending down


* Direct:1.0





(13) Prophylactic measure


* Protonix 40mg IV daily


* Heparin SC - HOLD


* SCDs


* Aspiration precautions


* Swallow eval-passed





Discussed with Dr. Fung.





Disposition: Patient awaiting authorization from insurance for discharge to 

[facility]

## 2018-02-01 NOTE — PN
DATE:



LOCATION:  [facility], bed A.



SUBJECTIVE:  This is a 67-year-old male post upper and lower endoscopy,

seen and examined in-house without significant clinical changes, but with

very mild semi-confusion and disorientation with intermittent period of

poorly controlled hypertension.



The entire chart is reviewed including, but not limited to the most recent

lab and radiology study results, current and the previous medication list,

current and the previous medical events as well as the patient's mental

status discussed with the staff at length.



LABORATORY DATA:  Today's lab showed hemoglobin 9.1, hematocrit 27.7, but

no evidence of active bleeding with normal platelet count and normal white

blood cells with low CO2 content of 20, BUN 22, creatinine 1.9 with blood

glucose level 329 with low calcium 8.2.  Albumin is still low at 3.0.



PHYSICAL EXAMINATION:

GENERAL:  A 67-year-old male, semi-disoriented.

VITAL SIGNS:  Afebrile with pulse of 64, respiratory rate 20 to 22, blood

pressure at the latest of 144/82.

HEENT:  Showed mildly pale dry oral mucous membrane, nonicteric sclerae.

LUNGS:  Few scattered crepitation with decreased air entry at bases.

HEART:  Positive S1 and S2.

ABDOMEN:  Soft with slight distention, mild generalized tenderness.  No

mass or organomegaly.  No rebound tenderness.

RECTAL:  The patient refused.

EXTREMITIES:  With slight lower extremity with edematous changes.  No

clubbing or cyanosis.

NEUROLOGIC:  No reported new neurological deficits, sensory or motor.



IMPRESSION:

1.  Diverticulosis with left-sided diverticulitis.

2.  Peptic ulcer disease.

3.  Alcoholic liver disease with liver cirrhosis.

4.  Anemia secondary to above.

5.  Known history of acute pancreatitis, alcohol-induced by history.

6.  Poorly controlled hypertension, diabetes mellitus.

7.  Known history of status post appendectomy.

8.  Malnutrition with hypoalbuminemia.



SUGGESTIONS:

1.  Continue current management.

2.  Follow up on ammonia level.

3.  Further recommendations to follow.



Thank you for letting me to participate in your patient's case management.





__________________________________________

Neri Griffin MD



DD:  02/01/2018 10:38:02

DT:  02/01/2018 11:47:00

Job # [id number]

## 2018-02-02 LAB
ALBUMIN SERPL-MCNC: 2.6 G/DL (ref 3.5–5)
ALBUMIN/GLOB SERPL: 0.8 {RATIO} (ref 1–2.1)
ALT SERPL-CCNC: 24 U/L (ref 21–72)
ANISOCYTOSIS BLD QL SMEAR: (no result)
AST SERPL-CCNC: 16 U/L (ref 17–59)
BASOPHILS # BLD AUTO: 0.1 K/UL (ref 0–0.2)
BASOPHILS NFR BLD: 1 % (ref 0–2)
BUN SERPL-MCNC: 21 MG/DL (ref 9–20)
CALCIUM SERPL-MCNC: 8.3 MG/DL (ref 8.6–10.4)
EOSINOPHIL # BLD AUTO: 0.2 K/UL (ref 0–0.7)
EOSINOPHIL NFR BLD: 2 % (ref 0–4)
EOSINOPHIL NFR BLD: 2.8 % (ref 0–4)
ERYTHROCYTE [DISTWIDTH] IN BLOOD BY AUTOMATED COUNT: 21.4 % (ref 11.5–14.5)
GFR NON-AFRICAN AMERICAN: 33
HGB BLD-MCNC: 8.8 G/DL (ref 12–18)
LG PLATELETS BLD QL SMEAR: PRESENT
LYMPHOCYTE: 7 % (ref 20–40)
LYMPHOCYTES # BLD AUTO: 0.7 K/UL (ref 1–4.3)
LYMPHOCYTES NFR BLD AUTO: 8.9 % (ref 20–40)
MCH RBC QN AUTO: 29.8 PG (ref 27–31)
MCHC RBC AUTO-ENTMCNC: 33.5 G/DL (ref 33–37)
MCV RBC AUTO: 88.8 FL (ref 80–94)
MONOCYTE: 3 % (ref 0–10)
MONOCYTES # BLD: 0.4 K/UL (ref 0–0.8)
MONOCYTES NFR BLD: 5.9 % (ref 0–10)
NEUTROPHILS # BLD: 6 K/UL (ref 1.8–7)
NEUTROPHILS NFR BLD AUTO: 81.4 % (ref 50–75)
NEUTROPHILS NFR BLD AUTO: 86 % (ref 50–75)
NEUTS BAND NFR BLD: 2 % (ref 0–2)
NRBC BLD AUTO-RTO: 0 % (ref 0–2)
PLATELET # BLD EST: NORMAL 10*3/UL
PLATELET # BLD: 243 K/UL (ref 130–400)
PMV BLD AUTO: 9 FL (ref 7.2–11.7)
RBC # BLD AUTO: 2.97 MIL/UL (ref 4.4–5.9)
TOTAL CELLS COUNTED BLD: 100
WBC # BLD AUTO: 7.3 K/UL (ref 4.8–10.8)

## 2018-02-02 RX ADMIN — HUMAN INSULIN SCH UNIT: 100 INJECTION, SOLUTION SUBCUTANEOUS at 12:24

## 2018-02-02 RX ADMIN — SUCRALFATE SCH GM: 1 SUSPENSION ORAL at 08:03

## 2018-02-02 RX ADMIN — Medication SCH ML: at 09:30

## 2018-02-02 RX ADMIN — TAZOBACTAM SODIUM AND PIPERACILLIN SODIUM SCH MLS/HR: 250; 2 INJECTION, SOLUTION INTRAVENOUS at 05:30

## 2018-02-02 RX ADMIN — TAZOBACTAM SODIUM AND PIPERACILLIN SODIUM SCH MLS/HR: 250; 2 INJECTION, SOLUTION INTRAVENOUS at 12:24

## 2018-02-02 RX ADMIN — HUMAN INSULIN SCH UNIT: 100 INJECTION, SOLUTION SUBCUTANEOUS at 08:03

## 2018-02-02 RX ADMIN — SUCRALFATE SCH GM: 1 SUSPENSION ORAL at 21:51

## 2018-02-02 RX ADMIN — TAZOBACTAM SODIUM AND PIPERACILLIN SODIUM SCH MLS/HR: 250; 2 INJECTION, SOLUTION INTRAVENOUS at 23:18

## 2018-02-02 RX ADMIN — HUMAN INSULIN SCH: 100 INJECTION, SOLUTION SUBCUTANEOUS at 22:04

## 2018-02-02 RX ADMIN — TAZOBACTAM SODIUM AND PIPERACILLIN SODIUM SCH MLS/HR: 250; 2 INJECTION, SOLUTION INTRAVENOUS at 17:40

## 2018-02-02 RX ADMIN — POTASSIUM CHLORIDE SCH MEQ: 20 TABLET, EXTENDED RELEASE ORAL at 09:29

## 2018-02-02 RX ADMIN — HUMAN INSULIN SCH UNIT: 100 INJECTION, SOLUTION SUBCUTANEOUS at 17:15

## 2018-02-02 RX ADMIN — SUCRALFATE SCH GM: 1 SUSPENSION ORAL at 17:15

## 2018-02-02 RX ADMIN — SUCRALFATE SCH GM: 1 SUSPENSION ORAL at 12:24

## 2018-02-02 NOTE — CP.PCM.PN
Subjective





- Date & Time of Evaluation


Date of Evaluation: 02/02/18


Time of Evaluation: 06:59





- Subjective


Subjective: 





Medicine progress note for Dr. Fung's service





Patient was seen and examined at bedside. Patient is alert, awake and 

orientedx2 at this time, however, still confused at times. Patient states 

abdominal pain has resolved and he feels better. He is complaining of "eye 

problems" however, as per wife, patient was complaining of eye problems prior 

to hospitalization and has seen a doctor. Patient denies chest pain, shortness 

of breath, abdominal pain, nausea, vomiting, fevers, headaches. 





Objective





- Vital Signs/Intake and Output


Vital Signs (last 24 hours): 


 











Temp Pulse Resp BP Pulse Ox


 


 98.0 F   63   20   118/70   96 


 


 02/01/18 23:10  02/01/18 23:10  02/01/18 23:10  02/01/18 23:10  02/01/18 23:10








Intake and Output: 


 











 02/01/18 02/02/18





 18:59 06:59


 


Intake Total 1175 975


 


Output Total 1500 550


 


Balance -325 425














- Medications


Medications: 


 Current Medications





Chlordiazepoxide (Librium)  50 mg PO Q8 PRN


   PRN Reason: Agitation


Docusate Sodium (Colace)  100 mg PO DAILY [facility]


   Last Admin: 02/01/18 10:51 Dose:  100 mg


Fluconazole (Diflucan)  100 mg PO DAILY [facility]


   Last Admin: 02/01/18 10:53 Dose:  100 mg


Folic Acid (Folic Acid)  1 mg PO DAILY [facility]


   Last Admin: 02/01/18 10:54 Dose:  1 mg


Furosemide (Lasix)  40 mg PO DAILY [facility]


   Last Admin: 02/01/18 10:51 Dose:  40 mg


Heparin Sodium (Porcine) (Heparin)  5,000 units SC Q8 [facility]


   Last Admin: 01/25/18 06:18 Dose:  5,000 units


Hydralazine HCl (Apresoline)  10 mg IVP Q6H PRN


   PRN Reason: Systolic Blood Pressure


   Last Admin: 02/01/18 06:24 Dose:  10 mg


Sodium Chloride (Sodium Chloride 0.45%)  1,000 mls @ 75 mls/hr IV .[id number] [facility]


   Last Admin: 01/28/18 01:50 Dose:  75 mls/hr


Piperacillin Sod/Tazobactam Sod (Zosyn 2.25 Gm Iv Premix)  2.25 gm in 50 mls @ 

100 mls/hr IVPB Q6H [facility]


   Last Admin: 02/02/18 05:30 Dose:  100 mls/hr


Lactated Ringer's (Lactated Ringer's)  1,000 mls @ 125 mls/hr IV .Q8H [facility]


   Last Admin: 02/02/18 03:00 Dose:  Not Given


Insulin Human Regular (Novolin R)  [facility]


   PRN Reason: Protocol


   Last Admin: 02/01/18 21:46 Dose:  2 unit


Lisinopril (Zestril)  20 mg PO DAILY [facility]


   Last Admin: 02/01/18 10:51 Dose:  20 mg


Multivitamins/Vitamin C (Multi-Delyn Liquid)  5 ml PO DAILY [facility]


   Last Admin: 02/01/18 10:52 Dose:  5 ml


Pantoprazole Sodium (Protonix Inj)  40 mg IVP Q12H [facility]


   Last Admin: 02/02/18 06:52 Dose:  40 mg


Potassium Chloride (K-Dur 20 Meq Er Tab)  20 meq PO DAILY [facility]


   Last Admin: 02/01/18 10:53 Dose:  20 meq


Propranolol HCl (Inderal)  20 mg PO TID [facility]


   Last Admin: 02/01/18 19:16 Dose:  20 mg


Sucralfate (Carafate Oral Susp)  1 gm PO ACHS [facility]


   Last Admin: 02/01/18 21:44 Dose:  1 gm


Thiamine HCl (Vitamin B1 Tab)  100 mg PO DAILY [facility]


   Last Admin: 02/01/18 10:51 Dose:  100 mg











- Labs


Labs: 


 





 02/01/18 06:03 





 02/01/18 06:03 





 











PT  14.6 SECONDS (9.7-12.2)  H  01/27/18  11:40    


 


INR  1.3   01/27/18  11:40    


 


APTT  30 SECONDS (21-34)   01/27/18  11:40    














- Additional Findings


Additional findings: 





- Constitutional


Appears: No Acute Distress, pleasant





- Head Exam


Head Exam: ATRAUMATIC, NORMOCEPHALIC





- Eye Exam


Eye Exam: EOMI





- ENT Exam


ENT Exam: Mucous Membranes Moist





- Respiratory Exam


Respiratory Exam: NORMAL BREATHING PATTERN, decreased breath sounds.  absent: 

Rales, Rhonchi, Respiratory Distress





- Cardiovascular Exam


Cardiovascular Exam: REGULAR RHYTHM, +S1, +S2





- GI/Abdominal Exam


GI & Abdominal Exam: Soft, Normal Bowel Sounds.  absent: Distended, Firm, 

Tenderness





- Neurological Exam


Neurological Exam: Alert, Awake





- Psychiatric Exam


Psychiatric exam: Alert, Awake





- Skin


Skin Exam: Dry, Intact, Normal Color, Warm





Assessment and Plan





- Assessment and Plan (Free Text)


Plan: 





(1) Acute pancreatitis


* Likely secondary to alcohol 


* Advancing diet--> FLD diet


* Imaging:


 * CT of Abdomen/Pelvis: Findings consistent with mild or early acute 

pancreatitis.  New low-density/cystic 1.3 cm mass in body of pancreas. 

Nonspecific.  Possible intrapancreatic pseudocyst from prior pancreatitis.  

Possible IPMN.  Follow-up advised. Fatty infiltration of the liver with sparing 

of the left lobe and mild atrophy of the left lobe. Mildly thickened bladder 

wall but there is poor distension of the urinary bladder.  Rule out cystitis. 

Nonobstructing tiny right renal calculus. Ill-defined opacity in right lower 

lobe.  Rule out pneumonia.  Circumferential mural thickening of distal 

esophagus.  Rule out neoplasm.  Consider evaluation with endoscopy when 

clinically feasible.


* Lipase 2310 --> 2761--> 75 on 2/1


* Amylase: 200


* Lipid panel: , Cholesterol 137, LDL 42, HDL 67


* Medications


 * Discontinue Morphine 1mg IV Q4H prn pain


 * Discontinue Zofran 4mg IVP Q8H prn N/V








(2) Venous congestion


* CXR 1/28/18 - moderate venous congestion. Left bibasilar airspace opacity. 

Small bilateral pleural effusions.


* Lasix 60mg IV STAT given


* Most recent ECHO 4/17: LVEF >70%


* HELD IVF


* Ordered duoneb once on 1/30/18 for wheezing.





(3) Bilateral airspace opacities


* Will cover for HAP


* CXR 1/28/18 - moderate venous congestion. Left bibasilar airspace opacity. 

Small bilateral pleural effusions.


* Vanco 500mg IV ONCE


* Continue Zosyn (renal dose) 2.25 gm IV Q6H 





(4) Elevated LFTs


* Likely secondary to alcohol 


* Resolved





(5) HTN (hypertension)


* Pt did not take medications on day of admission


* Propranolol 20mg po TID


* Lisinopril 40mg daily (increased from 20mg daily on 2/2/18)


* Monitor BP





(6) Diabetes


* Hold home medication of Glimepiride


* Start ISS with Accuchecks Q6H


* Monitor





(7) ETOH abuse


* Starting Librium 50mg Q8 prn- HOLD if sleeping or sedated 


 * Discontinued on 1/24/18 Ativan 1mg IV Q6h HENRY and PRN for alcohol withdrawal

- HOLD if sleeping or sedated.


* Thiamine PO daily 


* MV PO daily


* Folic Acid PO daily


* Monitor for withdrawal. Last drink yesterday 1/20/18


* Aspiration precautions


* Seizure precautions





(9) Constipation:


* Resolved


* Colace 100mg PO daily





(10) Anemia


* Hgb 7.1 on 1/23 (decreased from 9.9 at admission)


* Type and cross


* 2 units PRBC given on 1/23--> Hgb improved


* Stool occult: negative


* Iron studies: iron 47, TIBC 202, %sat 23, Ferritin 561


* GI consulted, Dr. Quintanilla; recs appreciated


 * Patient went for endoscopy on 1/26/18. Repeat endoscopy in 8 weeks for 

surveillance based on path results


 * Patient needs colonoscopy; postponed due to possible pneumonia


 * Results: candidiasis esophagitis, gastric ulcer (biopsied); erythematous 

mucosa in prepyloric region of stomach and erythematous duodenopathy


 * Outpatient f/u in 5 weeks


 * Started fluconazole 100mg PO daily for weeks; sucralfate suspension 1gm PO 

BID for 10 weeks; protonix 40mg PO daily for 10 weeks.


* Alpha fetoprotein- 2.9, nml


* CA 19-9: 149, elevated


* CEA: 8.7 H


* Continue to monitor H/H





(11) Hyponatremia


* Resolved


* Discontinued Hydration with half NS as above


* Continue to monitor labs





(12)  Bilirubinemia


* Resolved, within normal range


* T. Geovanni: 3.0 trending down


* Direct:1.0





(13) Prophylactic measure


* Protonix 40mg IV daily


* Heparin SC - HOLD


* SCDs


* Aspiration precautions


* Swallow eval-passed


* OOB, PT/OT


* Artificial tears





Discussed with Dr. Fung.





Disposition: Patient awaiting authorization from insurance for discharge to 

[facility]

## 2018-02-02 NOTE — PN
LOCATION:  [facility], bed A.



SUBJECTIVE:  This is a 67-year-old male, seen and examined in rounds again

with period of semi-confusion and disorientation with reported urinary

retention for which urinary catheter balloon deflated, catheter was

inserted.  No reported active bleeding but poor oral intake.  The entire

chart is reviewed including but not limited to the most recent lab and

radiology study results, current and the previous medication list, current

and the previous medical events.  Case was discussed at length with the

staff.  It has to be mentioned that no reported chest pain, palpitation,

active bleeding, or significant shortness of breath.  No chills or fever.



LABORATORY DATA:  Today's lab showed hemoglobin of 8.8 with drop of

hematocrit to 26.4 but normal platelet count with low potassium 3.3, BUN

21, creatinine 2.0, blood glucose level is still elevated to 397 with low

calcium at 0.3, low albumin 2.6 and low total protein 5.8.



PHYSICAL EXAMINATION:

GENERAL:  A 67-year-old male, semi-confused with period of mild agitation.

VITAL SIGNS:  Afebrile with pulse of 66, respiratory rate 20 to 22, blood

pressure of 170/68.

HEENT:  Showed pale dry oral mucous membrane, nonicteric sclerae.

LUNGS:  A few scattered crepitation with decreased air entry at bases.

HEART:  Positive S1 and S2.

ABDOMEN:  With mild distention with generalized mild tenderness.  No mass

or organomegaly.  No rebound tenderness or guarding.

EXTREMITIES:  Lower extremities, mild edematous changes.  No clubbing or

cyanosis.

NEUROLOGIC:  No reported new neurological deficits, sensory or motor and no

focal deficits.



IMPRESSION:

1.  Alcoholism with alcoholic liver disease.

2.  Alcohol-induced acute pancreatitis.

3.  Bilateral pneumonia.

4.  Known history of hypertension, poorly controlled diabetes mellitus.

5.  Peptic ulcer disease.

6.  Anemia secondary to above.

7.  Electrolyte imbalance with hyponatremia, hypokalemia.

8.  Diverticulosis with left-sided diverticulitis by recent history and

colitis.

9.  Malnutrition with hypoalbuminemia.



SUGGESTIONS:

1.  Continue current management.

2.  Psychiatric evaluation _____.

3.  Follow up ammonia level.

4.  Further recommendation to follow.







__________________________________________

Neri Griffin MD



DD:  02/02/2018 10:51:22

DT:  02/02/2018 12:57:30

[facility] # [id number]

## 2018-02-03 VITALS — RESPIRATION RATE: 20 BRPM

## 2018-02-03 LAB
ALBUMIN SERPL-MCNC: 2.7 G/DL (ref 3.5–5)
ALBUMIN/GLOB SERPL: 0.9 {RATIO} (ref 1–2.1)
ALT SERPL-CCNC: 16 U/L (ref 21–72)
AST SERPL-CCNC: 15 U/L (ref 17–59)
BASOPHILS # BLD AUTO: 0.1 K/UL (ref 0–0.2)
BASOPHILS NFR BLD: 1.4 % (ref 0–2)
BUN SERPL-MCNC: 18 MG/DL (ref 9–20)
CALCIUM SERPL-MCNC: 8.1 MG/DL (ref 8.6–10.4)
EOSINOPHIL # BLD AUTO: 0.1 K/UL (ref 0–0.7)
EOSINOPHIL NFR BLD: 2.1 % (ref 0–4)
ERYTHROCYTE [DISTWIDTH] IN BLOOD BY AUTOMATED COUNT: 21.5 % (ref 11.5–14.5)
GFR NON-AFRICAN AMERICAN: 43
HGB BLD-MCNC: 9 G/DL (ref 12–18)
LYMPHOCYTES # BLD AUTO: 0.8 K/UL (ref 1–4.3)
LYMPHOCYTES NFR BLD AUTO: 12.2 % (ref 20–40)
MCH RBC QN AUTO: 28.9 PG (ref 27–31)
MCHC RBC AUTO-ENTMCNC: 32.5 G/DL (ref 33–37)
MCV RBC AUTO: 88.9 FL (ref 80–94)
MONOCYTES # BLD: 0.4 K/UL (ref 0–0.8)
MONOCYTES NFR BLD: 5.6 % (ref 0–10)
NEUTROPHILS # BLD: 5.2 K/UL (ref 1.8–7)
NEUTROPHILS NFR BLD AUTO: 78.7 % (ref 50–75)
NRBC BLD AUTO-RTO: 0 % (ref 0–2)
PLATELET # BLD: 255 K/UL (ref 130–400)
PMV BLD AUTO: 9.1 FL (ref 7.2–11.7)
RBC # BLD AUTO: 3.11 MIL/UL (ref 4.4–5.9)
WBC # BLD AUTO: 6.6 K/UL (ref 4.8–10.8)

## 2018-02-03 RX ADMIN — TAZOBACTAM SODIUM AND PIPERACILLIN SODIUM SCH MLS/HR: 250; 2 INJECTION, SOLUTION INTRAVENOUS at 22:54

## 2018-02-03 RX ADMIN — HUMAN INSULIN SCH UNIT: 100 INJECTION, SOLUTION SUBCUTANEOUS at 17:15

## 2018-02-03 RX ADMIN — POTASSIUM CHLORIDE SCH MEQ: 20 TABLET, EXTENDED RELEASE ORAL at 11:30

## 2018-02-03 RX ADMIN — SUCRALFATE SCH GM: 1 SUSPENSION ORAL at 22:17

## 2018-02-03 RX ADMIN — SUCRALFATE SCH GM: 1 SUSPENSION ORAL at 12:39

## 2018-02-03 RX ADMIN — HUMAN INSULIN SCH UNIT: 100 INJECTION, SOLUTION SUBCUTANEOUS at 09:14

## 2018-02-03 RX ADMIN — POLYVINYL ALCOHOL SCH DROP: 14 SOLUTION/ DROPS OPHTHALMIC at 11:20

## 2018-02-03 RX ADMIN — SUCRALFATE SCH GM: 1 SUSPENSION ORAL at 17:00

## 2018-02-03 RX ADMIN — HUMAN INSULIN SCH UNIT: 100 INJECTION, SOLUTION SUBCUTANEOUS at 13:05

## 2018-02-03 RX ADMIN — TAZOBACTAM SODIUM AND PIPERACILLIN SODIUM SCH MLS/HR: 250; 2 INJECTION, SOLUTION INTRAVENOUS at 04:44

## 2018-02-03 RX ADMIN — TAZOBACTAM SODIUM AND PIPERACILLIN SODIUM SCH MLS/HR: 250; 2 INJECTION, SOLUTION INTRAVENOUS at 18:15

## 2018-02-03 RX ADMIN — Medication SCH ML: at 11:27

## 2018-02-03 RX ADMIN — TAZOBACTAM SODIUM AND PIPERACILLIN SODIUM SCH MLS/HR: 250; 2 INJECTION, SOLUTION INTRAVENOUS at 12:29

## 2018-02-03 RX ADMIN — SUCRALFATE SCH GM: 1 SUSPENSION ORAL at 09:12

## 2018-02-03 RX ADMIN — HUMAN INSULIN SCH: 100 INJECTION, SOLUTION SUBCUTANEOUS at 21:38

## 2018-02-03 NOTE — CP.PCM.PN
Subjective





- Date & Time of Evaluation


Date of Evaluation: 02/03/18


Time of Evaluation: 15:33





- Subjective


Subjective: 





Medicine progress note for Dr. Fung's service





Patient was seen and examined at bedside. Patient is alert, awake and confused 

at this time. Patient states he feels better, however still complaining about 

his eye sight (patient discusses he made an appointment with his eye doctor). 

Patient denies chest pain, shortness of breath, abdominal pain, nausea, vomiting

, fevers, headaches. 








Objective





- Vital Signs/Intake and Output


Vital Signs (last 24 hours): 


 











Temp Pulse Resp BP Pulse Ox


 


 97.7 F   68   18   167/89 H  98 


 


 02/03/18 08:10  02/03/18 08:10  02/03/18 08:10  02/03/18 11:13  02/03/18 08:10








Intake and Output: 


 











 02/03/18 02/03/18





 06:59 18:59


 


Intake Total 2120 


 


Output Total 2000 


 


Balance 120 














- Medications


Medications: 


 Current Medications





Artificial Tears (Artificial Tears)  0 ml OD DAILY [facility]


   Last Admin: 02/03/18 11:20 Dose:  1 drop


Chlordiazepoxide (Librium)  50 mg PO Q8 PRN


   PRN Reason: Agitation


   Last Admin: 02/03/18 01:16 Dose:  50 mg


Docusate Sodium (Colace)  100 mg PO DAILY [facility]


   Last Admin: 02/03/18 11:31 Dose:  Not Given


Fluconazole (Diflucan)  100 mg PO DAILY [facility]


   Last Admin: 02/03/18 12:25 Dose:  100 mg


Folic Acid (Folic Acid)  1 mg PO DAILY [facility]


   Last Admin: 02/03/18 11:15 Dose:  1 mg


Furosemide (Lasix)  40 mg PO DAILY [facility]


   Last Admin: 02/03/18 11:13 Dose:  40 mg


Heparin Sodium (Porcine) (Heparin)  5,000 units SC Q8 [facility]


   Last Admin: 01/25/18 06:18 Dose:  5,000 units


Hydralazine HCl (Apresoline)  10 mg IVP Q6H PRN


   PRN Reason: Systolic Blood Pressure


   Last Admin: 02/03/18 04:44 Dose:  10 mg


Sodium Chloride (Sodium Chloride 0.45%)  1,000 mls @ 75 mls/hr IV .[id number] [facility]


   Last Admin: 01/28/18 01:50 Dose:  75 mls/hr


Piperacillin Sod/Tazobactam Sod (Zosyn 2.25 Gm Iv Premix)  2.25 gm in 50 mls @ 

100 mls/hr IVPB Q6H [facility]


   Last Admin: 02/03/18 12:29 Dose:  100 mls/hr


Lactated Ringer's (Lactated Ringer's)  1,000 mls @ 125 mls/hr IV .Q8H [facility]


   Last Admin: 02/03/18 10:30 Dose:  Not Given


Insulin Human Regular (Novolin R)  0 unit SC [facility]


   PRN Reason: Protocol


   Last Admin: 02/03/18 13:05 Dose:  10 unit


Lisinopril (Zestril)  40 mg PO DAILY [facility]


   Last Admin: 02/03/18 11:15 Dose:  40 mg


Multivitamins/Vitamin C (Multi-Delyn Liquid)  5 ml PO DAILY [facility]


   Last Admin: 02/03/18 11:27 Dose:  5 ml


Pantoprazole Sodium (Protonix Inj)  40 mg IVP Q12H [facility]


   Last Admin: 02/03/18 06:21 Dose:  40 mg


Potassium Chloride (K-Dur 20 Meq Er Tab)  20 meq PO DAILY [facility]


   Last Admin: 02/03/18 11:30 Dose:  20 meq


Propranolol HCl (Inderal)  20 mg PO TID [facility]


   Last Admin: 02/03/18 14:28 Dose:  20 mg


Sucralfate (Carafate Oral Susp)  [facility]


   Last Admin: 02/03/18 12:39 Dose:  1 gm


Thiamine HCl (Vitamin B1 Tab)  100 mg PO DAILY [facility]


   Last Admin: 02/03/18 11:30 Dose:  100 mg











- Labs


Labs: 


 





 02/03/18 09:20 





 02/03/18 09:20 





 











PT  14.6 SECONDS (9.7-12.2)  H  01/27/18  11:40    


 


INR  1.3   01/27/18  11:40    


 


APTT  30 SECONDS (21-34)   01/27/18  11:40    














- Additional Findings


Additional findings: 





- Constitutional


Appears: No Acute Distress, pleasant





- Head Exam


Head Exam: ATRAUMATIC, NORMOCEPHALIC





- Eye Exam


Eye Exam: EOMI





- ENT Exam


ENT Exam: Mucous Membranes Moist





- Respiratory Exam


Respiratory Exam: NORMAL BREATHING PATTERN, decreased breath sounds.  absent: 

Rales, Rhonchi, Respiratory Distress





- Cardiovascular Exam


Cardiovascular Exam: REGULAR RHYTHM, +S1, +S2





- GI/Abdominal Exam


GI & Abdominal Exam: Soft, Normal Bowel Sounds.  absent: Distended, Firm, 

Tenderness





- Neurological Exam


Neurological Exam: Alert, Awake





- Psychiatric Exam


Psychiatric exam: Alert, Awake





- Skin


Skin Exam: Dry, Intact, Normal Color, Warm





Assessment and Plan





- Assessment and Plan (Free Text)


Plan: 





(1) Acute pancreatitis


* Likely secondary to alcohol 


* Advancing diet--> FLD diet


* Imaging:


 * CT of Abdomen/Pelvis: Findings consistent with mild or early acute 

pancreatitis.  New low-density/cystic 1.3 cm mass in body of pancreas. 

Nonspecific.  Possible intrapancreatic pseudocyst from prior pancreatitis.  

Possible IPMN.  Follow-up advised. Fatty infiltration of the liver with sparing 

of the left lobe and mild atrophy of the left lobe. Mildly thickened bladder 

wall but there is poor distension of the urinary bladder.  Rule out cystitis. 

Nonobstructing tiny right renal calculus. Ill-defined opacity in right lower 

lobe.  Rule out pneumonia.  Circumferential mural thickening of distal 

esophagus.  Rule out neoplasm.  Consider evaluation with endoscopy when 

clinically feasible.


* Lipase 2310 --> 2761--> 75 on 2/1


* Amylase: 200


* Lipid panel: , Cholesterol 137, LDL 42, HDL 67


* Medications


 * Discontinue Morphine 1mg IV Q4H prn pain


 * Discontinue Zofran 4mg IVP Q8H prn N/V





(2) Venous congestion


* CXR 1/28/18 - moderate venous congestion. Left bibasilar airspace opacity. 

Small bilateral pleural effusions.


* Lasix 60mg IV STAT given


* Most recent ECHO 4/17: LVEF >70%


* HELD IVF


* Ordered duoneb once on 1/30/18 for wheezing.





(3) Bilateral airspace opacities


* Will cover for HAP


* CXR 1/28/18 - moderate venous congestion. Left bibasilar airspace opacity. 

Small bilateral pleural effusions.


* Vanco 500mg IV ONCE


* Continue Zosyn (renal dose) 2.25 gm IV Q6H 





(4) Elevated LFTs


* Likely secondary to alcohol 


* Resolved





(5) HTN (hypertension)


* Pt did not take medications on day of admission


* Propranolol 20mg po TID


* Lisinopril 40mg daily (increased from 20mg daily on 2/2/18)


* Monitor BP





(6) Diabetes


* Hold home medication of Glimepiride


* Start ISS with Accuchecks Q6H


* Monitor





(7) ETOH abuse


* Starting Librium 50mg Q8 prn- HOLD if sleeping or sedated 


 * Discontinued on 1/24/18 Ativan 1mg IV Q6h HENRY and PRN for alcohol withdrawal

- HOLD if sleeping or sedated.


* Thiamine PO daily 


* MV PO daily


* Folic Acid PO daily


* Monitor for withdrawal. Last drink yesterday 1/20/18


* Aspiration precautions


* Seizure precautions





(9) Constipation:


* Resolved


* Colace 100mg PO daily





(10) Anemia


* Hgb 7.1 on 1/23 (decreased from 9.9 at admission)


* Type and cross


* 2 units PRBC given on 1/23--> Hgb improved


* Stool occult: negative


* Iron studies: iron 47, TIBC 202, %sat 23, Ferritin 561


* GI consulted, Dr. Quintanilla; recs appreciated


 * Patient went for endoscopy on 1/26/18. Repeat endoscopy in 8 weeks for 

surveillance based on path results


 * Patient needs colonoscopy; postponed due to possible pneumonia


 * Results: candidiasis esophagitis, gastric ulcer (biopsied); erythematous 

mucosa in prepyloric region of stomach and erythematous duodenopathy


 * Outpatient f/u in 5 weeks


 * Started fluconazole 100mg PO daily for 10 days (last dose on 2/4/18); 

sucralfate suspension 1gm PO BID for 10 weeks; protonix 40mg PO daily for 10 

weeks.


* Alpha fetoprotein- 2.9, nml


* CA 19-9: 149, elevated


* CEA: 8.7 H


* Continue to monitor H/H





(11) Hyponatremia


* Resolved


* Discontinued Hydration with half NS as above


* Continue to monitor labs





(12)  Bilirubinemia


* Resolved, within normal range


* T. Geovanni: 3.0 trending down


* Direct:1.0





(13) Prophylactic measure


* Protonix 40mg IV daily


* Heparin SC - HOLD


* SCDs


* Aspiration precautions


* Swallow eval-passed


* OOB, PT/OT


* Artificial tears





Discussed with Dr. Fung.





Disposition: Patient awaiting authorization from insurance for discharge to 

[facility]

## 2018-02-04 LAB
ALBUMIN SERPL-MCNC: 2.6 G/DL (ref 3.5–5)
ALBUMIN/GLOB SERPL: 0.8 {RATIO} (ref 1–2.1)
ALT SERPL-CCNC: 26 U/L (ref 21–72)
AST SERPL-CCNC: 20 U/L (ref 17–59)
BASOPHILS # BLD AUTO: 0.1 K/UL (ref 0–0.2)
BASOPHILS NFR BLD: 1.6 % (ref 0–2)
BUN SERPL-MCNC: 15 MG/DL (ref 9–20)
CALCIUM SERPL-MCNC: 8.4 MG/DL (ref 8.6–10.4)
EOSINOPHIL # BLD AUTO: 0.2 K/UL (ref 0–0.7)
EOSINOPHIL NFR BLD: 3.6 % (ref 0–4)
ERYTHROCYTE [DISTWIDTH] IN BLOOD BY AUTOMATED COUNT: 21.5 % (ref 11.5–14.5)
GFR NON-AFRICAN AMERICAN: 47
HGB BLD-MCNC: 9.1 G/DL (ref 12–18)
LYMPHOCYTES # BLD AUTO: 0.9 K/UL (ref 1–4.3)
LYMPHOCYTES NFR BLD AUTO: 18.7 % (ref 20–40)
MCH RBC QN AUTO: 29.1 PG (ref 27–31)
MCHC RBC AUTO-ENTMCNC: 33.1 G/DL (ref 33–37)
MCV RBC AUTO: 87.8 FL (ref 80–94)
MONOCYTES # BLD: 0.3 K/UL (ref 0–0.8)
MONOCYTES NFR BLD: 6.9 % (ref 0–10)
NEUTROPHILS # BLD: 3.4 K/UL (ref 1.8–7)
NEUTROPHILS NFR BLD AUTO: 69.2 % (ref 50–75)
NRBC BLD AUTO-RTO: 0.1 % (ref 0–2)
PLATELET # BLD: 281 K/UL (ref 130–400)
PMV BLD AUTO: 9 FL (ref 7.2–11.7)
RBC # BLD AUTO: 3.13 MIL/UL (ref 4.4–5.9)
WBC # BLD AUTO: 4.9 K/UL (ref 4.8–10.8)

## 2018-02-04 RX ADMIN — HUMAN INSULIN SCH UNIT: 100 INJECTION, SOLUTION SUBCUTANEOUS at 12:06

## 2018-02-04 RX ADMIN — Medication SCH ML: at 09:24

## 2018-02-04 RX ADMIN — SUCRALFATE SCH GM: 1 SUSPENSION ORAL at 17:14

## 2018-02-04 RX ADMIN — POLYVINYL ALCOHOL SCH DROP: 14 SOLUTION/ DROPS OPHTHALMIC at 09:24

## 2018-02-04 RX ADMIN — TAZOBACTAM SODIUM AND PIPERACILLIN SODIUM SCH MLS/HR: 250; 2 INJECTION, SOLUTION INTRAVENOUS at 12:06

## 2018-02-04 RX ADMIN — SUCRALFATE SCH GM: 1 SUSPENSION ORAL at 08:16

## 2018-02-04 RX ADMIN — TAZOBACTAM SODIUM AND PIPERACILLIN SODIUM SCH MLS/HR: 250; 2 INJECTION, SOLUTION INTRAVENOUS at 05:32

## 2018-02-04 RX ADMIN — TAZOBACTAM SODIUM AND PIPERACILLIN SODIUM SCH MLS/HR: 250; 2 INJECTION, SOLUTION INTRAVENOUS at 23:02

## 2018-02-04 RX ADMIN — SUCRALFATE SCH GM: 1 SUSPENSION ORAL at 12:06

## 2018-02-04 RX ADMIN — INSULIN DETEMIR SCH UNIT: 100 INJECTION, SOLUTION SUBCUTANEOUS at 21:49

## 2018-02-04 RX ADMIN — POTASSIUM CHLORIDE SCH MEQ: 20 TABLET, EXTENDED RELEASE ORAL at 09:23

## 2018-02-04 RX ADMIN — HUMAN INSULIN SCH: 100 INJECTION, SOLUTION SUBCUTANEOUS at 23:00

## 2018-02-04 RX ADMIN — TAZOBACTAM SODIUM AND PIPERACILLIN SODIUM SCH MLS/HR: 250; 2 INJECTION, SOLUTION INTRAVENOUS at 17:15

## 2018-02-04 RX ADMIN — HUMAN INSULIN SCH: 100 INJECTION, SOLUTION SUBCUTANEOUS at 17:15

## 2018-02-04 RX ADMIN — HUMAN INSULIN SCH UNIT: 100 INJECTION, SOLUTION SUBCUTANEOUS at 08:16

## 2018-02-04 RX ADMIN — SUCRALFATE SCH GM: 1 SUSPENSION ORAL at 21:48

## 2018-02-04 NOTE — CP.PCM.PN
Subjective





- Date & Time of Evaluation


Date of Evaluation: 02/04/18


Time of Evaluation: 08:40





- Subjective


Subjective: 





Medicine progress note for Dr. Fung's service





Patient was seen and examined at bedside. Patient is alert, awake and confused 

at this time. Patient believes he is home. Patient still complains of his eye 

and asks for glasses. Patient denies chest pain, shortness of breath, abdominal 

pain, nausea, vomiting, fevers, headaches. 











Objective





- Vital Signs/Intake and Output


Vital Signs (last 24 hours): 


 











Temp Pulse Resp BP Pulse Ox


 


 97.8 F   67   20   194/94 H  98 


 


 02/04/18 08:37  02/04/18 08:37  02/04/18 08:37  02/04/18 08:37  02/04/18 08:37








Intake and Output: 


 











 02/04/18 02/04/18





 06:59 18:59


 


Intake Total 1110 


 


Output Total 1375 


 


Balance -265 














- Medications


Medications: 


 Current Medications





Artificial Tears (Artificial Tears)  0 ml OD DAILY [facility]


   Last Admin: 02/03/18 11:20 Dose:  1 drop


Chlordiazepoxide (Librium)  50 mg PO Q8 PRN


   PRN Reason: Agitation


   Last Admin: 02/03/18 01:16 Dose:  50 mg


Docusate Sodium (Colace)  100 mg PO DAILY [facility]


   Last Admin: 02/03/18 11:31 Dose:  Not Given


Fluconazole (Diflucan)  100 mg PO DAILY [facility]


   Stop: 02/04/18 11:00


   Last Admin: 02/03/18 12:25 Dose:  100 mg


Folic Acid (Folic Acid)  1 mg PO DAILY [facility]


   Last Admin: 02/03/18 11:15 Dose:  1 mg


Furosemide (Lasix)  40 mg PO DAILY [facility]


   Last Admin: 02/03/18 11:13 Dose:  40 mg


Heparin Sodium (Porcine) (Heparin)  5,000 units SC Q8 [facility]


   Last Admin: 01/25/18 06:18 Dose:  5,000 units


Hydralazine HCl (Apresoline)  10 mg IVP Q6H PRN


   PRN Reason: Systolic Blood Pressure


   Last Admin: 02/03/18 04:44 Dose:  10 mg


Sodium Chloride (Sodium Chloride 0.45%)  1,000 mls @ 75 mls/hr IV .[id number] [facility]


   Last Admin: 01/28/18 01:50 Dose:  75 mls/hr


Piperacillin Sod/Tazobactam Sod (Zosyn 2.25 Gm Iv Premix)  2.25 gm in 50 mls @ 

100 mls/hr IVPB Q6H [facility]


   Last Admin: 02/04/18 05:32 Dose:  100 mls/hr


Lactated Ringer's (Lactated Ringer's)  1,000 mls @ 125 mls/hr IV .Q8H [facility]


   Last Admin: 02/04/18 05:20 Dose:  125 mls/hr


Insulin Human Regular (Novolin R)  0 unit SC [facility]


   PRN Reason: Protocol


   Last Admin: 02/04/18 08:16 Dose:  6 unit


Lisinopril (Zestril)  40 mg PO DAILY [facility]


   Last Admin: 02/04/18 08:35 Dose:  40 mg


Multivitamins/Vitamin C (Multi-Delyn Liquid)  5 ml PO DAILY [facility]


   Last Admin: 02/03/18 11:27 Dose:  5 ml


Pantoprazole Sodium (Protonix Inj)  40 mg IVP Q12H [facility]


   Last Admin: 02/04/18 06:01 Dose:  40 mg


Potassium Chloride (K-Dur 20 Meq Er Tab)  20 meq PO DAILY [facility]


   Last Admin: 02/03/18 11:30 Dose:  20 meq


Propranolol HCl (Inderal)  20 mg PO TID [facility]


   Last Admin: 02/03/18 18:50 Dose:  20 mg


S[facility] ([facility] Susp)  [facility]


   Last Admin: 02/04/18 08:16 Dose:  1 gm


Thiamine HCl (Vitamin B1 Tab)  100 mg PO DAILY [facility]


   Last Admin: 02/03/18 11:30 Dose:  100 mg











- Labs


Labs: 


 





 02/03/18 09:20 





 02/03/18 09:20 





 











PT  14.6 SECONDS (9.7-12.2)  H  01/27/18  11:40    


 


INR  1.3   01/27/18  11:40    


 


APTT  30 SECONDS (21-34)   01/27/18  11:40    














- Additional Findings


Additional findings: 





- Constitutional


Appears: No Acute Distress, pleasant





- Head Exam


Head Exam: ATRAUMATIC, NORMOCEPHALIC





- Eye Exam


Eye Exam: EOMI





- ENT Exam


ENT Exam: Mucous Membranes Moist





- Respiratory Exam


Respiratory Exam: NORMAL BREATHING PATTERN, decreased breath sounds.  absent: 

Rales, Rhonchi, Respiratory Distress





- Cardiovascular Exam


Cardiovascular Exam: REGULAR RHYTHM, +S1, +S2





- GI/Abdominal Exam


GI & Abdominal Exam: Soft, Normal Bowel Sounds.  absent: Distended, Firm, 

Tenderness





- Neurological Exam


Neurological Exam: Alert, Awake





- Psychiatric Exam


Psychiatric exam: Alert, Awake





- Skin


Skin Exam: Dry, Intact, Normal Color, Warm





Assessment and Plan





- Assessment and Plan (Free Text)


Plan: 





(1) Acute pancreatitis


* Likely secondary to alcohol 


* Resolved


* Imaging:


 * CT of Abdomen/Pelvis: Findings consistent with mild or early acute 

pancreatitis.  New low-density/cystic 1.3 cm mass in body of pancreas. 

Nonspecific.  Possible intrapancreatic pseudocyst from prior pancreatitis.  

Possible IPMN.  Follow-up advised. Fatty infiltration of the liver with sparing 

of the left lobe and mild atrophy of the left lobe. Mildly thickened bladder 

wall but there is poor distension of the urinary bladder.  Rule out cystitis. 

Nonobstructing tiny right renal calculus. Ill-defined opacity in right lower 

lobe.  Rule out pneumonia.  Circumferential mural thickening of distal 

esophagus.  Rule out neoplasm.  Consider evaluation with endoscopy when 

clinically feasible.


* Lipase 2310 --> 2761--> 75 on 2/1


* Amylase: 200


* Lipid panel: , Cholesterol 137, LDL 42, HDL 67


* Medications


 * Discontinue Morphine 1mg IV Q4H prn pain


 * Discontinue Zofran 4mg IVP Q8H prn N/V





(2) Venous congestion


* CXR 1/28/18 - moderate venous congestion. Left bibasilar airspace opacity. 

Small bilateral pleural effusions.


* Lasix 60mg IV STAT given


* Most recent ECHO 4/17: LVEF >70%


* HELD IVF


* Ordered duoneb once on 1/30/18 for wheezing.





(3) Bilateral airspace opacities


* Will cover for HAP


* CXR 1/28/18 - moderate venous congestion. Left bibasilar airspace opacity. 

Small bilateral pleural effusions.


* Vanco 500mg IV ONCE


* Continue Zosyn (renal dose) 2.25 gm IV Q6H 





(4) Elevated LFTs


* Likely secondary to alcohol 


* Resolved





(5) HTN (hypertension)


* Pt did not take medications on day of admission


* Propranolol 20mg po TID


* Lisinopril 40mg daily (increased from 20mg daily on 2/2/18)


* Started HCTZ 12.5mg PO daily


* Monitor BP





(6) Diabetes


* Hold home medication of Glimepiride


* Start ISS with Accuchecks Q6H


* Started Levemir 10u HS


* Monitor





(7) ETOH abuse


* Starting Librium 50mg Q8 prn- HOLD if sleeping or sedated 


 * Discontinued on 1/24/18 Ativan 1mg IV Q6h HENRY and PRN for alcohol withdrawal

- HOLD if sleeping or sedated.


* Thiamine PO daily 


* MV PO daily


* Folic Acid PO daily


* Monitor for withdrawal. Last drink 1/20/18


* Aspiration precautions


* Seizure precautions





(9) Constipation:


* Resolved


* Colace 100mg PO daily





(10) Anemia


* Stable


* Hgb 7.1 on 1/23 (decreased from 9.9 at admission)


* Type and cross


* 2 units PRBC given on 1/23--> Hgb improved


* Stool occult: negative


* Iron studies: iron 47, TIBC 202, %sat 23, Ferritin 561


* GI consulted, Dr. Quintanilla; recs appreciated


 * Patient went for endoscopy on 1/26/18. Repeat endoscopy in 8 weeks for 

surveillance based on path results


 * Patient needs colonoscopy; postponed due to possible pneumonia


 * Results: candidiasis esophagitis, gastric ulcer (biopsied); erythematous 

mucosa in prepyloric region of stomach and erythematous duodenopathy


 * Outpatient f/u in 5 weeks


 * Started sucralfate suspension 1gm PO BID for 10 weeks; protonix 40mg PO 

daily for 10 weeks.


 * Discontinued fluconazole 100mg PO daily for 10 days (last dose on 2/4/18)


* Alpha fetoprotein- 2.9, nml


* CA 19-9: 149, elevated


* CEA: 8.7 H


* Continue to monitor H/H





(11) Hyponatremia


* Resolved


* Continue to monitor labs





(12)  Bilirubinemia


* Resolved, within normal range


* T. Geovanni: 3.0 trending down


* Direct:1.0





(13) Prophylactic measure


* Protonix 40mg IV daily


* Heparin SC - HOLD


* SCDs


* Aspiration precautions


* Swallow eval-passed


* OOB, PT/OT


* Artificial tears





Discussed with Dr. Fung.





Disposition: Patient awaiting authorization from insurance for discharge to 

[facility]

## 2018-02-04 NOTE — PN
DATE:  02/04/2018



LOCATION:  663, bed A.



SUBJECTIVE:  This is 67-year-old male, seen early in rounds today without

significant clinical changes.  Appeared to be somewhat restless, still with

Crenshaw catheter in place, but no reported active bleeding, chest pain,

palpitation or significantly reported shortness of breath.  No chills or

fever reported.



The entire chart is reviewed including, but not limited to, the most recent

lab and radiology study results, current and the previous medication list,

current and previous medical events, and today's lab showed blood glucose

level of 279.  The patient lab results showed still low hemoglobin and

hematocrit with low potassium, less CO2 content, and mildly elevated

creatinine with low albumin and low total protein.



PHYSICAL EXAMINATION:

GENERAL:  A 67-year-old male appear to be more awake and alert.

VITAL SIGNS:  Afebrile with pulse of 72, respiratory rate 20 to 22, blood

pressure of 186/90.

HEENT:  Showed pale dry oral mucous membrane, nonicteric sclerae.

LUNGS:  Few scattered crepitation.  Decreased air entry at bases.

HEART:  Positive S1 and S2.

ABDOMEN:  Soft with mild distended with slight generalized tenderness.  No

mass or organomegaly.  No rebound tenderness or guarding.

EXTREMITIES:  Slight lower extremity edematous changes.  No clubbing or

cyanosis.

NEUROLOGICAL:  No reported new neurological deficits, sensory or motor.



IMPRESSION:

1.  Alcoholism with alcoholic liver disease.

2.  Bilateral pneumonia.

3.  Acute pancreatitis per recent history, alcohol-induced.

4.  Peptic ulcer disease.

5.  Known history of poorly controlled diabetes mellitus, hypertension by

history.

6.  Diverticulosis, colitis by recent colonoscopy.

7.  Electrolyte imbalance with hypocalcemia and hypokaliemia.

8.  Anemia secondary to above.

9.  Malnutrition with hypoalbuminemia.



SUGGESTIONS:

1.  Continue current management

2.  The patient may need CAT scan of the head, if his symptoms persist.

3.  Guaiac all the stools QED x3.

4.  Further evaluation to follow.







_________________________________________

Neri Griffin MD





DD:  02/04/2018 8:58:07

DT:  02/04/2018 11:32:04

Job # [id number]

## 2018-02-05 LAB
ALBUMIN SERPL-MCNC: 2.6 G/DL (ref 3.5–5)
ALBUMIN/GLOB SERPL: 0.9 {RATIO} (ref 1–2.1)
ALT SERPL-CCNC: 17 U/L (ref 21–72)
AST SERPL-CCNC: 17 U/L (ref 17–59)
BASOPHILS # BLD AUTO: 0.1 K/UL (ref 0–0.2)
BASOPHILS NFR BLD: 1.8 % (ref 0–2)
BUN SERPL-MCNC: 12 MG/DL (ref 9–20)
CALCIUM SERPL-MCNC: 8.4 MG/DL (ref 8.6–10.4)
EOSINOPHIL # BLD AUTO: 0.2 K/UL (ref 0–0.7)
EOSINOPHIL NFR BLD: 3.5 % (ref 0–4)
ERYTHROCYTE [DISTWIDTH] IN BLOOD BY AUTOMATED COUNT: 22.1 % (ref 11.5–14.5)
GFR NON-AFRICAN AMERICAN: 51
HGB BLD-MCNC: 8.7 G/DL (ref 12–18)
LYMPHOCYTES # BLD AUTO: 1.1 K/UL (ref 1–4.3)
LYMPHOCYTES NFR BLD AUTO: 23.9 % (ref 20–40)
MAGNESIUM SERPL-MCNC: 1.4 MG/DL (ref 1.6–2.3)
MCH RBC QN AUTO: 28.9 PG (ref 27–31)
MCHC RBC AUTO-ENTMCNC: 32.9 G/DL (ref 33–37)
MCV RBC AUTO: 88 FL (ref 80–94)
MONOCYTES # BLD: 0.4 K/UL (ref 0–0.8)
MONOCYTES NFR BLD: 7.9 % (ref 0–10)
NEUTROPHILS # BLD: 2.8 K/UL (ref 1.8–7)
NEUTROPHILS NFR BLD AUTO: 62.9 % (ref 50–75)
NRBC BLD AUTO-RTO: 0 % (ref 0–2)
PLATELET # BLD: 281 K/UL (ref 130–400)
PMV BLD AUTO: 8.5 FL (ref 7.2–11.7)
RBC # BLD AUTO: 3 MIL/UL (ref 4.4–5.9)
WBC # BLD AUTO: 4.5 K/UL (ref 4.8–10.8)

## 2018-02-05 RX ADMIN — POTASSIUM CHLORIDE SCH MEQ: 20 TABLET, EXTENDED RELEASE ORAL at 09:34

## 2018-02-05 RX ADMIN — POTASSIUM CHLORIDE SCH MEQ: 20 TABLET, EXTENDED RELEASE ORAL at 13:47

## 2018-02-05 RX ADMIN — SUCRALFATE SCH GM: 1 SUSPENSION ORAL at 11:47

## 2018-02-05 RX ADMIN — TAZOBACTAM SODIUM AND PIPERACILLIN SODIUM SCH MLS/HR: 250; 2 INJECTION, SOLUTION INTRAVENOUS at 18:07

## 2018-02-05 RX ADMIN — HUMAN INSULIN SCH UNIT: 100 INJECTION, SOLUTION SUBCUTANEOUS at 12:11

## 2018-02-05 RX ADMIN — Medication SCH ML: at 09:19

## 2018-02-05 RX ADMIN — POTASSIUM CHLORIDE SCH MEQ: 20 TABLET, EXTENDED RELEASE ORAL at 09:21

## 2018-02-05 RX ADMIN — INSULIN DETEMIR SCH UNIT: 100 INJECTION, SOLUTION SUBCUTANEOUS at 21:54

## 2018-02-05 RX ADMIN — MAGNESIUM SULFATE IN DEXTROSE SCH MLS/HR: 10 INJECTION, SOLUTION INTRAVENOUS at 11:47

## 2018-02-05 RX ADMIN — HUMAN INSULIN SCH UNIT: 100 INJECTION, SOLUTION SUBCUTANEOUS at 21:54

## 2018-02-05 RX ADMIN — TAZOBACTAM SODIUM AND PIPERACILLIN SODIUM SCH MLS/HR: 250; 2 INJECTION, SOLUTION INTRAVENOUS at 13:12

## 2018-02-05 RX ADMIN — SUCRALFATE SCH GM: 1 SUSPENSION ORAL at 17:15

## 2018-02-05 RX ADMIN — POLYVINYL ALCOHOL SCH DROP: 14 SOLUTION/ DROPS OPHTHALMIC at 09:19

## 2018-02-05 RX ADMIN — SUCRALFATE SCH GM: 1 SUSPENSION ORAL at 21:54

## 2018-02-05 RX ADMIN — HUMAN INSULIN SCH UNIT: 100 INJECTION, SOLUTION SUBCUTANEOUS at 09:20

## 2018-02-05 RX ADMIN — POTASSIUM CHLORIDE SCH: 20 TABLET, EXTENDED RELEASE ORAL at 11:26

## 2018-02-05 RX ADMIN — HUMAN INSULIN SCH UNIT: 100 INJECTION, SOLUTION SUBCUTANEOUS at 17:15

## 2018-02-05 RX ADMIN — SUCRALFATE SCH GM: 1 SUSPENSION ORAL at 09:20

## 2018-02-05 RX ADMIN — SUCRALFATE SCH GM: 1 SUSPENSION ORAL at 08:30

## 2018-02-05 RX ADMIN — TAZOBACTAM SODIUM AND PIPERACILLIN SODIUM SCH MLS/HR: 250; 2 INJECTION, SOLUTION INTRAVENOUS at 04:38

## 2018-02-05 RX ADMIN — TAZOBACTAM SODIUM AND PIPERACILLIN SODIUM SCH MLS/HR: 250; 2 INJECTION, SOLUTION INTRAVENOUS at 22:56

## 2018-02-05 RX ADMIN — MAGNESIUM SULFATE IN DEXTROSE SCH MLS/HR: 10 INJECTION, SOLUTION INTRAVENOUS at 09:33

## 2018-02-05 NOTE — PN
DATE:  02/05/2018



LOCATION:  663, bed A.



SUBJECTIVE:  This is a 67 years old male seen and examined earlier today

somewhat tolerating some oral intake without reported active bleeding,

appeared to be mildly semi disoriented with period of semi confusion.



The entire chart is reviewed including, but not limited to the most recent

lab and radiology study results, current and previous medication list,

current and previous medical events and today's labs showed drop of

hemoglobin to 8.7 with decreased hematocrit of 26.4 with low white blood

cells 4.5, abnormal platelet count with potassium still low at 2.9.  Blood

glucose level 276 with low calcium 8.4, low phosphorus 2.4, low magnesium

1.4 with low albumin 2.6, and low total protein 5.6.



PHYSICAL EXAMINATION:

GENERAL:  A 67 years old male.

VITAL SIGNS:  Afebrile with pulse of 74, respiratory rate of 20 to 22 with

blood pressure elevated to 190/106.

HEENT:  Showed mildly pale dry oral mucoid membrane.  Nonicteric sclerae.

LUNGS:  Scattered crepitation, decreased air entry at bases.

HEART:  Positive S1 and S2.

ABDOMEN:  Soft with slight distention.  Mild generalized tenderness.  No

mass or organomegaly.  No rebound tenderness or guarding.

RECTAL:  The patient refused.

EXTREMITIES:  Without significant clubbing, cyanosis or edema.



IMPRESSION:

1.  Alcoholism with alcoholic liver disease.

2.  _____.

3.  Bilateral pneumonia.

4.  Alcohol induced acute pancreatitis.

5.  Peptic ulcer disease by upper endoscopy.

6.  Poorly controlled hypertension and poorly controlled diabetes mellitus.

7.  Diverticulosis by recent colonoscopy.

8.  Anemia secondary to above.

9.  Electrolyte imbalance.

10.  Malnutrition with hypoalbuminemia slightly improved.



SUGGESTION:

1.  Agree with your plan.

2.  CAT scan of the head should be considered if the patient's change of

mental status continued.

3.  Repeat ammonia level.

4.  Further recommendation to follow.







__________________________________________

Neri Griffin MD





DD:  02/05/2018 11:02:57

DT:  02/05/2018 11:35:11

Job # [id number]

## 2018-02-05 NOTE — CP.PCM.PN
Subjective





- Date & Time of Evaluation


Date of Evaluation: 02/05/18


Time of Evaluation: 07:41





- Subjective


Subjective: 


Medicine progress note for Dr. Fung's service





Patient was seen and examined at bedside. Patient is alert, awake and eating 

breakfast. Patient has no complaints today and feels better. Patient denies 

chest pain, shortness of breath, abdominal pain, nausea, vomiting, fevers, 

headaches. 








Objective





- Vital Signs/Intake and Output


Vital Signs (last 24 hours): 


 











Temp Pulse Resp BP Pulse Ox


 


 97.2 F L  64   20   169/82 H  97 


 


 02/04/18 23:00  02/05/18 04:00  02/04/18 23:00  02/04/18 23:00  02/04/18 23:00








Intake and Output: 


 











 02/05/18 02/05/18





 06:59 18:59


 


Intake Total 750 


 


Output Total 2050 


 


Balance -1300 














- Medications


Medications: 


 Current Medications





Artificial Tears (Artificial Tears)  0 ml OD DAILY [facility]


   Last Admin: 02/04/18 09:24 Dose:  1 drop


Chlordiazepoxide (Librium)  50 mg PO Q8 PRN


   PRN Reason: Agitation


   Last Admin: 02/03/18 01:16 Dose:  50 mg


Docusate Sodium (Colace)  100 mg PO DAILY [facility]


   Last Admin: 02/04/18 09:23 Dose:  100 mg


Folic Acid (Folic Acid)  1 mg PO DAILY [facility]


   Last Admin: 02/04/18 09:24 Dose:  1 mg


Furosemide (Lasix)  40 mg PO DAILY [facility]


   Last Admin: 02/04/18 09:25 Dose:  40 mg


Heparin Sodium (Porcine) (Heparin)  5,000 units SC Q8 [facility]


   Last Admin: 01/25/18 06:18 Dose:  5,000 units


Hydralazine HCl (Apresoline)  10 mg IVP Q6H PRN


   PRN Reason: Systolic Blood Pressure


   Last Admin: 02/03/18 04:44 Dose:  10 mg


Hydrochlorothiazide (Microzide)  12.5 mg PO DAILY [facility]


   Last Admin: 02/04/18 13:49 Dose:  12.5 mg


Sodium Chloride (Sodium Chloride 0.45%)  1,000 mls @ 75 mls/hr IV .[id number] [facility]


   Last Admin: 01/28/18 01:50 Dose:  75 mls/hr


Piperacillin Sod/Tazobactam Sod (Zosyn 2.25 Gm Iv Premix)  2.25 gm in 50 mls @ 

100 mls/hr IVPB Q6H [facility]


   Last Admin: 02/05/18 04:38 Dose:  100 mls/hr


Lactated Ringer's (Lactated Ringer's)  1,000 mls @ 125 mls/hr IV .Q8H [facility]


   Last Admin: 02/04/18 05:20 Dose:  125 mls/hr


Insulin Detemir (Levemir)  10 unit SC [facility]


   Last Admin: 02/04/18 21:49 Dose:  10 unit


Insulin Human Regular (Novolin R)  0 unit SC [facility]


   PRN Reason: Protocol


   Last Admin: 02/04/18 23:00 Dose:  Not Given


Lisinopril (Zestril)  40 mg PO DAILY [facility]


   Last Admin: 02/04/18 10:43 Dose:  Not Given


Multivitamins/Vitamin C (Multi-Delyn Liquid)  5 ml PO DAILY [facility]


   Last Admin: 02/04/18 09:24 Dose:  5 ml


Pantoprazole Sodium (Protonix Inj)  40 mg IVP Q12H [facility]


   Last Admin: 02/05/18 06:13 Dose:  40 mg


Potassium Chloride (K-Dur 20 Meq Er Tab)  20 meq PO DAILY [facility]


   Last Admin: 02/04/18 09:23 Dose:  20 meq


Propranolol HCl (Inderal)  20 mg PO TID [facility]


   Last Admin: 02/04/18 17:14 Dose:  20 mg


Suc[facility]ate ([facility] Susp)  [facility]


   Last Admin: 02/04/18 21:48 Dose:  1 gm


Thiamine HCl (Vitamin B1 Tab)  100 mg PO DAILY [facility]


   Last Admin: 02/04/18 10:42 Dose:  Not Given











- Labs


Labs: 


 





 02/05/18 07:07 





 02/04/18 08:43 





 











PT  14.6 SECONDS (9.7-12.2)  H  01/27/18  11:40    


 


INR  1.3   01/27/18  11:40    


 


APTT  30 SECONDS (21-34)   01/27/18  11:40    














- Additional Findings


Additional findings: 





- Constitutional


Appears: No Acute Distress, pleasant





- Head Exam


Head Exam: ATRAUMATIC, NORMOCEPHALIC





- Eye Exam


Eye Exam: EOMI





- ENT Exam


ENT Exam: Mucous Membranes Moist





- Respiratory Exam


Respiratory Exam: NORMAL BREATHING PATTERN, decreased breath sounds.  absent: 

Rales, Rhonchi, Respiratory Distress





- Cardiovascular Exam


Cardiovascular Exam: REGULAR RHYTHM, +S1, +S2





- GI/Abdominal Exam


GI & Abdominal Exam: Soft, Normal Bowel Sounds.  absent: Distended, Firm, 

Tenderness





- Neurological Exam


Neurological Exam: Alert, Awake





- Psychiatric Exam


Psychiatric exam: Alert, Awake





- Skin


Skin Exam: Dry, Intact, Normal Color, Warm





Assessment and Plan





- Assessment and Plan (Free Text)


Plan: 





(1) Acute pancreatitis


* Likely secondary to alcohol 


* Resolved


* Imaging:


 * CT of Abdomen/Pelvis: Findings consistent with mild or early acute 

pancreatitis.  New low-density/cystic 1.3 cm mass in body of pancreas. 

Nonspecific.  Possible intrapancreatic pseudocyst from prior pancreatitis.  

Possible IPMN.  Follow-up advised. Fatty infiltration of the liver with sparing 

of the left lobe and mild atrophy of the left lobe. Mildly thickened bladder 

wall but there is poor distension of the urinary bladder.  Rule out cystitis. 

Nonobstructing tiny right renal calculus. Ill-defined opacity in right lower 

lobe.  Rule out pneumonia.  Circumferential mural thickening of distal 

esophagus.  Rule out neoplasm.  Consider evaluation with endoscopy when 

clinically feasible.


* Lipase 2310 --> 2761--> 75 on 2/1


* Amylase: 200


* Lipid panel: , Cholesterol 137, LDL 42, HDL 67


* Medications


 * Discontinue Morphine 1mg IV Q4H prn pain


 * Discontinue Zofran 4mg IVP Q8H prn N/V





(2) Venous congestion


* CXR 1/28/18 - moderate venous congestion. Left bibasilar airspace opacity. 

Small bilateral pleural effusions.


* Lasix 60mg IV STAT given


* Most recent ECHO 4/17: LVEF >70%


* HELD IVF


* Ordered duoneb once on 1/30/18 for wheezing.





(3) Bilateral airspace opacities


* Will cover for HAP


* CXR 1/28/18 - moderate venous congestion. Left bibasilar airspace opacity. 

Small bilateral pleural effusions.


* Vanco 500mg IV ONCE


* Continue Zosyn (renal dose) 2.25 gm IV Q6H 





(4) Elevated LFTs


* Likely secondary to alcohol 


* Resolved





(5) HTN (hypertension)


* Pt did not take medications on day of admission


* Propranolol 20mg po TID


* Lisinopril 40mg daily (increased from 20mg daily on 2/2/18)


* HCTZ 25mg PO daily (increased from 12.5mg)


* Monitor BP





(6) Diabetes


* Hold home medication of Glimepiride


* Start ISS with Accuchecks Q6H


* Started Levemir 10u HS


* Monitor





(7) ETOH abuse


* Starting Librium 50mg Q8 prn- HOLD if sleeping or sedated 


 * Discontinued on 1/24/18 Ativan 1mg IV Q6h HENRY and PRN for alcohol withdrawal

- HOLD if sleeping or sedated.


* Thiamine PO daily 


* MV PO daily


* Folic Acid PO daily


* Monitor for withdrawal. Last drink 1/20/18


* Aspiration precautions


* Seizure precautions





(9) Constipation:


* Resolved


* Colace 100mg PO daily





(10) Anemia


* Stable


* Hgb 7.1 on 1/23 (decreased from 9.9 at admission)


* Type and cross


* 2 units PRBC given on 1/23--> Hgb improved


* Stool occult: negative


* Iron studies: iron 47, TIBC 202, %sat 23, Ferritin 561


* GI consulted, Dr. Quintanilla; recs appreciated


 * Patient went for endoscopy on 1/26/18. Repeat endoscopy in 8 weeks for 

surveillance based on path results


 * Patient needs colonoscopy; postponed due to possible pneumonia


 * Results: candidiasis esophagitis, gastric ulcer (biopsied); erythematous 

mucosa in prepyloric region of stomach and erythematous duodenopathy


 * Outpatient f/u in 5 weeks


 * Started sucralfate suspension 1gm PO BID for 10 weeks; protonix 40mg PO 

daily for 10 weeks.


 * Discontinued fluconazole 100mg PO daily for 10 days (last dose on 2/4/18)


* Alpha fetoprotein- 2.9, nml


* CA 19-9: 149, elevated


* CEA: 8.7 H


* Continue to monitor H/H





(11) Hyponatremia


* Resolved


* Continue to monitor labs





(12)  Bilirubinemia


* Resolved, within normal range


* T. Geovanni: 3.0 trending down


* Direct:1.0





(13) Prophylactic measure


* Protonix 40mg IV daily


* Heparin SC - HOLD


* SCDs


* Aspiration precautions


* Swallow eval-passed


* OOB, PT/OT


* Artificial tears





Discussed with Dr. Fung.





Disposition: Patient awaiting authorization from insurance for discharge to 

[facility]

## 2018-02-05 NOTE — PN
DATE:  02/03/2018



LOCATION:  664, bed A.



SUBJECTIVE:  This 67-year-old male, seen and examined in rounds with period

of semi disorientation and confusion, with restless behavior, seen and

examined early in rounds with Crenshaw catheter still in place.



No reported chest pain, palpitation, active bleeding or significant

shortness of breath, but poor oral intake.



The entire chart is reviewed including but not limited to the most recent

lab and radiology study results, current and the previous medication list,

current and the previous medical events.



LABORATORY DATA:  Today's lab showed low hemoglobin of 9.0, hematocrit of

27.7 with low potassium 3.3, low CO2 content 20 indicative of metabolic

acidosis with creatinine elevated at 1.6.  Blood glucose level at 314. 

Calcium 8.1 with low albumin 2.7 with total protein 5.5.



PHYSICAL EXAMINATION:

GENERAL:  A 67 years male.

VITAL SIGNS:  Afebrile with pulse of 64, respiratory rate of 20 to 22, and

blood pressure 138/76.

HEENT:  Shows pale dry oral mucous membrane, nonicteric sclerae.

LUNGS:  Few scattered crepitation, decreased air entry at bases.

HEART:  Positive S1 and S2,.

ABDOMEN:  Soft.  Bowel sounds are present.  Mildly distended.  Mildly

obese.  No mass or organomegaly.

RECTAL:  The patient refused.

EXTREMITIES:  Lower extremity with edematous changes.  No clubbing or

cyanosis.

NEUROLOGIC:  No reported new neurological deficits, sensory or motor.



IMPRESSION:

1.  Alcoholism.

2.  Alcoholic liver disease.

3.  Acute pancreatitis, alcohol-induced.

4.  Peptic ulcer disease.

5.  Bilateral pneumonia.

6.  Known history of poorly controlled diabetes mellitus, hypertension.

7.  Diverticulosis with left sided colitis.

8.  Hypoalbuminemia with malnutrition.



SUGGESTIONS:

1.  Continue current management.

2.  CAT scan of the head due to the patient's recent confusion.

3.  Neurology consultation.

4.  _____.

5.  Further recommendation to follow.







__________________________________________

Neri Griffin MD





DD:  02/03/2018 11:56:37

DT:  02/03/2018 12:53:24

Job # [id number]

## 2018-02-06 VITALS
DIASTOLIC BLOOD PRESSURE: 77 MMHG | HEART RATE: 68 BPM | TEMPERATURE: 97.4 F | SYSTOLIC BLOOD PRESSURE: 149 MMHG | OXYGEN SATURATION: 97 %

## 2018-02-06 LAB
ALBUMIN SERPL-MCNC: 2.8 G/DL (ref 3.5–5)
ALBUMIN/GLOB SERPL: 0.8 {RATIO} (ref 1–2.1)
ALT SERPL-CCNC: 20 U/L (ref 21–72)
AST SERPL-CCNC: 21 U/L (ref 17–59)
BASOPHILS # BLD AUTO: 0.1 K/UL (ref 0–0.2)
BASOPHILS NFR BLD: 1.6 % (ref 0–2)
BUN SERPL-MCNC: 12 MG/DL (ref 9–20)
CALCIUM SERPL-MCNC: 8.7 MG/DL (ref 8.6–10.4)
EOSINOPHIL # BLD AUTO: 0.2 K/UL (ref 0–0.7)
EOSINOPHIL NFR BLD: 3.3 % (ref 0–4)
ERYTHROCYTE [DISTWIDTH] IN BLOOD BY AUTOMATED COUNT: 21.8 % (ref 11.5–14.5)
GFR NON-AFRICAN AMERICAN: 51
HGB BLD-MCNC: 9.6 G/DL (ref 12–18)
LYMPHOCYTES # BLD AUTO: 1 K/UL (ref 1–4.3)
LYMPHOCYTES NFR BLD AUTO: 18.8 % (ref 20–40)
MAGNESIUM SERPL-MCNC: 1.6 MG/DL (ref 1.6–2.3)
MCH RBC QN AUTO: 28.6 PG (ref 27–31)
MCHC RBC AUTO-ENTMCNC: 32.8 G/DL (ref 33–37)
MCV RBC AUTO: 87.2 FL (ref 80–94)
MONOCYTES # BLD: 0.3 K/UL (ref 0–0.8)
MONOCYTES NFR BLD: 6.7 % (ref 0–10)
NEUTROPHILS # BLD: 3.6 K/UL (ref 1.8–7)
NEUTROPHILS NFR BLD AUTO: 69.6 % (ref 50–75)
NRBC BLD AUTO-RTO: 0.1 % (ref 0–2)
PLATELET # BLD: 272 K/UL (ref 130–400)
PMV BLD AUTO: 8.4 FL (ref 7.2–11.7)
RBC # BLD AUTO: 3.36 MIL/UL (ref 4.4–5.9)
WBC # BLD AUTO: 5.2 K/UL (ref 4.8–10.8)

## 2018-02-06 RX ADMIN — POLYVINYL ALCOHOL SCH DROP: 14 SOLUTION/ DROPS OPHTHALMIC at 09:14

## 2018-02-06 RX ADMIN — SUCRALFATE SCH GM: 1 SUSPENSION ORAL at 07:53

## 2018-02-06 RX ADMIN — SUCRALFATE SCH GM: 1 SUSPENSION ORAL at 11:42

## 2018-02-06 RX ADMIN — TAZOBACTAM SODIUM AND PIPERACILLIN SODIUM SCH MLS/HR: 250; 2 INJECTION, SOLUTION INTRAVENOUS at 05:40

## 2018-02-06 RX ADMIN — Medication SCH ML: at 09:16

## 2018-02-06 RX ADMIN — HUMAN INSULIN SCH UNIT: 100 INJECTION, SOLUTION SUBCUTANEOUS at 07:53

## 2018-02-06 RX ADMIN — POTASSIUM CHLORIDE SCH MEQ: 20 TABLET, EXTENDED RELEASE ORAL at 09:15

## 2018-02-06 NOTE — CP.PCM.DIS
Provider





- Provider


Date of Admission: 


18 17:17





Attending physician: 


Marlo Fung Jr, MD





Primary care physician: 


Dr. Fung


Consults: 


GI: Dr. Quintanilla


Time Spent in preparation of Discharge (in minutes): 45





Hospital Course





- Lab Results


Lab Results: 


 Micro Results





18 01:45   Blood   Blood Culture - Final


                            NO GROWTH AFTER 5 DAYS


18 01:45   Blood   Gram Stain - Final


                            TEST NOT PERFORMED


18 01:10   Blood   Blood Culture - Final


                            NO GROWTH AFTER 5 DAYS


18 01:10   Blood   Gram Stain - Final


                            TEST NOT PERFORMED





 Most Recent Lab Values











WBC  5.2 K/uL (4.8-10.8)   18  06:21    


 


RBC  3.36 Mil/uL (4.40-5.90)  L  18  06:21    


 


Hgb  9.6 g/dL (12.0-18.0)  L  18  06:21    


 


Hct  29.3 % (35.0-51.0)  L  18  06:21    


 


MCV  87.2 fL (80.0-94.0)   18  06:21    


 


MCH  28.6 pg (27.0-31.0)   18  06:21    


 


MCHC  32.8 g/dL (33.0-37.0)  L  18  06:21    


 


RDW  21.8 % (11.5-14.5)  H  18  06:21    


 


Plt Count  272 K/uL (130-400)   18  06:21    


 


MPV  8.4 fL (7.2-11.7)   18  06:21    


 


Neut % (Auto)  69.6 % (50.0-75.0)   18  06:21    


 


Lymph % (Auto)  18.8 % (20.0-40.0)  L  18  06:21    


 


Mono % (Auto)  6.7 % (0.0-10.0)   18  06:21    


 


Eos % (Auto)  3.3 % (0.0-4.0)   18  06:21    


 


Baso % (Auto)  1.6 % (0.0-2.0)   18  06:21    


 


Neut # (Auto)  3.6 K/uL (1.8-7.0)   18  06:21    


 


Lymph # (Auto)  1.0 K/uL (1.0-4.3)   18  06:21    


 


Mono # (Auto)  0.3 K/uL (0.0-0.8)   18  06:21    


 


Eos # (Auto)  0.2 K/uL (0.0-0.7)   18  06:21    


 


Baso # (Auto)  0.1 K/uL (0.0-0.2)   18  06:21    


 


Neutrophils % (Manual)  86 % (50-75)  H  18  07:36    


 


Band Neutrophils %  2 % (0-2)   18  07:36    


 


Lymphocytes % (Manual)  7 % (20-40)  L  18  07:36    


 


Monocytes % (Manual)  3 % (0-10)   18  07:36    


 


Eosinophils % (Manual)  2 % (0-4)   18  07:36    


 


Differential Comment     18  07:45    


 


Platelet Estimate  Normal  (NORMAL)   18  07:36    


 


Large Platelets  Present   18  07:36    


 


Hypochromasia (manual)  Slight   18  06:40    


 


Target Cells  Slight   18  06:40    


 


Polychromasia  Slight   18  01:30    


 


Anisocytosis (manual)  Moderate   18  07:36    


 


Ovalocytes  Slight   18  01:30    


 


Retic Count  3.5 % (0.5-1.5)  H  18  06:25    


 


PT  14.6 SECONDS (9.7-12.2)  H  18  11:40    


 


INR  1.3   18  11:40    


 


APTT  30 SECONDS (21-34)   18  11:40    


 


Sodium  138 mmol/L (132-148)   18  06:21    


 


Potassium  3.2 mmol/L (3.6-5.2)  L  18  06:21    


 


Chloride  102 mmol/L ()   18  06:21    


 


Carbon Dioxide  27 mmol/L (22-30)   18  06:21    


 


Anion Gap  12  (10-20)   18  06:21    


 


BUN  12 mg/dL (9-20)   18  06:21    


 


Creatinine  1.4 mg/dL (0.8-1.5)   18  06:21    


 


Est GFR ( Amer)  > 60   18  06:21    


 


Est GFR (Non-Af Amer)  51   18  06:21    


 


POC Glucose (mg/dL)  400 mg/dL ()  H*  18  06:29    


 


Random Glucose  330 mg/dL ()  H  18  06:21    


 


Calcium  8.7 mg/dl (8.6-10.4)   18  06:21    


 


Iron  47 ug/dL ()  L  18  06:42    


 


TIBC  202 ug/dL (250-450)  L  18  06:42    


 


% Saturation  23  (20-55)   18  06:42    


 


Phosphorus  2.4 mg/dL (2.5-4.5)  L  18  07:07    


 


Magnesium  1.6 mg/dL (1.6-2.3)   18  06:21    


 


Ferritin  561.0 ng/mL  18  06:25    


 


Direct Bilirubin  1.0 mg/dL (0.0-0.4)  H  18  07:23    


 


Total Bilirubin  0.7 mg/dL (0.2-1.3)   18  06:21    


 


AST  21 U/L (17-59)   18  06:21    


 


ALT  20 U/L (21-72)  L  18  06:21    


 


Alkaline Phosphatase  84 U/L ()   18  06:21    


 


Ammonia  < 9 umol/L (9-33)  L  18  01:30    


 


Total Protein  6.1 g/dL (6.3-8.3)  L  18  06:21    


 


Albumin  2.8 g/dL (3.5-5.0)  L  18  06:21    


 


Globulin  3.3 gm/dL (2.2-3.9)   18  06:21    


 


Albumin/Globulin Ratio  0.8  (1.0-2.1)  L  18  06:21    


 


Triglycerides  147 mg/dL (0-149)   18  07:23    


 


Cholesterol  137 mg/dL (0-199)   18  07:23    


 


LDL Cholesterol Direct  42 mg/dL (0-129)   18  07:23    


 


HDL Cholesterol  67 mg/dL (30-70)   18  07:23    


 


Amylase  32 U/L ()   18  07:45    


 


Lipase  75 U/L ()   18  06:03    


 


Alpha Fetoprotein  2.9 ng/mL (0.0-7.5)   18  06:25    


 


Carcinoembryonic Ag  8.7 ng/mL (0-3.0)  H  18  06:25    


 


CA 19-9 Antigen  149 U/mL (0-37)  H D 18  06:25    


 


Procalcitonin  0.17 NG/ML (0.19-0.49)  L  18  20:02    


 


Urine Color  Yellow  (YELLOW)   18  01:40    


 


Urine Clarity  Hazy  (Clear)   18  01:40    


 


Urine pH  6.0  (5.0-8.0)   18  01:40    


 


Ur Specific Gravity  1.012  (1.003-1.030)   18  01:40    


 


Urine Protein  1+ mg/dL (NEGATIVE)  H  18  01:40    


 


Urine Glucose (UA)  3+ mg/dL (Normal)  H  18  01:40    


 


Urine Ketones  Negative mg/dL (NEGATIVE)   18  01:40    


 


Urine Blood  3+  (NEGATIVE)  H  18  01:40    


 


Urine Nitrate  Negative  (NEGATIVE)   18  01:40    


 


Urine Bilirubin  Negative  (NEGATIVE)   18  01:40    


 


Urine Urobilinogen  Normal mg/dL (0.2-1.0)   18  01:40    


 


Ur Leukocyte Esterase  Neg Dahlia/uL (Negative)   18  01:40    


 


Urine WBC (Auto)  3 /hpf (0-5)   18  01:40    


 


Urine RBC (Auto)  61 /hpf (0-3)  H  18  01:40    


 


Hyaline Casts  3-5 /lpf (0-2)  H  18  16:30    


 


Urine Bacteria  Rare  (<OCC)   18  01:40    


 


Stool Occult Blood  Negative  (NEGATIVE)   18  17:01    


 


Blood Type  O POSITIVE   18  18:31    


 


Antibody Screen  Negative   18  18:31    














- Hospital Course


Hospital Course: 


CC: "Vomiting"





HPI: 67 year old male with past medical history of alcohol abuse, pancreatitis, 

DM and HTN presents to the ED for vomiting.  He states he has been vomiting for 

the past 2 days.  He has vomited about 5-6x per day.  He states he tried Tums 

which helped at first but it stopped helping. Patient states he has pain in his 

stomach that is burning and constant.  The pain is currently a 1/10.  Patient 

states his last meal was at 1pm yesterday.  He also states his last drink was 

yesterday.  He denies withdrawal symptoms due to drinking.  Patient denies fever

, diarrhea, constipation, chest pain, difficulty breathing, or dysuria. 





PMD: Dr. Fung


PMHx: ETOH abuse, ETOH Pancreatitis, DM, HTN


Medications: Glimiperide 4 mg PO BID, Lisinopril 20 mg PO daily, Lasix 40 mg PO 

daily, Propranalol 20 mg PO TID, KCl 20 mg PO BID, Humulin N sliding scale. 


PSHx: appendectomy


Family hx: Both parents , father with DM. 


Social hx: Drinks about 6-9 shots of vodka daily. Last drink was yesterday. 

Former smoker, quit 20 yrs ago, denies drug use. Lives with family, retired. 


Allergies: NKDA. 








Hospital Course: Patient was admitted on 18 for vomiting, pancreatitis. In 

the ED, labs were drawn, imaging taken, and medications were given. Abdomen/

pelvis CT showed findings consistent with mild or early acute pancreatitis; new 

low-density/cystic 1.3 cm mass in body of pancreas;nonspecific; possible 

intrapancreatic pseudocyst from prior pancreatitis; possible IPMN; fatty 

infiltration of the liver with sparing of the left lobe and mild atrophy of the 

left lobe; mildly thickened bladder wall but there is poor distension of the 

urinary bladder, rule out cystitis; nonobstructing tiny right renal calculus; 

ill-defined opacity in right lower lobe, rule out pneumonia; circumferential 

mural thickening of distal esophagus, rule out neoplasm; consider evaluation 

with endoscopy when clinically feasible. Lipase was elevated at 2310 and 

amylase at 200. Patient was started on IV fluids, morphine, and zofran. Placed 

on NPO. Patient was started on librium, multivitamins, thiamine, and folic acid 

for history of alcohol abuse to prevent severe withdrawal symptoms. Patients 

home medications for hypertension, diabetes were restarted and conditions were 

monitored and managed throughout course. Patient developed worsening anemia 

during hospital course. Patient was given 2 units of PRBC on , stool occult 

ordered (negative), iron studies ordered, and GI was consulted (Dr. Quintanilla). 

Anemia improved and remained stable. Patient underwent endoscopy and 

colonoscopy with Dr. Quintanilla. Endoscopy () showed candidiasis esophagitis, 

gastric ulcer (biopsied); erythematous mucosa in prepyloric region of stomach 

and erythematous duodenopathy. Patient was started on diflucan for 10 days, 

sucralfate suspension 1gm PO BID for 10 weeks and protonix 40mg PO daily for 10 

weeks. Colonoscopy showed colitis, diverticulitis, internal hemorrhoids, and 

chronic inflammation. Patient had repeat chest xray which showed venous 

congestion, left bibasilar airspace opacity, small bilateral pleural effusions. 

IV fluids were discontinued, lasix were started, and antibiotics were started 

for possible CAP. Patient's pancreatitis continued to improved throughout 

hospital course and lipase returned to normal range. Patient received physical 

therapy during course. Case management and  arranged patient for 

placement at [facility]. Patient is stable for discharge to [facility]. 

Patient must continue all medications. Patient must continue antibiotics for 

additional 10 days. Patient must follow up with PMD within 1-2 weeks of 

discharge. 





This is a brief summary of the hospital course. Please see EMR for more 

details. 





Discharge Exam





- Additional Findings


Additional findings: 





- Constitutional


Appears: No Acute Distress, pleasant





- Head Exam


Head Exam: ATRAUMATIC, NORMOCEPHALIC





- Eye Exam


Eye Exam: EOMI





- ENT Exam


ENT Exam: Mucous Membranes Moist





- Respiratory Exam


Respiratory Exam: NORMAL BREATHING PATTERN, decreased breath sounds.  absent: 

Rales, Rhonchi, Respiratory Distress





- Cardiovascular Exam


Cardiovascular Exam: REGULAR RHYTHM, +S1, +S2





- GI/Abdominal Exam


GI & Abdominal Exam: Soft, Normal Bowel Sounds.  absent: Distended, Firm, 

Tenderness





- Neurological Exam


Neurological Exam: Alert, Awake





- Psychiatric Exam


Psychiatric exam: Alert, Awake





- Skin


Skin Exam: Dry, Intact, Normal Color, Warm





Discharge Plan





- Follow Up Plan


Condition: STABLE


Disposition: HOME/ ROUTINE


Instructions:  Pancreatitis (DC), Colonoscopy (DC), Heart Healthy Diet (DC), 

Alcohol Intoxication (DC), Abuse of Alcohol (DC), Upper Endoscopy (DC)


Additional Instructions: 


Patient is stable for discharge to [facility]. Patient should continue all 

medications. Patient must continue antibiotic, Zosyn, for an additional 10 days 

(last dose to be given on 18). Patient must follow up with their PMD, Antonieta Ribera, within 1-2 weeks of discharge. Patient should follow up with GI, Dr. Quintanilla

, within 3-4 weeks of discharge. If symptoms reoccur or worsen, patient should 

return to the ED.


Referrals: 


Marlo Fung Jr., MD [Medical Doctor] -

## 2018-02-13 ENCOUNTER — HOSPITAL ENCOUNTER (INPATIENT)
Dept: HOSPITAL 31 - C.ER | Age: 68
LOS: 14 days | Discharge: NURSING, REHAB OR LONG-TERM CARE | DRG: 377 | End: 2018-02-27
Attending: INTERNAL MEDICINE | Admitting: INTERNAL MEDICINE
Payer: MEDICARE

## 2018-02-13 VITALS — BODY MASS INDEX: 29.5 KG/M2

## 2018-02-13 DIAGNOSIS — E87.2: ICD-10-CM

## 2018-02-13 DIAGNOSIS — K25.9: ICD-10-CM

## 2018-02-13 DIAGNOSIS — K52.9: ICD-10-CM

## 2018-02-13 DIAGNOSIS — E11.22: ICD-10-CM

## 2018-02-13 DIAGNOSIS — K29.61: Primary | ICD-10-CM

## 2018-02-13 DIAGNOSIS — K86.0: ICD-10-CM

## 2018-02-13 DIAGNOSIS — T50.1X5A: ICD-10-CM

## 2018-02-13 DIAGNOSIS — E11.43: ICD-10-CM

## 2018-02-13 DIAGNOSIS — Z79.4: ICD-10-CM

## 2018-02-13 DIAGNOSIS — N18.9: ICD-10-CM

## 2018-02-13 DIAGNOSIS — E46: ICD-10-CM

## 2018-02-13 DIAGNOSIS — B37.81: ICD-10-CM

## 2018-02-13 DIAGNOSIS — E11.65: ICD-10-CM

## 2018-02-13 DIAGNOSIS — I12.9: ICD-10-CM

## 2018-02-13 DIAGNOSIS — D61.818: ICD-10-CM

## 2018-02-13 DIAGNOSIS — E86.0: ICD-10-CM

## 2018-02-13 DIAGNOSIS — K70.30: ICD-10-CM

## 2018-02-13 DIAGNOSIS — K57.92: ICD-10-CM

## 2018-02-13 DIAGNOSIS — K70.40: ICD-10-CM

## 2018-02-13 DIAGNOSIS — K31.84: ICD-10-CM

## 2018-02-13 DIAGNOSIS — F10.20: ICD-10-CM

## 2018-02-13 DIAGNOSIS — N17.9: ICD-10-CM

## 2018-02-13 DIAGNOSIS — K64.8: ICD-10-CM

## 2018-02-13 DIAGNOSIS — K85.90: ICD-10-CM

## 2018-02-13 DIAGNOSIS — G31.2: ICD-10-CM

## 2018-02-13 DIAGNOSIS — D69.59: ICD-10-CM

## 2018-02-13 DIAGNOSIS — D50.0: ICD-10-CM

## 2018-02-13 DIAGNOSIS — E77.8: ICD-10-CM

## 2018-02-13 LAB
ALBUMIN SERPL-MCNC: 3.8 G/DL (ref 3.5–5)
ALBUMIN/GLOB SERPL: 0.9 {RATIO} (ref 1–2.1)
ALT SERPL-CCNC: 34 U/L (ref 21–72)
APTT BLD: 38 SECONDS (ref 21–34)
AST SERPL-CCNC: 41 U/L (ref 17–59)
BACTERIA #/AREA URNS HPF: (no result) /[HPF]
BASOPHILS # BLD AUTO: 0.1 K/UL (ref 0–0.2)
BASOPHILS NFR BLD: 0.9 % (ref 0–2)
BILIRUB UR-MCNC: NEGATIVE MG/DL
BUN SERPL-MCNC: 39 MG/DL (ref 9–20)
CALCIUM SERPL-MCNC: 9.2 MG/DL (ref 8.6–10.4)
EOSINOPHIL # BLD AUTO: 0.2 K/UL (ref 0–0.7)
EOSINOPHIL NFR BLD: 1 % (ref 0–4)
EOSINOPHIL NFR BLD: 2.3 % (ref 0–4)
ERYTHROCYTE [DISTWIDTH] IN BLOOD BY AUTOMATED COUNT: 21.5 % (ref 11.5–14.5)
GFR NON-AFRICAN AMERICAN: 13
GLUCOSE UR STRIP-MCNC: (no result) MG/DL
HGB BLD-MCNC: 10.4 G/DL (ref 12–18)
HYALINE CASTS #/AREA URNS LPF: (no result) /LPF (ref 0–2)
INR PPP: 1.2
LEUKOCYTE ESTERASE UR-ACNC: (no result) LEU/UL
LYMPHOCYTE: 7 % (ref 20–40)
LYMPHOCYTES # BLD AUTO: 0.9 K/UL (ref 1–4.3)
LYMPHOCYTES NFR BLD AUTO: 8.5 % (ref 20–40)
MCH RBC QN AUTO: 28.8 PG (ref 27–31)
MCHC RBC AUTO-ENTMCNC: 33 G/DL (ref 33–37)
MCV RBC AUTO: 87.3 FL (ref 80–94)
MONOCYTE: 4 % (ref 0–10)
MONOCYTES # BLD: 0.3 K/UL (ref 0–0.8)
MONOCYTES NFR BLD: 3.2 % (ref 0–10)
NEUTROPHILS # BLD: 8.8 K/UL (ref 1.8–7)
NEUTROPHILS NFR BLD AUTO: 85.1 % (ref 50–75)
NEUTROPHILS NFR BLD AUTO: 87 % (ref 50–75)
NEUTS BAND NFR BLD: 1 % (ref 0–2)
NRBC BLD AUTO-RTO: 0 % (ref 0–2)
PH UR STRIP: 5 [PH] (ref 5–8)
PLATELET # BLD EST: NORMAL 10*3/UL
PLATELET # BLD: 197 K/UL (ref 130–400)
PMV BLD AUTO: 10 FL (ref 7.2–11.7)
PROT UR STRIP-MCNC: (no result) MG/DL
PROTHROMBIN TIME: 13.8 SECONDS (ref 9.7–12.2)
RBC # BLD AUTO: 3.63 MIL/UL (ref 4.4–5.9)
RBC # UR STRIP: (no result) /UL
SP GR UR STRIP: 1.02 (ref 1–1.03)
SQUAMOUS EPITHIAL: 6 /HPF (ref 0–5)
TOTAL CELLS COUNTED BLD: 100
URINE NITRATE: NEGATIVE
UROBILINOGEN UR-MCNC: NORMAL MG/DL (ref 0.2–1)
WBC # BLD AUTO: 10.3 K/UL (ref 4.8–10.8)

## 2018-02-13 RX ADMIN — TAZOBACTAM SODIUM AND PIPERACILLIN SODIUM SCH MLS/HR: 250; 2 INJECTION, SOLUTION INTRAVENOUS at 16:00

## 2018-02-13 RX ADMIN — SUCRALFATE SCH: 1 SUSPENSION ORAL at 21:23

## 2018-02-13 RX ADMIN — POTASSIUM CHLORIDE SCH: 20 TABLET, EXTENDED RELEASE ORAL at 10:10

## 2018-02-13 RX ADMIN — SUCRALFATE SCH: 1 SUSPENSION ORAL at 11:09

## 2018-02-13 RX ADMIN — HUMAN INSULIN SCH UNIT: 100 INJECTION, SOLUTION SUBCUTANEOUS at 17:09

## 2018-02-13 RX ADMIN — HUMAN INSULIN SCH UNIT: 100 INJECTION, SOLUTION SUBCUTANEOUS at 09:12

## 2018-02-13 RX ADMIN — HUMAN INSULIN SCH: 100 INJECTION, SOLUTION SUBCUTANEOUS at 22:19

## 2018-02-13 RX ADMIN — POLYVINYL ALCOHOL SCH DROP: 14 SOLUTION/ DROPS OPHTHALMIC at 17:10

## 2018-02-13 RX ADMIN — TAZOBACTAM SODIUM AND PIPERACILLIN SODIUM SCH MLS/HR: 250; 2 INJECTION, SOLUTION INTRAVENOUS at 23:00

## 2018-02-13 RX ADMIN — SUCRALFATE SCH GM: 1 SUSPENSION ORAL at 12:30

## 2018-02-13 RX ADMIN — HUMAN INSULIN SCH UNIT: 100 INJECTION, SOLUTION SUBCUTANEOUS at 12:10

## 2018-02-13 RX ADMIN — SUCRALFATE SCH GM: 1 SUSPENSION ORAL at 17:08

## 2018-02-13 NOTE — RAD
PROCEDURE:  CHEST RADIOGRAPH, 1 VIEW



HISTORY:

GI Bleeding



COMPARISON:

Comparison chest 01/28/2018



FINDINGS:



LUNGS:

Poor inspiration with low lung volumes, crowded bronchovascular 

markings and mild bibasilar atelectasis.  Interval improvement 

bilateral lower lobe infiltrates and improved vascular congestion



PLEURA:

No pneumothorax or pleural fluid seen.



CARDIOVASCULAR:

Cardiomegaly



OSSEOUS STRUCTURES:

Re- demonstrated is a small round/elliptical shaped sclerotic density 

right humeral head



VISUALIZED UPPER ABDOMEN:

Normal.



OTHER FINDINGS:

None. 



IMPRESSION:

Poor inspiration with low lung volumes, crowded bronchovascular 

markings and mild bibasilar atelectasis.  Interval improvement 

bilateral lower lobe infiltrates and improved vascular congestion

## 2018-02-13 NOTE — CARD
--------------- APPROVED REPORT --------------





EKG Measurement

Heart [id number]

[id number]

[id number]

[id number]

[id number]



<Conclusion>

Normal sinus rhythm

Possible Left atrial enlargement

Left axis deviation

Nonspecific ST abnormality

Prolonged QT

Abnormal ECG

## 2018-02-13 NOTE — C.PDOC
History Of Present Illness


[place] for coffee ground emesis. no f/c. no cp or palpitations 

dull aching abdominal pain. 


Time Seen by Provider: 02/13/18 02:27


Chief Complaint (Nursing): GI Problem


History Per: Family


History/Exam Limitations: no limitations


Onset/Duration Of Symptoms: Hrs


Current Symptoms Are (Timing): Still Present


Number Of Bleeding Episodes: Multiple:


Amount of Blood Loss: Medium


Severity: Moderate


Pain Scale Rating Of: 4


Quality Of Discomfort: Dull


Associated Symptoms: Nausea, Vomiting, Coffee Ground material


Modifying Factors: None


Recent travel outside of the [place]: No


Additional History Per: Family





Past Medical History


Reviewed: Historical Data, Nursing Documentation, Vital Signs


Vital Signs: 


 Last Vital Signs











Temp  98.4 F   02/13/18 04:20


 


Pulse  69   02/13/18 04:20


 


Resp  18   02/13/18 04:20


 


BP  130/76   02/13/18 04:20


 


Pulse Ox  100   02/13/18 05:34














- Medical History


PMH: Anemia, Diabetes, HTN, Pancreatitis


   Denies: Chronic Kidney Disease


Surgical History: Appendectomy





- [facility] Procedures








EXCISION OF DESCENDING COLON, ENDO, DIAGN (01/21/18)


EXCISION OF STOMACH, ENDO, DIAGN (01/21/18)








Family History: States: No Known Family Hx





- Social History


Hx Tobacco Use: No


Hx Alcohol Use: Yes


Hx Substance Use: No





- Immunization History


Hx Tetanus Toxoid Vaccination: No


Hx Influenza Vaccination: No


Hx Pneumococcal Vaccination: No





Review Of Systems


Constitutional: Negative for: Fever, Chills


Eyes: Negative for: Redness


ENT: Negative for: Throat Pain


Cardiovascular: Negative for: Chest Pain


Respiratory: Negative for: Shortness of Breath


Gastrointestinal: Positive for: Nausea, Vomiting, Abdominal Pain, Hematemesis


Genitourinary: Negative for: Dysuria


Musculoskeletal: Negative for: Back Pain


Skin: Positive for: Rash, Lesions


Neurological: Negative for: Weakness


Psych: Negative for: Anxiety





Physical Exam





- Physical Exam


Appears: Non-toxic, No Acute Distress


Skin: Warm, Dry


Head: Normacephalic


Eye(s): bilateral: Normal Inspection


Oral Mucosa: Dry


Neck: Supple


Chest: Symmetrical


Cardiovascular: Rhythm Regular


Respiratory: No Rales, Rhonchi, No Wheezing


Gastrointestinal/Abdominal: Bowel Sounds (tympanic to percussion), Soft, No 

Tenderness, Distention (mild)


Back: No CVA Tenderness


Extremity: Tenderness (r leg abrasion), Pedal Edema (trace)


Extremity: Bilateral: Normal Color And Temperature


Pulses: Left Dorsalis Pedis: Normal, Right Dorsalis Pedis: Normal


Neurological/Psych: Oriented x3


Gait: Unable To Assess





ED Course And Treatment





- Laboratory Results


Result Diagrams: 


 02/13/18 04:37





 02/13/18 04:37


ECG: Interpreted By Me, Viewed By Me


ECG Rhythm: Sinus Rhythm (63), Nonspecific Changes


O2 Sat by Pulse Oximetry: 100


Pulse Ox Interpretation: Normal





- Radiology


CXR: Interpreted by Me


CXR Interpretation: No: Infiltrates, Fracture, Pnemothorax





Disposition


Discussed With Dr.: Marlo Fung Jr.


Comment: accepted the pt on his service and took over the care at 5:39 AM


Doctor Will See Patient In The: ED


Counseled Patient/Family Regarding: Studies Performed, Diagnosis





- Disposition


Referrals: 


Mae Fung RN [Primary Care Provider] - 


Disposition: HOSPITALIZED


Disposition Time: 02:27


Condition: FAIR


Forms:  [facility] (English)





- POA


Present On Arrival: Poor Glycemic Control





- Clinical Impression


Clinical Impression: 


 Creatinine elevation, Upper GI bleed








Decision To Admit





- Pt Status Changed To:


Hospital Disposition Of: Inpatient





- Admit Certification


Admit to Inpatient:: After my assessment, the patient will require 

hospitalization for at least two midnights.  This is because of the severity of 

symptoms shown, intensity of services needed, and/or the medical risk in this 

patient being treated as an outpatient.





- InPatient:


Physician Admission Certification: I certify that this patient requires 2 or 

more midnights of care for the following reason:: After my assessment, the 

patient will require hospitalization for at least two midnights.  This is 

because of the severity of symptoms shown, intensity of services needed, and/or 

the medical risk in this patient being treated as an outpatient.





- .


Bed Request Type: Telemetry


Admitting Physician: Marlo Fung Jr.


Patient Diagnosis: 


 Creatinine elevation, Upper GI bleed

## 2018-02-13 NOTE — CP.PCM.HP
History of Present Illness





- History of Present Illness


History of Present Illness: 


Patient is a 67 year old male with ETOH abuse, ETOH pancreatitis, DM, HTN, who 

presents to the ED sent from the [facility] for vomiting. Patient is a poor 

historian and family is not at bedside. History obtained from ED physician. 

Patient vomited three times, the third time was described as brown, coffee 

ground emesis. Patient did not vomit while in the ED. Patient admits to 

vomiting and epigastric pain, otherwise has no other complaints.  Patient 

denies chest pain, shortness of breath, nausea, fevers, and leg pain. Patient 

is awake and confused.  





  


History per EMR


PMD: Dr. Fung 


PMHx: ETOH abuse, ETOH Pancreatitis, DM, HTN 


PSHx: appendectomy 


Family hx: Both parents , father with DM.  


Social hx: Drinks about 6-9 shots of vodka daily. Last drink was yesterday. 

Former smoker, quit 20 yrs ago, denies drug use. Lives with family, retired.  


Allergies: NKDA.  


Medications: Glimiperide 4 mg PO BID, Lisinopril 40 mg PO daily, Lasix 40 mg PO 

daily, Propranalol 20 mg PO TID, KCl 20 mg PO BID, Humulin N sliding scale. See 

EMR.





Present on Admission





- Present on Admission


Any Indicators Present on Admission: No





Review of Systems





- Review of Systems


Systems not reviewed;Unavailable: Uncooperative





- Constitutional


Constitutional: absent: Fever, Headache





- Cardiovascular


Cardiovascular: absent: Chest Pain, Dyspnea





- Respiratory


Respiratory: absent: Dyspnea





- Gastrointestinal


Gastrointestinal: Abdominal Pain (Epigastric), Vomiting.  absent: Constipation, 

Diarrhea, Nausea





Past Patient History





- Infectious Disease


Hx of Infectious Diseases: None





- Past Medical History & Family History


Past Medical History?: Yes





- Past Social History


Smoking Status: Never Smoked





- CARDIAC


Hx Hypertension: Yes





- PULMONARY


Hx Respiratory Disorders: No





- NEUROLOGICAL


Hx Neurological Disorder: No





- HEENT


Hx HEENT Problems: No





- RENAL


Hx Chronic Kidney Disease: No





- ENDOCRINE/METABOLIC


Hx Diabetes Mellitus Type 1: Yes


Hx Diabetes Mellitus Type 2: Yes





- HEMATOLOGICAL/ONCOLOGICAL


Hx Anemia: Yes





- INTEGUMENTARY


Hx Dermatological Problems: No





- MUSCULOSKELETAL/RHEUMATOLOGICAL


Hx Musculoskeletal Disorders: No


Hx Falls: No





- GASTROINTESTINAL


Hx Pancreatitis: Yes





- GENITOURINARY/GYNECOLOGICAL


Hx Genitourinary Disorders: No





- PSYCHIATRIC


Hx Substance Use: No





- SURGICAL HISTORY


Hx Appendectomy: Yes





- ANESTHESIA


Hx Anesthesia: Yes


Hx Anesthesia Reactions: No





Meds


Allergies/Adverse Reactions: 


 Allergies











Allergy/AdvReac Type Severity Reaction Status Date / Time


 


No Known Allergies Allergy   Verified 18 02:12














Physical Exam





- Constitutional


Appears: No Acute Distress





- Head Exam


Head Exam: NORMAL INSPECTION





- Eye Exam


Eye Exam: EOMI, Normal appearance





- ENT Exam


ENT Exam: Mucous Membranes Moist





- Respiratory Exam


Respiratory Exam: Rales (right, lateral), Wheezes (mild, right anterior), 

NORMAL BREATHING PATTERN.  absent: Respiratory Distress





- Cardiovascular Exam


Cardiovascular Exam: REGULAR RHYTHM, +S1, +S2





- GI/Abdominal Exam


GI & Abdominal Exam: Normal Bowel Sounds, Soft, Tenderness (epigastric).  absent

: Distended, Firm, Guarding





- Extremities Exam


Extremities exam: Positive for: pedal edema (pitting edema b/l LE).  Negative 

for: tenderness


Additional comments: 


Gauze placed on right calf- clean, dry, intact





- Psychiatric Exam


Psychiatric exam: Normal Affect, Normal Mood





- Skin


Skin Exam: Dry, Intact, Normal Color, Warm





Results





- Vital Signs


Recent Vital Signs: 





 Last Vital Signs











Temp  98.4 F   18 04:20


 


Pulse  69   18 04:20


 


Resp  18   18 04:20


 


BP  130/76   18 04:20


 


Pulse Ox  100   18 05:41














- Labs


Result Diagrams: 


 18 04:37





 18 04:37


Labs: 





 Laboratory Results - last 24 hr











  18





  04:37 04:37 04:37


 


WBC  10.3  D  


 


RBC  3.63 L  


 


Hgb  10.4 L  


 


Hct  31.7 L  


 


MCV  87.3  


 


MCH  28.8  


 


MCHC  33.0  


 


RDW  21.5 H  


 


Plt Count  197  


 


MPV  10.0  


 


Neut % (Auto)  85.1 H  


 


Lymph % (Auto)  8.5 L  


 


Mono % (Auto)  3.2  


 


Eos % (Auto)  2.3  


 


Baso % (Auto)  0.9  


 


Neut # (Auto)  8.8 H  


 


Lymph # (Auto)  0.9 L  


 


Mono # (Auto)  0.3  


 


Eos # (Auto)  0.2  


 


Baso # (Auto)  0.1  


 


Neutrophils % (Manual)  87 H  


 


Band Neutrophils %  1  


 


Lymphocytes % (Manual)  7 L  


 


Monocytes % (Manual)  4  


 


Eosinophils % (Manual)  1  


 


Platelet Estimate  Normal  


 


PT   13.8 H 


 


INR   1.2 


 


APTT   38 H 


 


Sodium    136


 


Potassium    3.5 L


 


Chloride    95 L


 


Carbon Dioxide    23


 


Anion Gap    22 H


 


BUN    39 H


 


Creatinine    4.6 H


 


Est GFR ( Amer)    15


 


Est GFR (Non-Af Amer)    13


 


Random Glucose    221 H


 


Calcium    9.2


 


Total Bilirubin    1.0


 


AST    41


 


ALT    34


 


Alkaline Phosphatase    131 H D


 


Total Protein    8.1


 


Albumin    3.8


 


Globulin    4.3 H


 


Albumin/Globulin Ratio    0.9 L


 


Stool Occult Blood   


 


Serum Ketones    Small


 


Blood Type   


 


Antibody Screen   














  18





  04:37 06:37


 


WBC  


 


RBC  


 


Hgb  


 


Hct  


 


MCV  


 


MCH  


 


MCHC  


 


RDW  


 


Plt Count  


 


MPV  


 


Neut % (Auto)  


 


Lymph % (Auto)  


 


Mono % (Auto)  


 


Eos % (Auto)  


 


Baso % (Auto)  


 


Neut # (Auto)  


 


Lymph # (Auto)  


 


Mono # (Auto)  


 


Eos # (Auto)  


 


Baso # (Auto)  


 


Neutrophils % (Manual)  


 


Band Neutrophils %  


 


Lymphocytes % (Manual)  


 


Monocytes % (Manual)  


 


Eosinophils % (Manual)  


 


Platelet Estimate  


 


PT  


 


INR  


 


APTT  


 


Sodium  


 


Potassium  


 


Chloride  


 


Carbon Dioxide  


 


Anion Gap  


 


BUN  


 


Creatinine  


 


Est GFR ( Amer)  


 


Est GFR (Non-Af Amer)  


 


Random Glucose  


 


Calcium  


 


Total Bilirubin  


 


AST  


 


ALT  


 


Alkaline Phosphatase  


 


Total Protein  


 


Albumin  


 


Globulin  


 


Albumin/Globulin Ratio  


 


Stool Occult Blood   Negative


 


Serum Ketones  


 


Blood Type  O POSITIVE 


 


Antibody Screen  Negative 














Assessment & Plan


(1) Upper GI bleed


Assessment and Plan: 


GI consulted, Dr. Quintanilla; recs appreciated


In the ED, protonix,  zofran, and IVF were given


Endoscopy (18) showed candidiasis esophagitis, gastric ulcer (biopsied); 

erythematous mucosa in prepyloric region of stomach and erythematous 

duodenopathy. 


Colonscopy (18) showed colitis, diverticulitis, internal hemorrhoids, and 

chronic inflammation. 


Protonix drip


NS@120mls/hr


HOLD chemical anticoagilation


NPO


Status: Acute   





(2) NITISH (acute kidney injury)


Assessment and Plan: 


At admission: BUN/Cr 39/4.6


NS@120mls/hr


Continue to monitor


Status: Acute   





(3) Diabetes


Assessment and Plan: 


Holding home medications


ISS- medium


Hypoglycemia protocol


Monitor blood glucose, accuchecks


A1c (2017): 7.6


Status: Chronic   





(4) HTN (hypertension)


Assessment and Plan: 


Monitor vitals


Continue home medication: Lisinopril 40mg PO daily, Propanolol 20mg PO TID


Status: Chronic   





(5) Loose stools


Assessment and Plan: 


Loose stool noted during examination


Stool studies: f/u results


C. Diff: f/u results


Stool occult: f/u results


Status: Acute   





(6) Prophylactic measure


Assessment and Plan: 


Hold chemical anticoagulation; SCDs


Protonix Drip


Fall precaution


1:1 obv


O2 via nasal cannula prn


Status: Acute

## 2018-02-13 NOTE — CP.PCM.CON
<Mik Carroll - Last Filed: 18 18:07>





History of Present Illness





- History of Present Illness


History of Present Illness: 





ICU Consult Note





Patient is a 67 year old male with ETOH abuse, ETOH pancreatitis, DM, HTN, who 

presents to the ED sent from the [facility] for vomiting. Patient is a poor 

historian and family is not at bedside. History obtained from ED physician. 

Patient vomited three times, the third time was described as brown, coffee 

ground emesis. Patient did not vomit while in the ED. Of note the patient was 

recently discharged from [facility] with the diagnosis of acute 

pancreatitis.  He was discharged on Zosyn.Patient admits to vomiting and 

epigastric pain, otherwise has no other complaints.  Patient denies chest pain, 

shortness of breath, nausea, fevers, and leg pain. Patient is awake and 

confused.  





  


History per EMR


PMD: Dr. Fung 


PMHx: ETOH abuse, ETOH Pancreatitis, DM, HTN 


PSHx: appendectomy 


Family hx: Both parents , father with DM.  


Social hx: Drinks about 6-9 shots of vodka daily. Last drink was yesterday. 

Former smoker, quit 20 yrs ago, denies drug use. Lives with family, retired.  


Allergies: NKDA.  


Medications: Glimiperide 4 mg PO BID, Lisinopril 40 mg PO daily, Lasix 40 mg PO 

daily, Propranalol 20 mg PO TID, KCl 20 mg PO BID, Humulin N sliding scale. See 

EMR.








Past Patient History





- Infectious Disease


Hx of Infectious Diseases: None





- Past Medical History & Family History


Past Medical History?: Yes





- Past Social History


Smoking Status: Never Smoked





- CARDIAC


Hx Hypertension: Yes





- PULMONARY


Hx Respiratory Disorders: No





- NEUROLOGICAL


Hx Neurological Disorder: No





- HEENT


Hx HEENT Problems: No





- RENAL


Hx Chronic Kidney Disease: No





- ENDOCRINE/METABOLIC


Hx Diabetes Mellitus Type 1: Yes


Hx Diabetes Mellitus Type 2: Yes





- HEMATOLOGICAL/ONCOLOGICAL


Hx Anemia: Yes





- INTEGUMENTARY


Hx Dermatological Problems: No





- MUSCULOSKELETAL/RHEUMATOLOGICAL


Hx Musculoskeletal Disorders: No


Hx Falls: No





- GASTROINTESTINAL


Hx Pancreatitis: Yes





- GENITOURINARY/GYNECOLOGICAL


Hx Genitourinary Disorders: No





- PSYCHIATRIC


Hx Substance Use: No





- SURGICAL HISTORY


Hx Appendectomy: Yes





- ANESTHESIA


Hx Anesthesia: Yes


Hx Anesthesia Reactions: No





Meds


Allergies/Adverse Reactions: 


 Allergies











Allergy/AdvReac Type Severity Reaction Status Date / Time


 


No Known Allergies Allergy   Verified 18 02:12














- Medications


Medications: 


 Current Medications





Artificial Tears (Artificial Tears)  0 ml OD DAILY [facility]


Folic Acid (Folic Acid)  1 mg PO DAILY [facility]


   Last Admin: 18 10:10 Dose:  Not Given


Sodium Chloride (Sodium Chloride 0.9%)  1,000 mls @ 120 mls/hr IV .Q8H20M [facility]


   Last Admin: 18 07:49 Dose:  120 mls/hr


Pantoprazole Sodium 80 mg/ (Sodium Chloride)  100 mls @ 10 mls/hr IVPB .Q10H [facility]


   PRN Reason: 8 MG/HR


   Last Admin: 18 09:10 Dose:  10 mls/hr


Sodium Chloride (Sodium Chloride 0.9%)  1,000 mls @ 1,000 mls/hr IV .Q1H [facility]


   Stop: 18 15:52


Sodium Chloride (Sodium Chloride 0.9%)  1,000 mls @ 1,000 mls/hr IV .Q1H [facility]


   Stop: 18 15:52


Piperacillin Sod/Tazobactam Sod (Zosyn 2.25 Gm Iv Premix)  2.25 gm in 50 mls @ 

100 mls/hr IVPB Q8H [facility]


Insulin Human Regular (Novolin R)  0 unit SC ACHS [facility]


   PRN Reason: Protocol


   Last Admin: 18 12:10 Dose:  3 unit


Multivitamins/Vitamin C (Multi-Delyn Liquid)  5 ml PO DAILY [facility]


   Last Admin: 18 10:10 Dose:  Not Given


Ondansetron HCl (Zofran Inj)  4 mg IVP Q6 [facility]


Potassium Chloride (K-Dur 20 Meq Er Tab)  40 meq PO DAILY [facility]


   Last Admin: 18 10:10 Dose:  Not Given


Propranolol HCl (Inderal)  20 mg PO TID [facility]


   Last Admin: 18 10:10 Dose:  Not Given


Sucralfate (Carafate Oral Susp)  1 gm PO ACHS [facility]


   Last Admin: 18 12:30 Dose:  1 gm


Thiamine HCl (Vitamin B1 Tab)  100 mg PO DAILY [facility]


   Last Admin: 18 10:10 Dose:  Not Given











Physical Exam





- Head Exam


Head Exam: ATRAUMATIC, NORMAL INSPECTION, NORMOCEPHALIC





- Eye Exam


Eye Exam: EOMI, Normal appearance, PERRL


Pupil Exam: NORMAL ACCOMODATION, PERRL





- ENT Exam


ENT Exam: Mucous Membranes Moist, Normal Oropharynx





- Neck Exam


Neck exam: Negative for: Lymphadenopathy, Thyromegaly





- Respiratory Exam


Respiratory Exam: Clear to Auscultation Bilateral, NORMAL BREATHING PATTERN





- Cardiovascular Exam


Cardiovascular Exam: REGULAR RHYTHM, +S1, +S2





- GI/Abdominal Exam


GI & Abdominal Exam: Normal Bowel Sounds, Soft





- Extremities Exam


Extremities exam: Positive for: normal inspection





- Back Exam


Back exam: NORMAL INSPECTION.  absent: paraspinal tenderness





- Neurological Exam


Neurological exam: Alert, CN II-XII Intact, Oriented x3





- Psychiatric Exam


Psychiatric exam: Normal Affect, Normal Mood





- Skin


Skin Exam: Dry, Intact





Results





- Vital Signs


Recent Vital Signs: 


 Last Vital Signs











Temp  95.5 F L  18 14:00


 


Pulse  65   18 14:00


 


Resp  20   18 14:00


 


BP  101/52 L  18 14:00


 


Pulse Ox  97   18 14:00














- Labs


Result Diagrams: 


 18 04:37





 18 04:37


Labs: 


 Laboratory Results - last 24 hr











  18





  04:37 04:37 04:37


 


WBC  10.3  D  


 


RBC  3.63 L  


 


Hgb  10.4 L  


 


Hct  31.7 L  


 


MCV  87.3  


 


MCH  28.8  


 


MCHC  33.0  


 


RDW  21.5 H  


 


Plt Count  197  


 


MPV  10.0  


 


Neut % (Auto)  85.1 H  


 


Lymph % (Auto)  8.5 L  


 


Mono % (Auto)  3.2  


 


Eos % (Auto)  2.3  


 


Baso % (Auto)  0.9  


 


Neut # (Auto)  8.8 H  


 


Lymph # (Auto)  0.9 L  


 


Mono # (Auto)  0.3  


 


Eos # (Auto)  0.2  


 


Baso # (Auto)  0.1  


 


Neutrophils % (Manual)  87 H  


 


Band Neutrophils %  1  


 


Lymphocytes % (Manual)  7 L  


 


Monocytes % (Manual)  4  


 


Eosinophils % (Manual)  1  


 


Platelet Estimate  Normal  


 


PT   13.8 H 


 


INR   1.2 


 


APTT   38 H 


 


Sodium    136


 


Potassium    3.5 L


 


Chloride    95 L


 


Carbon Dioxide    23


 


Anion Gap    22 H


 


BUN    39 H


 


Creatinine    4.6 H


 


Est GFR ( Amer)    15


 


Est GFR (Non-Af Amer)    13


 


POC Glucose (mg/dL)   


 


Random Glucose    221 H


 


Calcium    9.2


 


Total Bilirubin    1.0


 


AST    41


 


ALT    34


 


Alkaline Phosphatase    131 H D


 


Total Protein    8.1


 


Albumin    3.8


 


Globulin    4.3 H


 


Albumin/Globulin Ratio    0.9 L


 


Urine Color   


 


Urine Clarity   


 


Urine pH   


 


Ur Specific Gravity   


 


Urine Protein   


 


Urine Glucose (UA)   


 


Urine Ketones   


 


Urine Blood   


 


Urine Nitrate   


 


Urine Bilirubin   


 


Urine Urobilinogen   


 


Ur Leukocyte Esterase   


 


Urine WBC (Auto)   


 


Urine RBC (Auto)   


 


Ur Squamous Epith Cells   


 


Urine Bacteria   


 


Hyaline Casts   


 


Stool Occult Blood   


 


Serum Ketones    Small


 


C. difficile Ag & Toxin   


 


Blood Type   


 


Antibody Screen   














  18





  04:37 06:37 08:37


 


WBC   


 


RBC   


 


Hgb   


 


Hct   


 


MCV   


 


MCH   


 


MCHC   


 


RDW   


 


Plt Count   


 


MPV   


 


Neut % (Auto)   


 


Lymph % (Auto)   


 


Mono % (Auto)   


 


Eos % (Auto)   


 


Baso % (Auto)   


 


Neut # (Auto)   


 


Lymph # (Auto)   


 


Mono # (Auto)   


 


Eos # (Auto)   


 


Baso # (Auto)   


 


Neutrophils % (Manual)   


 


Band Neutrophils %   


 


Lymphocytes % (Manual)   


 


Monocytes % (Manual)   


 


Eosinophils % (Manual)   


 


Platelet Estimate   


 


PT   


 


INR   


 


APTT   


 


Sodium   


 


Potassium   


 


Chloride   


 


Carbon Dioxide   


 


Anion Gap   


 


BUN   


 


Creatinine   


 


Est GFR ( Amer)   


 


Est GFR (Non-Af Amer)   


 


POC Glucose (mg/dL)    268 H


 


Random Glucose   


 


Calcium   


 


Total Bilirubin   


 


AST   


 


ALT   


 


Alkaline Phosphatase   


 


Total Protein   


 


Albumin   


 


Globulin   


 


Albumin/Globulin Ratio   


 


Urine Color   


 


Urine Clarity   


 


Urine pH   


 


Ur Specific Gravity   


 


Urine Protein   


 


Urine Glucose (UA)   


 


Urine Ketones   


 


Urine Blood   


 


Urine Nitrate   


 


Urine Bilirubin   


 


Urine Urobilinogen   


 


Ur Leukocyte Esterase   


 


Urine WBC (Auto)   


 


Urine RBC (Auto)   


 


Ur Squamous Epith Cells   


 


Urine Bacteria   


 


Hyaline Casts   


 


Stool Occult Blood   Negative 


 


Serum Ketones   


 


C. difficile Ag & Toxin   


 


Blood Type  O POSITIVE  


 


Antibody Screen  Negative  














  18





  09:00 11:44 14:05


 


WBC   


 


RBC   


 


Hgb   


 


Hct   


 


MCV   


 


MCH   


 


MCHC   


 


RDW   


 


Plt Count   


 


MPV   


 


Neut % (Auto)   


 


Lymph % (Auto)   


 


Mono % (Auto)   


 


Eos % (Auto)   


 


Baso % (Auto)   


 


Neut # (Auto)   


 


Lymph # (Auto)   


 


Mono # (Auto)   


 


Eos # (Auto)   


 


Baso # (Auto)   


 


Neutrophils % (Manual)   


 


Band Neutrophils %   


 


Lymphocytes % (Manual)   


 


Monocytes % (Manual)   


 


Eosinophils % (Manual)   


 


Platelet Estimate   


 


PT   


 


INR   


 


APTT   


 


Sodium   


 


Potassium   


 


Chloride   


 


Carbon Dioxide   


 


Anion Gap   


 


BUN   


 


Creatinine   


 


Est GFR ( Amer)   


 


Est GFR (Non-Af Amer)   


 


POC Glucose (mg/dL)   241 H 


 


Random Glucose   


 


Calcium   


 


Total Bilirubin   


 


AST   


 


ALT   


 


Alkaline Phosphatase   


 


Total Protein   


 


Albumin   


 


Globulin   


 


Albumin/Globulin Ratio   


 


Urine Color    Yellow


 


Urine Clarity    Hazy


 


Urine pH    5.0


 


Ur Specific Gravity    1.017


 


Urine Protein    2+ H


 


Urine Glucose (UA)    2+ H


 


Urine Ketones    Trace


 


Urine Blood    2+ H


 


Urine Nitrate    Negative


 


Urine Bilirubin    Negative


 


Urine Urobilinogen    Normal


 


Ur Leukocyte Esterase    1+ H


 


Urine WBC (Auto)    7 H


 


Urine RBC (Auto)    16 H


 


Ur Squamous Epith Cells    6 H


 


Urine Bacteria    Rare


 


Hyaline Casts    3-5 H


 


Stool Occult Blood   


 


Serum Ketones   


 


C. difficile Ag & Toxin    


 


Blood Type   


 


Antibody Screen   














Assessment & Plan





- Assessment and Plan (Free Text)


Assessment: 





Patient is a 67 year old male with ETOH abuse, ETOH pancreatitis, DM, HTN, who 

is being admitted to ICU for acute renal failure.








Plan: 





Cardiology (Hypertension)


-Lasix and Lisinopril held at this time.





Endocrinology (Diabetes)


-[facility]


[facility]





Gastroenterology (GI bleed)


-GI consulted, Dr. Quintanilla; recs appreciated


-Endoscopy (18) showed candidiasis esophagitis, gastric ulcer (biopsied); 

erythematous mucosa in prepyloric region of stomach and erythematous 

duodenopathy. 


-Colonscopy (18) showed colitis, diverticulitis, internal hemorrhoids, and 

chronic inflammation. 


-Protonix drip


-NS@120mls/hr





Nephrology(ARF)


-IV fluids NS Bolus


-Zestril and Lasix held as it could be contributing to Acute Kidney Injury


-Zosyn (renal dosing)





PPX


-Folic acid


-Multivitamin


-Thiamine


-Protonix


-Heparin











<Cesar Perry - Last Filed: 18 18:25>





Meds





- Medications


Medications: 


 Current Medications





Artificial Tears (Artificial Tears)  0 ml OD DAILY [facility]


   Last Admin: 18 17:10 Dose:  1 drop


Folic Acid (Folic Acid)  1 mg PO DAILY [facility]


   Last Admin: 18 10:10 Dose:  Not Given


Sodium Chloride (Sodium Chloride 0.9%)  1,000 mls @ 120 mls/hr IV .Q8H20M [facility]


   Last Admin: 18 16:56 Dose:  120 mls/hr


Pantoprazole Sodium 80 mg/ (Sodium Chloride)  100 mls @ 10 mls/hr IVPB .Q10H [facility]


   PRN Reason: 8 MG/HR


   Last Admin: 18 09:10 Dose:  10 mls/hr


Piperacillin Sod/Tazobactam Sod (Zosyn 2.25 Gm Iv Premix)  2.25 gm in 50 mls @ 

100 mls/hr IVPB Q8H [facility]


   Last Admin: 18 16:00 Dose:  100 mls/hr


Insulin Human Regular (Novolin R)  [facility]


   PRN Reason: Protocol


   Last Admin: 18 17:09 Dose:  3 unit


Multivitamins/Vitamin C (Multi-Delyn Liquid)  5 ml PO DAILY [facility]


   Last Admin: 18 10:10 Dose:  Not Given


Ondansetron HCl (Zofran Inj)  4 mg IVP Q6 [facility]


   Last Admin: 18 12:00 Dose:  4 mg


Potassium Chloride (K-Dur 20 Meq Er Tab)  40 meq PO DAILY [facility]


   Last Admin: 18 10:10 Dose:  Not Given


Propranolol HCl (Inderal)  20 mg PO TID [facility]


   Last Admin: 18 15:17 Dose:  Not Given


Sucralfate (Carafate Oral Susp)  1 gm PO ACHS [facility]


   Last Admin: 18 17:08 Dose:  1 gm


Thiamine HCl (Vitamin B1 Tab)  100 mg PO DAILY [facility]


   Last Admin: 18 10:10 Dose:  Not Given











Results





- Vital Signs


Recent Vital Signs: 


 Last Vital Signs











Temp  97.2 F L  18 16:45


 


Pulse  75   18 17:04


 


Resp  20   18 17:04


 


BP  126/69   18 17:04


 


Pulse Ox  98   18 17:04














- Labs


Result Diagrams: 


 18 04:37





 18 04:37


Labs: 


 Laboratory Results - last 24 hr











  18





  04:37 04:37 04:37


 


WBC  10.3  D  


 


RBC  3.63 L  


 


Hgb  10.4 L  


 


Hct  31.7 L  


 


MCV  87.3  


 


MCH  28.8  


 


MCHC  33.0  


 


RDW  21.5 H  


 


Plt Count  197  


 


MPV  10.0  


 


Neut % (Auto)  85.1 H  


 


Lymph % (Auto)  8.5 L  


 


Mono % (Auto)  3.2  


 


Eos % (Auto)  2.3  


 


Baso % (Auto)  0.9  


 


Neut # (Auto)  8.8 H  


 


Lymph # (Auto)  0.9 L  


 


Mono # (Auto)  0.3  


 


Eos # (Auto)  0.2  


 


Baso # (Auto)  0.1  


 


Neutrophils % (Manual)  87 H  


 


Band Neutrophils %  1  


 


Lymphocytes % (Manual)  7 L  


 


Monocytes % (Manual)  4  


 


Eosinophils % (Manual)  1  


 


Platelet Estimate  Normal  


 


PT   13.8 H 


 


INR   1.2 


 


APTT   38 H 


 


Sodium    136


 


Potassium    3.5 L


 


Chloride    95 L


 


Carbon Dioxide    23


 


Anion Gap    22 H


 


BUN    39 H


 


Creatinine    4.6 H


 


Est GFR ( Amer)    15


 


Est GFR (Non-Af Amer)    13


 


POC Glucose (mg/dL)   


 


Random Glucose    221 H


 


Calcium    9.2


 


Total Bilirubin    1.0


 


AST    41


 


ALT    34


 


Alkaline Phosphatase    131 H D


 


Total Protein    8.1


 


Albumin    3.8


 


Globulin    4.3 H


 


Albumin/Globulin Ratio    0.9 L


 


Urine Color   


 


Urine Clarity   


 


Urine pH   


 


Ur Specific Gravity   


 


Urine Protein   


 


Urine Glucose (UA)   


 


Urine Ketones   


 


Urine Blood   


 


Urine Nitrate   


 


Urine Bilirubin   


 


Urine Urobilinogen   


 


Ur Leukocyte Esterase   


 


Urine WBC (Auto)   


 


Urine RBC (Auto)   


 


Ur Squamous Epith Cells   


 


Urine Bacteria   


 


Hyaline Casts   


 


Stool Occult Blood   


 


Serum Ketones    Small


 


C. difficile Ag & Toxin   


 


Blood Type   


 


Antibody Screen   














  18





  04:37 06:37 08:37


 


WBC   


 


RBC   


 


Hgb   


 


Hct   


 


MCV   


 


MCH   


 


MCHC   


 


RDW   


 


Plt Count   


 


MPV   


 


Neut % (Auto)   


 


Lymph % (Auto)   


 


Mono % (Auto)   


 


Eos % (Auto)   


 


Baso % (Auto)   


 


Neut # (Auto)   


 


Lymph # (Auto)   


 


Mono # (Auto)   


 


Eos # (Auto)   


 


Baso # (Auto)   


 


Neutrophils % (Manual)   


 


Band Neutrophils %   


 


Lymphocytes % (Manual)   


 


Monocytes % (Manual)   


 


Eosinophils % (Manual)   


 


Platelet Estimate   


 


PT   


 


INR   


 


APTT   


 


Sodium   


 


Potassium   


 


Chloride   


 


Carbon Dioxide   


 


Anion Gap   


 


BUN   


 


Creatinine   


 


Est GFR ( Amer)   


 


Est GFR (Non-Af Amer)   


 


POC Glucose (mg/dL)    268 H


 


Random Glucose   


 


Calcium   


 


Total Bilirubin   


 


AST   


 


ALT   


 


Alkaline Phosphatase   


 


Total Protein   


 


Albumin   


 


Globulin   


 


Albumin/Globulin Ratio   


 


Urine Color   


 


Urine Clarity   


 


Urine pH   


 


Ur Specific Gravity   


 


Urine Protein   


 


Urine Glucose (UA)   


 


Urine Ketones   


 


Urine Blood   


 


Urine Nitrate   


 


Urine Bilirubin   


 


Urine Urobilinogen   


 


Ur Leukocyte Esterase   


 


Urine WBC (Auto)   


 


Urine RBC (Auto)   


 


Ur Squamous Epith Cells   


 


Urine Bacteria   


 


Hyaline Casts   


 


Stool Occult Blood   Negative 


 


Serum Ketones   


 


C. difficile Ag & Toxin   


 


Blood Type  O POSITIVE  


 


Antibody Screen  Negative  














  18





  09:00 11:44 14:05


 


WBC   


 


RBC   


 


Hgb   


 


Hct   


 


MCV   


 


MCH   


 


MCHC   


 


RDW   


 


Plt Count   


 


MPV   


 


Neut % (Auto)   


 


Lymph % (Auto)   


 


Mono % (Auto)   


 


Eos % (Auto)   


 


Baso % (Auto)   


 


Neut # (Auto)   


 


Lymph # (Auto)   


 


Mono # (Auto)   


 


Eos # (Auto)   


 


Baso # (Auto)   


 


Neutrophils % (Manual)   


 


Band Neutrophils %   


 


Lymphocytes % (Manual)   


 


Monocytes % (Manual)   


 


Eosinophils % (Manual)   


 


Platelet Estimate   


 


PT   


 


INR   


 


APTT   


 


Sodium   


 


Potassium   


 


Chloride   


 


Carbon Dioxide   


 


Anion Gap   


 


BUN   


 


Creatinine   


 


Est GFR ( Amer)   


 


Est GFR (Non-Af Amer)   


 


POC Glucose (mg/dL)   241 H 


 


Random Glucose   


 


Calcium   


 


Total Bilirubin   


 


AST   


 


ALT   


 


Alkaline Phosphatase   


 


Total Protein   


 


Albumin   


 


Globulin   


 


Albumin/Globulin Ratio   


 


Urine Color    Yellow


 


Urine Clarity    Hazy


 


Urine pH    5.0


 


Ur Specific Gravity    1.017


 


Urine Protein    2+ H


 


Urine Glucose (UA)    2+ H


 


Urine Ketones    Trace


 


Urine Blood    2+ H


 


Urine Nitrate    Negative


 


Urine Bilirubin    Negative


 


Urine Urobilinogen    Normal


 


Ur Leukocyte Esterase    1+ H


 


Urine WBC (Auto)    7 H


 


Urine RBC (Auto)    16 H


 


Ur Squamous Epith Cells    6 H


 


Urine Bacteria    Rare


 


Hyaline Casts    3-5 H


 


Stool Occult Blood   


 


Serum Ketones   


 


C. difficile Ag & Toxin    


 


Blood Type   


 


Antibody Screen   














  18





  16:58


 


WBC 


 


RBC 


 


Hgb 


 


Hct 


 


MCV 


 


MCH 


 


MCHC 


 


RDW 


 


Plt Count 


 


MPV 


 


Neut % (Auto) 


 


Lymph % (Auto) 


 


Mono % (Auto) 


 


Eos % (Auto) 


 


Baso % (Auto) 


 


Neut # (Auto) 


 


Lymph # (Auto) 


 


Mono # (Auto) 


 


Eos # (Auto) 


 


Baso # (Auto) 


 


Neutrophils % (Manual) 


 


Band Neutrophils % 


 


Lymphocytes % (Manual) 


 


Monocytes % (Manual) 


 


Eosinophils % (Manual) 


 


Platelet Estimate 


 


PT 


 


INR 


 


APTT 


 


Sodium 


 


Potassium 


 


Chloride 


 


Carbon Dioxide 


 


Anion Gap 


 


BUN 


 


Creatinine 


 


Est GFR ( Amer) 


 


Est GFR (Non-Af Amer) 


 


POC Glucose (mg/dL)  213 H


 


Random Glucose 


 


Calcium 


 


Total Bilirubin 


 


AST 


 


ALT 


 


Alkaline Phosphatase 


 


Total Protein 


 


Albumin 


 


Globulin 


 


Albumin/Globulin Ratio 


 


Urine Color 


 


Urine Clarity 


 


Urine pH 


 


Ur Specific Gravity 


 


Urine Protein 


 


Urine Glucose (UA) 


 


Urine Ketones 


 


Urine Blood 


 


Urine Nitrate 


 


Urine Bilirubin 


 


Urine Urobilinogen 


 


Ur Leukocyte Esterase 


 


Urine WBC (Auto) 


 


Urine RBC (Auto) 


 


Ur Squamous Epith Cells 


 


Urine Bacteria 


 


Hyaline Casts 


 


Stool Occult Blood 


 


Serum Ketones 


 


C. difficile Ag & Toxin 


 


Blood Type 


 


Antibody Screen 














Attending/Attestation





- Attestation


I have personally seen and examined this patient.: Yes


I have fully participated in the care of the patient.: Yes


I have reviewed all pertinent clinical information: Yes


Notes (Text): 





18 18:23


asked to evaluate the patient for hypotension and hypothermia


67-year-old male transferredfrom nursing home for vomiting


Patient is awake nd responsive


Patient found to haveelevated BUN/creatinine which is significantly different 

from previous admission


Patient was discharged home on Lisinopril. And Lasix most likely because 

ofdehydration and renal failure


Fluid resuscitation


Started on IV antibiotics


Follow-up culture and sensitivity


ICU observation


Urine output

## 2018-02-14 LAB
ALBUMIN SERPL-MCNC: 2.7 G/DL (ref 3.5–5)
ALBUMIN/GLOB SERPL: 0.8 {RATIO} (ref 1–2.1)
ALT SERPL-CCNC: 24 U/L (ref 21–72)
AST SERPL-CCNC: 22 U/L (ref 17–59)
BASOPHILS # BLD AUTO: 0.1 K/UL (ref 0–0.2)
BASOPHILS NFR BLD: 1.1 % (ref 0–2)
BUN SERPL-MCNC: 37 MG/DL (ref 9–20)
CALCIUM SERPL-MCNC: 7.6 MG/DL (ref 8.6–10.4)
EOSINOPHIL # BLD AUTO: 0.2 K/UL (ref 0–0.7)
EOSINOPHIL NFR BLD: 3.5 % (ref 0–4)
ERYTHROCYTE [DISTWIDTH] IN BLOOD BY AUTOMATED COUNT: 21 % (ref 11.5–14.5)
GFR NON-AFRICAN AMERICAN: 16
HGB BLD-MCNC: 8.5 G/DL (ref 12–18)
LYMPHOCYTES # BLD AUTO: 1.1 K/UL (ref 1–4.3)
LYMPHOCYTES NFR BLD AUTO: 16.2 % (ref 20–40)
MCH RBC QN AUTO: 28.7 PG (ref 27–31)
MCHC RBC AUTO-ENTMCNC: 32.6 G/DL (ref 33–37)
MCV RBC AUTO: 87.9 FL (ref 80–94)
MONOCYTES # BLD: 0.3 K/UL (ref 0–0.8)
MONOCYTES NFR BLD: 5.1 % (ref 0–10)
NEUTROPHILS # BLD: 4.9 K/UL (ref 1.8–7)
NEUTROPHILS NFR BLD AUTO: 74.1 % (ref 50–75)
NRBC BLD AUTO-RTO: 0 % (ref 0–2)
PLATELET # BLD: 141 K/UL (ref 130–400)
PMV BLD AUTO: 10.1 FL (ref 7.2–11.7)
RBC # BLD AUTO: 2.97 MIL/UL (ref 4.4–5.9)
WBC # BLD AUTO: 6.7 K/UL (ref 4.8–10.8)

## 2018-02-14 RX ADMIN — HUMAN INSULIN SCH UNIT: 100 INJECTION, SOLUTION SUBCUTANEOUS at 08:09

## 2018-02-14 RX ADMIN — POTASSIUM CHLORIDE SCH MEQ: 20 TABLET, EXTENDED RELEASE ORAL at 10:01

## 2018-02-14 RX ADMIN — HUMAN INSULIN SCH: 100 INJECTION, SOLUTION SUBCUTANEOUS at 21:25

## 2018-02-14 RX ADMIN — Medication SCH TAB: at 10:00

## 2018-02-14 RX ADMIN — TAZOBACTAM SODIUM AND PIPERACILLIN SODIUM SCH MLS/HR: 250; 2 INJECTION, SOLUTION INTRAVENOUS at 22:43

## 2018-02-14 RX ADMIN — HUMAN INSULIN SCH UNIT: 100 INJECTION, SOLUTION SUBCUTANEOUS at 12:43

## 2018-02-14 RX ADMIN — TAZOBACTAM SODIUM AND PIPERACILLIN SODIUM SCH MLS/HR: 250; 2 INJECTION, SOLUTION INTRAVENOUS at 15:47

## 2018-02-14 RX ADMIN — TAZOBACTAM SODIUM AND PIPERACILLIN SODIUM SCH MLS/HR: 250; 2 INJECTION, SOLUTION INTRAVENOUS at 06:18

## 2018-02-14 RX ADMIN — HUMAN INSULIN SCH UNIT: 100 INJECTION, SOLUTION SUBCUTANEOUS at 17:28

## 2018-02-14 RX ADMIN — SUCRALFATE SCH GM: 1 SUSPENSION ORAL at 12:44

## 2018-02-14 RX ADMIN — POLYVINYL ALCOHOL SCH DROP: 14 SOLUTION/ DROPS OPHTHALMIC at 09:59

## 2018-02-14 RX ADMIN — SUCRALFATE SCH GM: 1 SUSPENSION ORAL at 06:35

## 2018-02-14 RX ADMIN — SUCRALFATE SCH GM: 1 SUSPENSION ORAL at 21:32

## 2018-02-14 RX ADMIN — SUCRALFATE SCH GM: 1 SUSPENSION ORAL at 17:29

## 2018-02-14 NOTE — PN
LOCATION:    ICU 9.



SUBJECTIVE:  This is a 67-year-old male seen and examined for GI

consultation on 02/13/2018 in the emergency room as requested by the

emergency room staff, re-seen him again today, appeared to be somewhat

restless, was mildly disoriented, but no reported active bleeding.  No

reported chest pain or palpitation.  No tremors noted.



The entire chart is reviewed including, but not limited to the most recent

lab and radiology study results, current and the previous medication list,

current and the previous medical events, and the case discussed with the

staff at length.  Today's lab showed blood glucose level of 170.  The rest

of the labs still pending and the patient still have elevated BUN and

creatinine as per yesterday with normal liver function test but mildly

elevated alkaline phosphatase to 131 with low hemoglobin 10.4, low

hematocrit 31.7 with normal white blood cell and normal platelet count.



Official report of his chest x-ray done yesterday is seen.



PHYSICAL EXAMINATION:

GENERAL:  A 67-year-old male, appeared to be somewhat sleepy.

VITAL SIGNS:  Afebrile with blood pressure 120/64, respiratory rate 20 to

22, with pulse of 70.

HEENT:  Showed pale dry oral mucoid membrane, nonicteric sclerae.

LUNGS:  Few scattered crepitation, decreased air entry at bases.

HEART:  Positive S1 and S2.

ABDOMEN:    Soft with mild generalized tenderness.  No mass or

organomegaly.  No rebound tenderness or guarding.

RECTAL EXAMINATION:  The patient refused.

EXTREMITIES:  With mild lower extremity edematous changes.  No clubbing or

cyanosis.

NEUROLOGIC:  No reported new neurological deficits, sensory or motor.



IMPRESSION:

1.  Re-exacerbation of peptic ulcer disease.

2.  Alcoholism with alcoholic liver disease.

3.  Known history of alcohol-induced pancreatitis, renal insufficiency,

hypertension with diabetes mellitus.

4.  Anemia secondary to above.

5.  The patient had episodes of upper gastrointestinal bleeding at the time

of his admission; however, he had the recent upper and lower endoscopy,

please see endoscopy report.



SUGGESTIONS:

1.  Continue current management.

2.  Proton pump inhibitors.

3.  Follow up an H and H and ammonia level.

4.  Neomycin p.o.

5.  Further recommendations to follow.





__________________________________________

Neri Griffin MD



DD:  02/14/2018 13:44:55

DT:  02/14/2018 14:37:00

Job # [id number]

## 2018-02-14 NOTE — CP.CCUPN
CCU Subjective





- Physician Review


Subjective (Free Text): 





02/14/18 11:05


Patient seen and examined in the A.M.  No complaints at this time.


Critical Care Time Spent (in minutes): 45





CCU Objective





- Vital Signs / Intake & Output


Intake and Output (Last 8hrs): 


 Intake & Output











 02/13/18 02/14/18 02/14/18





 22:59 06:59 14:59


 


Intake Total 520 1160 520


 


Output Total 1600 495 460


 


Balance -1080 665 60


 


Weight 203 lb 208 lb 


 


Intake:   


 


  Intake, IV Amount 520 1160 520


 


    Left Hand 40 200 40


 


    Left Wrist 480 960 480


 


  Oral 0  


 


Output:   


 


  Urine 1600 495 460


 


    Urethral ([place]) 350 495 460


 


  Stool 0 0 


 


  Emesis 0  


 


Other:   


 


  Voiding Method Indwelling Catheter  














- Physical Exam


Head: Positive for: Atraumatic, Normocephalic


Pupils: Positive for: PERRL


Extroacular Muscles: Positive for: EOMI


Conjunctiva: Positive for: Normal


Mouth: Positive for: Moist Mucous Membranes.  Negative for: Normal Teeth


Neck: Positive for: Normal Range of Motion.  Negative for: Meningeal Signs, JVD


Respiratory/Chest: Positive for: Clear to Auscultation


Cardiovascular: Positive for: Regular Rate and Rhythm, Normal S1, S2


Abdomen: Positive for: Normal Bowel Sounds.  Negative for: Tenderness


Lower Extremity: Positive for: Normal Inspection


Skin: Positive for: Warm, Dry, Normal Color


Psychiatric: Positive for: Alert





- Medications


Active Medications: 


Active Medications











Generic Name Dose Route Start Last Admin





  Trade Name Freq  PRN Reason Stop Dose Admin


 


Artificial Tears  0 ml  02/13/18 10:00  02/14/18 09:59





  Artificial Tears  OD   1 drop





  DAILY HENRY   Administration


 


Folic Acid  1 mg  02/13/18 10:00 02/14/18 10:00





  Folic Acid  PO   1 mg





  DAILY HENRY   Administration


 


Sodium Chloride  1,000 mls @ 120 mls/hr  02/13/18 07:00  02/14/18 08:54





  Sodium Chloride 0.9%  IV   120 mls/hr





  .Q8H20M HENRY   Administration


 


Pantoprazole Sodium 80 mg/  100 mls @ 10 mls/hr  02/13/18 08:00  02/14/18 05:34





  Sodium Chloride  IVPB   10 mls/hr





  .Q10H HENRY   Administration





  8 MG/HR   


 


Piperacillin Sod/Tazobactam Sod  2.25 gm in 50 mls @ 100 mls/hr  02/13/18 15:00

  02/14/18 06:18





  Zosyn 2.25 Gm Iv Premix  IVPB   100 mls/hr





  Q8H HENRY   Administration


 


Insulin Human Regular  0 unit  02/13/18 07:30  02/14/18 08:09





  Novolin R  SC   4 unit





  [facility] HENRY   Administration





  Protocol   


 


Multivitamins  1 tab  02/14/18 10:00  02/14/18 10:00





  Hexavitamin  PO   1 tab





  DAILY HENRY   Administration


 


Ondansetron HCl  4 mg  02/13/18 12:00  02/14/18 06:27





  Zofran Inj  IVP   4 mg





  Q6 HENRY   Administration


 


Potassium Chloride  40 meq  02/13/18 10:00  02/14/18 10:01





  K-Dur 20 Meq Er Tab  PO   40 meq





  DAILY HENRY   Administration


 


Propranolol HCl  20 mg  02/13/18 10:00  02/14/18 10:00





  Inderal  PO   20 mg





  TID HENRY   Administration


 


Sucralfate  1 gm  02/13/18 07:30  02/14/18 06:35





  Carafate Oral Susp  PO   1 gm





  [facility] HENRY   Administration


 


Thiamine HCl  100 mg  02/13/18 10:00  02/14/18 10:01





  Vitamin B1 Tab  PO   100 mg





  DAILY HENRY   Administration














- Patient Studies


Lab Studies: 


 Microbiology Studies











 02/13/18 10:28 Ova and Parasite Concentrate Exam - Final





 Stool 








 Lab Studies











  02/14/18 02/14/18 02/14/18 Range/Units





  07:42 06:24 06:23 


 


WBC   6.7   (4.8-10.8)  K/uL


 


RBC   2.97 L   (4.40-5.90)  Mil/uL


 


Hgb   8.5 L   (12.0-18.0)  g/dL


 


Hct   26.1 L   (35.0-51.0)  %


 


MCV   87.9   (80.0-94.0)  fL


 


MCH   28.7   (27.0-31.0)  pg


 


MCHC   32.6 L   (33.0-37.0)  g/dL


 


RDW   21.0 H   (11.5-14.5)  %


 


Plt Count   141   (130-400)  K/uL


 


MPV   10.1   (7.2-11.7)  fL


 


Neut % (Auto)   74.1   (50.0-75.0)  %


 


Lymph % (Auto)   16.2 L   (20.0-40.0)  %


 


Mono % (Auto)   5.1   (0.0-10.0)  %


 


Eos % (Auto)   3.5   (0.0-4.0)  %


 


Baso % (Auto)   1.1   (0.0-2.0)  %


 


Neut # (Auto)   4.9   (1.8-7.0)  K/uL


 


Lymph # (Auto)   1.1   (1.0-4.3)  K/uL


 


Mono # (Auto)   0.3   (0.0-0.8)  K/uL


 


Eos # (Auto)   0.2   (0.0-0.7)  K/uL


 


Baso # (Auto)   0.1   (0.0-0.2)  K/uL


 


Sodium    138  (132-148)  mmol/L


 


Potassium    3.0 L  (3.6-5.2)  mmol/L


 


Chloride    106  ()  mmol/L


 


Carbon Dioxide    17 L  (22-30)  mmol/L


 


Anion Gap    18  (10-20)  


 


BUN    37 H  (9-20)  mg/dL


 


Creatinine    3.7 H  (0.8-1.5)  mg/dL


 


Est GFR ( Amer)    20  


 


Est GFR (Non-Af Amer)    16  


 


POC Glucose (mg/dL)  274 H    ()  mg/dL


 


Random Glucose    198 H  ()  mg/dL


 


Calcium    7.6 L  (8.6-10.4)  mg/dl


 


Total Bilirubin    0.7  (0.2-1.3)  mg/dL


 


AST    22  (17-59)  U/L


 


ALT    24  (21-72)  U/L


 


Alkaline Phosphatase    86  ()  U/L


 


Total Protein    6.0 L  (6.3-8.3)  g/dL


 


Albumin    2.7 L D  (3.5-5.0)  g/dL


 


Globulin    3.3  (2.2-3.9)  gm/dL


 


Albumin/Globulin Ratio    0.8 L  (1.0-2.1)  


 


Urine Color     (YELLOW)  


 


Urine Clarity     (Clear)  


 


Urine pH     (5.0-8.0)  


 


Ur Specific Gravity     (1.003-1.030)  


 


Urine Protein     (NEGATIVE)  mg/dL


 


Urine Glucose (UA)     (Normal)  mg/dL


 


Urine Ketones     (NEGATIVE)  mg/dL


 


Urine Blood     (NEGATIVE)  


 


Urine Nitrate     (NEGATIVE)  


 


Urine Bilirubin     (NEGATIVE)  


 


Urine Urobilinogen     (0.2-1.0)  mg/dL


 


Ur Leukocyte Esterase     (Negative)  Dahlia/uL


 


Urine WBC (Auto)     (0-5)  /hpf


 


Urine RBC (Auto)     (0-3)  /hpf


 


Ur Squamous Epith Cells     (0-5)  /hpf


 


Urine Bacteria     (<OCC)  


 


Hyaline Casts     (0-2)  /lpf


 


C. difficile Ag & Toxin     (NEGATIVE)  














  02/13/18 02/13/18 02/13/18 Range/Units





  21:31 16:58 14:05 


 


WBC     (4.8-10.8)  K/uL


 


RBC     (4.40-5.90)  Mil/uL


 


Hgb     (12.0-18.0)  g/dL


 


Hct     (35.0-51.0)  %


 


MCV     (80.0-94.0)  fL


 


MCH     (27.0-31.0)  pg


 


MCHC     (33.0-37.0)  g/dL


 


RDW     (11.5-14.5)  %


 


Plt Count     (130-400)  K/uL


 


MPV     (7.2-11.7)  fL


 


Neut % (Auto)     (50.0-75.0)  %


 


Lymph % (Auto)     (20.0-40.0)  %


 


Mono % (Auto)     (0.0-10.0)  %


 


Eos % (Auto)     (0.0-4.0)  %


 


Baso % (Auto)     (0.0-2.0)  %


 


Neut # (Auto)     (1.8-7.0)  K/uL


 


Lymph # (Auto)     (1.0-4.3)  K/uL


 


Mono # (Auto)     (0.0-0.8)  K/uL


 


Eos # (Auto)     (0.0-0.7)  K/uL


 


Baso # (Auto)     (0.0-0.2)  K/uL


 


Sodium     (132-148)  mmol/L


 


Potassium     (3.6-5.2)  mmol/L


 


Chloride     ()  mmol/L


 


Carbon Dioxide     (22-30)  mmol/L


 


Anion Gap     (10-20)  


 


BUN     (9-20)  mg/dL


 


Creatinine     (0.8-1.5)  mg/dL


 


Est GFR (African Amer)     


 


Est GFR (Non-Af Amer)     


 


POC Glucose (mg/dL)  170 H  213 H   ()  mg/dL


 


Random Glucose     ()  mg/dL


 


Calcium     (8.6-10.4)  mg/dl


 


Total Bilirubin     (0.2-1.3)  mg/dL


 


AST     (17-59)  U/L


 


ALT     (21-72)  U/L


 


Alkaline Phosphatase     ()  U/L


 


Total Protein     (6.3-8.3)  g/dL


 


Albumin     (3.5-5.0)  g/dL


 


Globulin     (2.2-3.9)  gm/dL


 


Albumin/Globulin Ratio     (1.0-2.1)  


 


Urine Color    Yellow  (YELLOW)  


 


Urine Clarity    Hazy  (Clear)  


 


Urine pH    5.0  (5.0-8.0)  


 


Ur Specific Gravity    1.017  (1.003-1.030)  


 


Urine Protein    2+ H  (NEGATIVE)  mg/dL


 


Urine Glucose (UA)    2+ H  (Normal)  mg/dL


 


Urine Ketones    Trace  (NEGATIVE)  mg/dL


 


Urine Blood    2+ H  (NEGATIVE)  


 


Urine Nitrate    Negative  (NEGATIVE)  


 


Urine Bilirubin    Negative  (NEGATIVE)  


 


Urine Urobilinogen    Normal  (0.2-1.0)  mg/dL


 


Ur Leukocyte Esterase    1+ H  (Negative)  Dahlia/uL


 


Urine WBC (Auto)    7 H  (0-5)  /hpf


 


Urine RBC (Auto)    16 H  (0-3)  /hpf


 


Ur Squamous Epith Cells    6 H  (0-5)  /hpf


 


Urine Bacteria    Rare  (<OCC)  


 


Hyaline Casts    3-5 H  (0-2)  /lpf


 


C. difficile Ag & Toxin     (NEGATIVE)  














  02/13/18 02/13/18 Range/Units





  11:44 09:00 


 


WBC    (4.8-10.8)  K/uL


 


RBC    (4.40-5.90)  Mil/uL


 


Hgb    (12.0-18.0)  g/dL


 


Hct    (35.0-51.0)  %


 


MCV    (80.0-94.0)  fL


 


MCH    (27.0-31.0)  pg


 


MCHC    (33.0-37.0)  g/dL


 


RDW    (11.5-14.5)  %


 


Plt Count    (130-400)  K/uL


 


MPV    (7.2-11.7)  fL


 


Neut % (Auto)    (50.0-75.0)  %


 


Lymph % (Auto)    (20.0-40.0)  %


 


Mono % (Auto)    (0.0-10.0)  %


 


Eos % (Auto)    (0.0-4.0)  %


 


Baso % (Auto)    (0.0-2.0)  %


 


Neut # (Auto)    (1.8-7.0)  K/uL


 


Lymph # (Auto)    (1.0-4.3)  K/uL


 


Mono # (Auto)    (0.0-0.8)  K/uL


 


Eos # (Auto)    (0.0-0.7)  K/uL


 


Baso # (Auto)    (0.0-0.2)  K/uL


 


Sodium    (132-148)  mmol/L


 


Potassium    (3.6-5.2)  mmol/L


 


Chloride    ()  mmol/L


 


Carbon Dioxide    (22-30)  mmol/L


 


Anion Gap    (10-20)  


 


BUN    (9-20)  mg/dL


 


Creatinine    (0.8-1.5)  mg/dL


 


Est GFR (African Amer)    


 


Est GFR (Non-Af Amer)    


 


POC Glucose (mg/dL)  241 H   ()  mg/dL


 


Random Glucose    ()  mg/dL


 


Calcium    (8.6-10.4)  mg/dl


 


Total Bilirubin    (0.2-1.3)  mg/dL


 


AST    (17-59)  U/L


 


ALT    (21-72)  U/L


 


Alkaline Phosphatase    ()  U/L


 


Total Protein    (6.3-8.3)  g/dL


 


Albumin    (3.5-5.0)  g/dL


 


Globulin    (2.2-3.9)  gm/dL


 


Albumin/Globulin Ratio    (1.0-2.1)  


 


Urine Color    (YELLOW)  


 


Urine Clarity    (Clear)  


 


Urine pH    (5.0-8.0)  


 


Ur Specific Gravity    (1.003-1.030)  


 


Urine Protein    (NEGATIVE)  mg/dL


 


Urine Glucose (UA)    (Normal)  mg/dL


 


Urine Ketones    (NEGATIVE)  mg/dL


 


Urine Blood    (NEGATIVE)  


 


Urine Nitrate    (NEGATIVE)  


 


Urine Bilirubin    (NEGATIVE)  


 


Urine Urobilinogen    (0.2-1.0)  mg/dL


 


Ur Leukocyte Esterase    (Negative)  Dahlia/uL


 


Urine WBC (Auto)    (0-5)  /hpf


 


Urine RBC (Auto)    (0-3)  /hpf


 


Ur Squamous Epith Cells    (0-5)  /hpf


 


Urine Bacteria    (<OCC)  


 


Hyaline Casts    (0-2)  /lpf


 


C. difficile Ag & Toxin     (NEGATIVE)  








 Laboratory Results - last 24 hr











  02/13/18 02/13/18 02/13/18





  09:00 11:44 14:05


 


WBC   


 


RBC   


 


Hgb   


 


Hct   


 


MCV   


 


MCH   


 


MCHC   


 


RDW   


 


Plt Count   


 


MPV   


 


Neut % (Auto)   


 


Lymph % (Auto)   


 


Mono % (Auto)   


 


Eos % (Auto)   


 


Baso % (Auto)   


 


Neut # (Auto)   


 


Lymph # (Auto)   


 


Mono # (Auto)   


 


Eos # (Auto)   


 


Baso # (Auto)   


 


Sodium   


 


Potassium   


 


Chloride   


 


Carbon Dioxide   


 


Anion Gap   


 


BUN   


 


Creatinine   


 


Est GFR ( Amer)   


 


Est GFR (Non-Af Amer)   


 


POC Glucose (mg/dL)   241 H 


 


Random Glucose   


 


Calcium   


 


Total Bilirubin   


 


AST   


 


ALT   


 


Alkaline Phosphatase   


 


Total Protein   


 


Albumin   


 


Globulin   


 


Albumin/Globulin Ratio   


 


Urine Color    Yellow


 


Urine Clarity    Hazy


 


Urine pH    5.0


 


Ur Specific Gravity    1.017


 


Urine Protein    2+ H


 


Urine Glucose (UA)    2+ H


 


Urine Ketones    Trace


 


Urine Blood    2+ H


 


Urine Nitrate    Negative


 


Urine Bilirubin    Negative


 


Urine Urobilinogen    Normal


 


Ur Leukocyte Esterase    1+ H


 


Urine WBC (Auto)    7 H


 


Urine RBC (Auto)    16 H


 


Ur Squamous Epith Cells    6 H


 


Urine Bacteria    Rare


 


Hyaline Casts    3-5 H


 


C. difficile Ag & Toxin    














  02/13/18 02/13/18 02/14/18





  16:58 21:31 06:23


 


WBC   


 


RBC   


 


Hgb   


 


Hct   


 


MCV   


 


MCH   


 


MCHC   


 


RDW   


 


Plt Count   


 


MPV   


 


Neut % (Auto)   


 


Lymph % (Auto)   


 


Mono % (Auto)   


 


Eos % (Auto)   


 


Baso % (Auto)   


 


Neut # (Auto)   


 


Lymph # (Auto)   


 


Mono # (Auto)   


 


Eos # (Auto)   


 


Baso # (Auto)   


 


Sodium    138


 


Potassium    3.0 L


 


Chloride    106


 


Carbon Dioxide    17 L


 


Anion Gap    18


 


BUN    37 H


 


Creatinine    3.7 H


 


Est GFR ( Amer)    20


 


Est GFR (Non-Af Amer)    16


 


POC Glucose (mg/dL)  213 H  170 H 


 


Random Glucose    198 H


 


Calcium    7.6 L


 


Total Bilirubin    0.7


 


AST    22


 


ALT    24


 


Alkaline Phosphatase    86


 


Total Protein    6.0 L


 


Albumin    2.7 L D


 


Globulin    3.3


 


Albumin/Globulin Ratio    0.8 L


 


Urine Color   


 


Urine Clarity   


 


Urine pH   


 


Ur Specific Gravity   


 


Urine Protein   


 


Urine Glucose (UA)   


 


Urine Ketones   


 


Urine Blood   


 


Urine Nitrate   


 


Urine Bilirubin   


 


Urine Urobilinogen   


 


Ur Leukocyte Esterase   


 


Urine WBC (Auto)   


 


Urine RBC (Auto)   


 


Ur Squamous Epith Cells   


 


Urine Bacteria   


 


Hyaline Casts   


 


C. difficile Ag & Toxin   














  02/14/18 02/14/18





  06:24 07:42


 


WBC  6.7 


 


RBC  2.97 L 


 


Hgb  8.5 L 


 


Hct  26.1 L 


 


MCV  87.9 


 


MCH  28.7 


 


MCHC  32.6 L 


 


RDW  21.0 H 


 


Plt Count  141 


 


MPV  10.1 


 


Neut % (Auto)  74.1 


 


Lymph % (Auto)  16.2 L 


 


Mono % (Auto)  5.1 


 


Eos % (Auto)  3.5 


 


Baso % (Auto)  1.1 


 


Neut # (Auto)  4.9 


 


Lymph # (Auto)  1.1 


 


Mono # (Auto)  0.3 


 


Eos # (Auto)  0.2 


 


Baso # (Auto)  0.1 


 


Sodium  


 


Potassium  


 


Chloride  


 


Carbon Dioxide  


 


Anion Gap  


 


BUN  


 


Creatinine  


 


Est GFR ( Amer)  


 


Est GFR (Non-Af Amer)  


 


POC Glucose (mg/dL)   274 H


 


Random Glucose  


 


Calcium  


 


Total Bilirubin  


 


AST  


 


ALT  


 


Alkaline Phosphatase  


 


Total Protein  


 


Albumin  


 


Globulin  


 


Albumin/Globulin Ratio  


 


Urine Color  


 


Urine Clarity  


 


Urine pH  


 


Ur Specific Gravity  


 


Urine Protein  


 


Urine Glucose (UA)  


 


Urine Ketones  


 


Urine Blood  


 


Urine Nitrate  


 


Urine Bilirubin  


 


Urine Urobilinogen  


 


Ur Leukocyte Esterase  


 


Urine WBC (Auto)  


 


Urine RBC (Auto)  


 


Ur Squamous Epith Cells  


 


Urine Bacteria  


 


Hyaline Casts  


 


C. difficile Ag & Toxin  











Fingerstick Blood Sugar Results: 274





Critical Care Progress Note





- Nutrition


Nutrition: 


 Nutrition











 Category Date Time Status


 


 NPO Diet [DIET] Diets  02/13/18 Breakfast Active

## 2018-02-14 NOTE — CP.CCUPN
<CarlyOlin - Last Filed: 02/14/18 15:49>





CCU Subjective





- Physician Review


Events Since Last Encounter (Free Text): 





02/14/18 11:05


Per nursing no acute events occurred overnight.


Subjective (Free Text): 





02/14/18 11:05


Patient seen and examined in the A.M.  No complaints at this time.


Critical Care Time Spent (in minutes): 45





CCU Objective





- Vital Signs / Intake & Output


Intake and Output (Last 8hrs): 


 Intake & Output











 02/13/18 02/14/18 02/14/18





 22:59 06:59 14:59


 


Intake Total 520 1160 520


 


Output Total 1600 495 460


 


Balance -1080 665 60


 


Weight 203 lb 208 lb 


 


Intake:   


 


  Intake, IV Amount 520 1160 520


 


    Left Hand 40 200 40


 


    Left Wrist 480 960 480


 


  Oral 0  


 


Output:   


 


  Urine 1600 495 460


 


    Urethral (Crenshaw) 350 495 460


 


  Stool 0 0 


 


  Emesis 0  


 


Other:   


 


  Voiding Method Indwelling Catheter  














- Physical Exam


Head: Positive for: Atraumatic, Normocephalic


Pupils: Positive for: PERRL


Extroacular Muscles: Positive for: EOMI


Conjunctiva: Positive for: Normal


Mouth: Positive for: Moist Mucous Membranes.  Negative for: Normal Teeth


Neck: Positive for: Normal Range of Motion.  Negative for: Meningeal Signs, JVD


Respiratory/Chest: Positive for: Clear to Auscultation


Cardiovascular: Positive for: Regular Rate and Rhythm, Normal S1, S2


Abdomen: Positive for: Normal Bowel Sounds.  Negative for: Tenderness


Lower Extremity: Positive for: Normal Inspection


Skin: Positive for: Warm, Dry, Normal Color


Psychiatric: Positive for: Alert





- Medications


Active Medications: 


Active Medications











Generic Name Dose Route Start Last Admin





  Trade Name Freq  PRN Reason Stop Dose Admin


 


Artificial Tears  0 ml  02/13/18 10:00  02/14/18 09:59





  Artificial Tears  OD   1 drop





  DAILY HENRY   Administration


 


Folic Acid  1 mg  02/13/18 10:00  02/14/18 10:00





  Folic Acid  PO   1 mg





  DAILY HENRY   Administration


 


Sodium Chloride  1,000 mls @ 120 mls/hr  02/13/18 07:00  02/14/18 08:54





  Sodium Chloride 0.9%  IV   120 mls/hr





  .Q8H20M HENRY   Administration


 


Pantoprazole Sodium 80 mg/  100 mls @ 10 mls/hr  02/13/18 08:00  02/14/18 05:34





  Sodium Chloride  IVPB   10 mls/hr





  .Q10H HENRY   Administration





  8 MG/HR   


 


Piperacillin Sod/Tazobactam Sod  2.25 gm in 50 mls @ 100 mls/hr  02/13/18 15:00

  02/14/18 06:18





  Zosyn 2.25 Gm Iv Premix  IVPB   100 mls/hr





  Q8H HENRY   Administration


 


Insulin Human Regular  0 unit  02/13/18 07:30  02/14/18 08:09





  Novolin R  SC   4 unit





  ACHS HENRY   Administration





  Protocol   


 


Multivitamins  1 tab  02/14/18 10:00  02/14/18 10:00





  Hexavitamin  PO   1 tab





  DAILY HENRY   Administration


 


Ondansetron HCl  4 mg  02/13/18 12:00  02/14/18 06:27





  Zofran Inj  IVP   4 mg





  Q6 HENRY   Administration


 


Potassium Chloride  40 meq  02/13/18 10:00  02/14/18 10:01





  K-Dur 20 Meq Er Tab  PO   40 meq





  DAILY HENRY   Administration


 


Propranolol HCl  20 mg  02/13/18 10:00  02/14/18 10:00





  Inderal  PO   20 mg





  TID HENRY   Administration


 


Sucralfate  1 gm  02/13/18 07:30  02/14/18 06:35





  Carafate Oral Susp  PO   1 gm





  ACHS HENRY   Administration


 


Thiamine HCl  100 mg  02/13/18 10:00  02/14/18 10:01





  Vitamin B1 Tab  PO   100 mg





  DAILY HENRY   Administration














- Patient Studies


Lab Studies: 


 Microbiology Studies











 02/13/18 10:28 Ova and Parasite Concentrate Exam - Final





 Stool 








 Lab Studies











  02/14/18 02/14/18 02/14/18 Range/Units





  07:42 06:24 06:23 


 


WBC   6.7   (4.8-10.8)  K/uL


 


RBC   2.97 L   (4.40-5.90)  Mil/uL


 


Hgb   8.5 L   (12.0-18.0)  g/dL


 


Hct   26.1 L   (35.0-51.0)  %


 


MCV   87.9   (80.0-94.0)  fL


 


MCH   28.7   (27.0-31.0)  pg


 


MCHC   32.6 L   (33.0-37.0)  g/dL


 


RDW   21.0 H   (11.5-14.5)  %


 


Plt Count   141   (130-400)  K/uL


 


MPV   10.1   (7.2-11.7)  fL


 


Neut % (Auto)   74.1   (50.0-75.0)  %


 


Lymph % (Auto)   16.2 L   (20.0-40.0)  %


 


Mono % (Auto)   5.1   (0.0-10.0)  %


 


Eos % (Auto)   3.5   (0.0-4.0)  %


 


Baso % (Auto)   1.1   (0.0-2.0)  %


 


Neut # (Auto)   4.9   (1.8-7.0)  K/uL


 


Lymph # (Auto)   1.1   (1.0-4.3)  K/uL


 


Mono # (Auto)   0.3   (0.0-0.8)  K/uL


 


Eos # (Auto)   0.2   (0.0-0.7)  K/uL


 


Baso # (Auto)   0.1   (0.0-0.2)  K/uL


 


Sodium    138  (132-148)  mmol/L


 


Potassium    3.0 L  (3.6-5.2)  mmol/L


 


Chloride    106  ()  mmol/L


 


Carbon Dioxide    17 L  (22-30)  mmol/L


 


Anion Gap    18  (10-20)  


 


BUN    37 H  (9-20)  mg/dL


 


Creatinine    3.7 H  (0.8-1.5)  mg/dL


 


Est GFR ( Amer)    20  


 


Est GFR (Non-Af Amer)    16  


 


POC Glucose (mg/dL)  274 H    ()  mg/dL


 


Random Glucose    198 H  ()  mg/dL


 


Calcium    7.6 L  (8.6-10.4)  mg/dl


 


Total Bilirubin    0.7  (0.2-1.3)  mg/dL


 


AST    22  (17-59)  U/L


 


ALT    24  (21-72)  U/L


 


Alkaline Phosphatase    86  ()  U/L


 


Total Protein    6.0 L  (6.3-8.3)  g/dL


 


Albumin    2.7 L D  (3.5-5.0)  g/dL


 


Globulin    3.3  (2.2-3.9)  gm/dL


 


Albumin/Globulin Ratio    0.8 L  (1.0-2.1)  


 


Urine Color     (YELLOW)  


 


Urine Clarity     (Clear)  


 


Urine pH     (5.0-8.0)  


 


Ur Specific Gravity     (1.003-1.030)  


 


Urine Protein     (NEGATIVE)  mg/dL


 


Urine Glucose (UA)     (Normal)  mg/dL


 


Urine Ketones     (NEGATIVE)  mg/dL


 


Urine Blood     (NEGATIVE)  


 


Urine Nitrate     (NEGATIVE)  


 


Urine Bilirubin     (NEGATIVE)  


 


Urine Urobilinogen     (0.2-1.0)  mg/dL


 


Ur Leukocyte Esterase     (Negative)  Dahlia/uL


 


Urine WBC (Auto)     (0-5)  /hpf


 


Urine RBC (Auto)     (0-3)  /hpf


 


Ur Squamous Epith Cells     (0-5)  /hpf


 


Urine Bacteria     (<OCC)  


 


Hyaline Casts     (0-2)  /lpf


 


C. difficile Ag & Toxin     (NEGATIVE)  














  02/13/18 02/13/18 02/13/18 Range/Units





  21:31 16:58 14:05 


 


WBC     (4.8-10.8)  K/uL


 


RBC     (4.40-5.90)  Mil/uL


 


Hgb     (12.0-18.0)  g/dL


 


Hct     (35.0-51.0)  %


 


MCV     (80.0-94.0)  fL


 


MCH     (27.0-31.0)  pg


 


MCHC     (33.0-37.0)  g/dL


 


RDW     (11.5-14.5)  %


 


Plt Count     (130-400)  K/uL


 


MPV     (7.2-11.7)  fL


 


Neut % (Auto)     (50.0-75.0)  %


 


Lymph % (Auto)     (20.0-40.0)  %


 


Mono % (Auto)     (0.0-10.0)  %


 


Eos % (Auto)     (0.0-4.0)  %


 


Baso % (Auto)     (0.0-2.0)  %


 


Neut # (Auto)     (1.8-7.0)  K/uL


 


Lymph # (Auto)     (1.0-4.3)  K/uL


 


Mono # (Auto)     (0.0-0.8)  K/uL


 


Eos # (Auto)     (0.0-0.7)  K/uL


 


Baso # (Auto)     (0.0-0.2)  K/uL


 


Sodium     (132-148)  mmol/L


 


Potassium     (3.6-5.2)  mmol/L


 


Chloride     ()  mmol/L


 


Carbon Dioxide     (22-30)  mmol/L


 


Anion Gap     (10-20)  


 


BUN     (9-20)  mg/dL


 


Creatinine     (0.8-1.5)  mg/dL


 


Est GFR (African Amer)     


 


Est GFR (Non-Af Amer)     


 


POC Glucose (mg/dL)  170 H  213 H   ()  mg/dL


 


Random Glucose     ()  mg/dL


 


Calcium     (8.6-10.4)  mg/dl


 


Total Bilirubin     (0.2-1.3)  mg/dL


 


AST     (17-59)  U/L


 


ALT     (21-72)  U/L


 


Alkaline Phosphatase     ()  U/L


 


Total Protein     (6.3-8.3)  g/dL


 


Albumin     (3.5-5.0)  g/dL


 


Globulin     (2.2-3.9)  gm/dL


 


Albumin/Globulin Ratio     (1.0-2.1)  


 


Urine Color    Yellow  (YELLOW)  


 


Urine Clarity    Hazy  (Clear)  


 


Urine pH    5.0  (5.0-8.0)  


 


Ur Specific Gravity    1.017  (1.003-1.030)  


 


Urine Protein    2+ H  (NEGATIVE)  mg/dL


 


Urine Glucose (UA)    2+ H  (Normal)  mg/dL


 


Urine Ketones    Trace  (NEGATIVE)  mg/dL


 


Urine Blood    2+ H  (NEGATIVE)  


 


Urine Nitrate    Negative  (NEGATIVE)  


 


Urine Bilirubin    Negative  (NEGATIVE)  


 


Urine Urobilinogen    Normal  (0.2-1.0)  mg/dL


 


Ur Leukocyte Esterase    1+ H  (Negative)  Dahlia/uL


 


Urine WBC (Auto)    7 H  (0-5)  /hpf


 


Urine RBC (Auto)    16 H  (0-3)  /hpf


 


Ur Squamous Epith Cells    6 H  (0-5)  /hpf


 


Urine Bacteria    Rare  (<OCC)  


 


Hyaline Casts    3-5 H  (0-2)  /lpf


 


C. difficile Ag & Toxin     (NEGATIVE)  














  02/13/18 02/13/18 Range/Units





  11:44 09:00 


 


WBC    (4.8-10.8)  K/uL


 


RBC    (4.40-5.90)  Mil/uL


 


Hgb    (12.0-18.0)  g/dL


 


Hct    (35.0-51.0)  %


 


MCV    (80.0-94.0)  fL


 


MCH    (27.0-31.0)  pg


 


MCHC    (33.0-37.0)  g/dL


 


RDW    (11.5-14.5)  %


 


Plt Count    (130-400)  K/uL


 


MPV    (7.2-11.7)  fL


 


Neut % (Auto)    (50.0-75.0)  %


 


Lymph % (Auto)    (20.0-40.0)  %


 


Mono % (Auto)    (0.0-10.0)  %


 


Eos % (Auto)    (0.0-4.0)  %


 


Baso % (Auto)    (0.0-2.0)  %


 


Neut # (Auto)    (1.8-7.0)  K/uL


 


Lymph # (Auto)    (1.0-4.3)  K/uL


 


Mono # (Auto)    (0.0-0.8)  K/uL


 


Eos # (Auto)    (0.0-0.7)  K/uL


 


Baso # (Auto)    (0.0-0.2)  K/uL


 


Sodium    (132-148)  mmol/L


 


Potassium    (3.6-5.2)  mmol/L


 


Chloride    ()  mmol/L


 


Carbon Dioxide    (22-30)  mmol/L


 


Anion Gap    (10-20)  


 


BUN    (9-20)  mg/dL


 


Creatinine    (0.8-1.5)  mg/dL


 


Est GFR (African Amer)    


 


Est GFR (Non-Af Amer)    


 


POC Glucose (mg/dL)  241 H   ()  mg/dL


 


Random Glucose    ()  mg/dL


 


Calcium    (8.6-10.4)  mg/dl


 


Total Bilirubin    (0.2-1.3)  mg/dL


 


AST    (17-59)  U/L


 


ALT    (21-72)  U/L


 


Alkaline Phosphatase    ()  U/L


 


Total Protein    (6.3-8.3)  g/dL


 


Albumin    (3.5-5.0)  g/dL


 


Globulin    (2.2-3.9)  gm/dL


 


Albumin/Globulin Ratio    (1.0-2.1)  


 


Urine Color    (YELLOW)  


 


Urine Clarity    (Clear)  


 


Urine pH    (5.0-8.0)  


 


Ur Specific Gravity    (1.003-1.030)  


 


Urine Protein    (NEGATIVE)  mg/dL


 


Urine Glucose (UA)    (Normal)  mg/dL


 


Urine Ketones    (NEGATIVE)  mg/dL


 


Urine Blood    (NEGATIVE)  


 


Urine Nitrate    (NEGATIVE)  


 


Urine Bilirubin    (NEGATIVE)  


 


Urine Urobilinogen    (0.2-1.0)  mg/dL


 


Ur Leukocyte Esterase    (Negative)  Dahlia/uL


 


Urine WBC (Auto)    (0-5)  /hpf


 


Urine RBC (Auto)    (0-3)  /hpf


 


Ur Squamous Epith Cells    (0-5)  /hpf


 


Urine Bacteria    (<OCC)  


 


Hyaline Casts    (0-2)  /lpf


 


C. difficile Ag & Toxin     (NEGATIVE)  








 Laboratory Results - last 24 hr











  02/13/18 02/13/18 02/13/18





  09:00 11:44 14:05


 


WBC   


 


RBC   


 


Hgb   


 


Hct   


 


MCV   


 


MCH   


 


MCHC   


 


RDW   


 


Plt Count   


 


MPV   


 


Neut % (Auto)   


 


Lymph % (Auto)   


 


Mono % (Auto)   


 


Eos % (Auto)   


 


Baso % (Auto)   


 


Neut # (Auto)   


 


Lymph # (Auto)   


 


Mono # (Auto)   


 


Eos # (Auto)   


 


Baso # (Auto)   


 


Sodium   


 


Potassium   


 


Chloride   


 


Carbon Dioxide   


 


Anion Gap   


 


BUN   


 


Creatinine   


 


Est GFR ( Amer)   


 


Est GFR (Non-Af Amer)   


 


POC Glucose (mg/dL)   241 H 


 


Random Glucose   


 


Calcium   


 


Total Bilirubin   


 


AST   


 


ALT   


 


Alkaline Phosphatase   


 


Total Protein   


 


Albumin   


 


Globulin   


 


Albumin/Globulin Ratio   


 


Urine Color    Yellow


 


Urine Clarity    Hazy


 


Urine pH    5.0


 


Ur Specific Gravity    1.017


 


Urine Protein    2+ H


 


Urine Glucose (UA)    2+ H


 


Urine Ketones    Trace


 


Urine Blood    2+ H


 


Urine Nitrate    Negative


 


Urine Bilirubin    Negative


 


Urine Urobilinogen    Normal


 


Ur Leukocyte Esterase    1+ H


 


Urine WBC (Auto)    7 H


 


Urine RBC (Auto)    16 H


 


Ur Squamous Epith Cells    6 H


 


Urine Bacteria    Rare


 


Hyaline Casts    3-5 H


 


C. difficile Ag & Toxin    














  02/13/18 02/13/18 02/14/18





  16:58 21:31 06:23


 


WBC   


 


RBC   


 


Hgb   


 


Hct   


 


MCV   


 


MCH   


 


MCHC   


 


RDW   


 


Plt Count   


 


MPV   


 


Neut % (Auto)   


 


Lymph % (Auto)   


 


Mono % (Auto)   


 


Eos % (Auto)   


 


Baso % (Auto)   


 


Neut # (Auto)   


 


Lymph # (Auto)   


 


Mono # (Auto)   


 


Eos # (Auto)   


 


Baso # (Auto)   


 


Sodium    138


 


Potassium    3.0 L


 


Chloride    106


 


Carbon Dioxide    17 L


 


Anion Gap    18


 


BUN    37 H


 


Creatinine    3.7 H


 


Est GFR ( Amer)    20


 


Est GFR (Non-Af Amer)    16


 


POC Glucose (mg/dL)  213 H  170 H 


 


Random Glucose    198 H


 


Calcium    7.6 L


 


Total Bilirubin    0.7


 


AST    22


 


ALT    24


 


Alkaline Phosphatase    86


 


Total Protein    6.0 L


 


Albumin    2.7 L D


 


Globulin    3.3


 


Albumin/Globulin Ratio    0.8 L


 


Urine Color   


 


Urine Clarity   


 


Urine pH   


 


Ur Specific Gravity   


 


Urine Protein   


 


Urine Glucose (UA)   


 


Urine Ketones   


 


Urine Blood   


 


Urine Nitrate   


 


Urine Bilirubin   


 


Urine Urobilinogen   


 


Ur Leukocyte Esterase   


 


Urine WBC (Auto)   


 


Urine RBC (Auto)   


 


Ur Squamous Epith Cells   


 


Urine Bacteria   


 


Hyaline Casts   


 


C. difficile Ag & Toxin   














  02/14/18 02/14/18





  06:24 07:42


 


WBC  6.7 


 


RBC  2.97 L 


 


Hgb  8.5 L 


 


Hct  26.1 L 


 


MCV  87.9 


 


MCH  28.7 


 


MCHC  32.6 L 


 


RDW  21.0 H 


 


Plt Count  141 


 


MPV  10.1 


 


Neut % (Auto)  74.1 


 


Lymph % (Auto)  16.2 L 


 


Mono % (Auto)  5.1 


 


Eos % (Auto)  3.5 


 


Baso % (Auto)  1.1 


 


Neut # (Auto)  4.9 


 


Lymph # (Auto)  1.1 


 


Mono # (Auto)  0.3 


 


Eos # (Auto)  0.2 


 


Baso # (Auto)  0.1 


 


Sodium  


 


Potassium  


 


Chloride  


 


Carbon Dioxide  


 


Anion Gap  


 


BUN  


 


Creatinine  


 


Est GFR ( Amer)  


 


Est GFR (Non-Af Amer)  


 


POC Glucose (mg/dL)   274 H


 


Random Glucose  


 


Calcium  


 


Total Bilirubin  


 


AST  


 


ALT  


 


Alkaline Phosphatase  


 


Total Protein  


 


Albumin  


 


Globulin  


 


Albumin/Globulin Ratio  


 


Urine Color  


 


Urine Clarity  


 


Urine pH  


 


Ur Specific Gravity  


 


Urine Protein  


 


Urine Glucose (UA)  


 


Urine Ketones  


 


Urine Blood  


 


Urine Nitrate  


 


Urine Bilirubin  


 


Urine Urobilinogen  


 


Ur Leukocyte Esterase  


 


Urine WBC (Auto)  


 


Urine RBC (Auto)  


 


Ur Squamous Epith Cells  


 


Urine Bacteria  


 


Hyaline Casts  


 


C. difficile Ag & Toxin  











Fingerstick Blood Sugar Results: 274





Review of Systems





- EENT


Eyes: absent: Blurred Vision, Discharge, Loss of Peripheral Vision, Sees Flashes


Ears: absent: Ear Pain, Dizziness


Nose/Mouth/Throat: absent: Nasal Congestion, Nose Pain, Odynophagia, Neck Pain





- Cardiovascular


Cardiovascular: absent: Chest Pain, Diaphoresis, Irregular Heart Rhythm, Leg 

Edema, Palpitations, Pedal Edema, Syncope





- Respiratory


Respiratory: absent: Hemoptysis, Pain on Inspiration, Change in Mucous Color





- Gastrointestinal


Gastrointestinal: absent: Belching, Dyspepsia, Loose Stools, Melena, Nausea, 

Vomiting





- Genitourinary


Genitourinary: absent: Difficulty Urinating, Urinary Urgency, Bladder Distension





- Musculoskeletal


Musculoskeletal: absent: Arthralgias, Atrophy, Numbness





- Neurological


Neurological: absent: Loss of Vision, Syncope, Vertigo, Weakness





- Psychiatric


Psychiatric: absent: Anxiety, Homicidal Ideation





- Hematologic/Lymphatic


Hematologic: absent: Easy Bleeding, Easy Bruising





Critical Care Progress Note





- Nutrition


Nutrition: 


 Nutrition











 Category Date Time Status


 


 NPO Diet [DIET] Diets  02/13/18 Breakfast Active














Assessment/Plan





- Assessment and Plan (Free Text)


Assessment: 





67 year old male with a past medical history of anemia, dm, hyptertension, etoh 

pancreatitis who comes in for upper gi bleed.


Plan: 





Cardiology (Hypertension)


-Lasix and Lisinopril held at this time.





Endocrinology (Diabetes)


-[facility]





Gastroenterology (GI bleed)


-GI consulted, Dr. Quintanilla; recs appreciated


-Endoscopy (1/26/18) showed candidiasis esophagitis, gastric ulcer (biopsied); 

erythematous mucosa in prepyloric region of stomach and erythematous 

duodenopathy. 


-Colonscopy (1/30/18) showed colitis, diverticulitis, internal hemorrhoids, and 

chronic inflammation. 


-Continue Protonix drip


-Continue NS@120mls/hr





Nephrology(ARF)


-IV fluids NS Bolus


-Zestril and Lasix held as it could be contributing to Acute Kidney Injury


-Zosyn (renal dosing)





PPX


-Folic acid


-Multivitamin


-Thiamine


-Protonix





Disposition:Patient stable and Transferred out to Med/Surg unit.





<Cesar Perry - Last Filed: 02/14/18 17:24>





CCU Subjective





- Physician Review


Critical Care Time Spent (in minutes): 30





CCU Objective





- Vital Signs / Intake & Output


Intake and Output (Last 8hrs): 


 Intake & Output











 02/14/18 02/14/18 02/14/18





 06:59 14:59 22:59


 


Intake Total 1160 1040 130


 


Output Total 495 860 100


 


Balance 665 180 30


 


Weight 208 lb  


 


Intake:   


 


  Intake, IV Amount 1160 1040 130


 


    Left Hand 200 80 10


 


    Left Wrist 960 960 120


 


Output:   


 


  Urine 495 860 100


 


    Urethral (Crenshaw) 495 860 100


 


  Stool 0  














- Medications


Active Medications: 


Active Medications











Generic Name Dose Route Start Last Admin





  Trade Name Freq  PRN Reason Stop Dose Admin


 


Artificial Tears  0 ml  02/13/18 10:00  02/14/18 09:59





  Artificial Tears  OD   1 drop





  DAILY HENRY   Administration


 


Folic Acid  1 mg  02/13/18 10:00  02/14/18 10:00





  Folic Acid  PO   1 mg





  DAILY HENRY   Administration


 


Sodium Chloride  1,000 mls @ 120 mls/hr  02/13/18 07:00  02/14/18 08:54





  Sodium Chloride 0.9%  IV   120 mls/hr





  .Q8H20M HENRY   Administration


 


Pantoprazole Sodium 80 mg/  100 mls @ 10 mls/hr  02/13/18 08:00  02/14/18 14:39





  Sodium Chloride  IVPB   Not Given





  .Q10H HENRY   





  8 MG/HR   


 


Piperacillin Sod/Tazobactam Sod  2.25 gm in 50 mls @ 100 mls/hr  02/13/18 15:00

  02/14/18 15:47





  Zosyn 2.25 Gm Iv Premix  IVPB   100 mls/hr





  Q8H HENRY   Administration


 


Insulin Human Regular  0 unit  02/13/18 07:30  02/14/18 12:43





  Novolin R  SC   2 unit





  ACHS HENRY   Administration





  Protocol   


 


Multivitamins  1 tab  02/14/18 10:00  02/14/18 10:00





  Hexavitamin  PO   1 tab





  DAILY HENRY   Administration


 


Ondansetron HCl  4 mg  02/13/18 12:00  02/14/18 14:43





  Zofran Inj  IVP   Not Given





  Q6 HENRY   


 


Potassium Chloride  40 meq  02/13/18 10:00  02/14/18 10:01





  K-Dur 20 Meq Er Tab  PO   40 meq





  DAILY HENRY   Administration


 


Propranolol HCl  20 mg  02/13/18 10:00  02/14/18 14:40





  Inderal  PO   20 mg





  TID HENRY   Administration


 


Sucralfate  1 gm  02/13/18 07:30  02/14/18 12:44





  Carafate Oral Susp  PO   1 gm





  ACHS HENRY   Administration


 


Thiamine HCl  100 mg  02/13/18 10:00  02/14/18 10:01





  Vitamin B1 Tab  PO   100 mg





  DAILY HENRY   Administration














- Patient Studies


Lab Studies: 


 Microbiology Studies











 02/13/18 10:28 Ova and Parasite Concentrate Exam - Final





 Stool 








 Lab Studies











  02/14/18 02/14/18 02/14/18 Range/Units





  16:33 14:30 11:18 


 


WBC     (4.8-10.8)  K/uL


 


RBC     (4.40-5.90)  Mil/uL


 


Hgb     (12.0-18.0)  g/dL


 


Hct     (35.0-51.0)  %


 


MCV     (80.0-94.0)  fL


 


MCH     (27.0-31.0)  pg


 


MCHC     (33.0-37.0)  g/dL


 


RDW     (11.5-14.5)  %


 


Plt Count     (130-400)  K/uL


 


MPV     (7.2-11.7)  fL


 


Neut % (Auto)     (50.0-75.0)  %


 


Lymph % (Auto)     (20.0-40.0)  %


 


Mono % (Auto)     (0.0-10.0)  %


 


Eos % (Auto)     (0.0-4.0)  %


 


Baso % (Auto)     (0.0-2.0)  %


 


Neut # (Auto)     (1.8-7.0)  K/uL


 


Lymph # (Auto)     (1.0-4.3)  K/uL


 


Mono # (Auto)     (0.0-0.8)  K/uL


 


Eos # (Auto)     (0.0-0.7)  K/uL


 


Baso # (Auto)     (0.0-0.2)  K/uL


 


Sodium     (132-148)  mmol/L


 


Potassium     (3.6-5.2)  mmol/L


 


Chloride     ()  mmol/L


 


Carbon Dioxide     (22-30)  mmol/L


 


Anion Gap     (10-20)  


 


BUN     (9-20)  mg/dL


 


Creatinine     (0.8-1.5)  mg/dL


 


Est GFR (African Amer)     


 


Est GFR (Non-Af Amer)     


 


POC Glucose (mg/dL)  189 H   181 H  ()  mg/dL


 


Random Glucose     ()  mg/dL


 


Calcium     (8.6-10.4)  mg/dl


 


Total Bilirubin     (0.2-1.3)  mg/dL


 


AST     (17-59)  U/L


 


ALT     (21-72)  U/L


 


Alkaline Phosphatase     ()  U/L


 


Ammonia   10   (9-33)  umol/L


 


Total Protein     (6.3-8.3)  g/dL


 


Albumin     (3.5-5.0)  g/dL


 


Globulin     (2.2-3.9)  gm/dL


 


Albumin/Globulin Ratio     (1.0-2.1)  














  02/14/18 02/14/18 02/14/18 Range/Units





  07:42 06:24 06:23 


 


WBC   6.7   (4.8-10.8)  K/uL


 


RBC   2.97 L   (4.40-5.90)  Mil/uL


 


Hgb   8.5 L   (12.0-18.0)  g/dL


 


Hct   26.1 L   (35.0-51.0)  %


 


MCV   87.9   (80.0-94.0)  fL


 


MCH   28.7   (27.0-31.0)  pg


 


MCHC   32.6 L   (33.0-37.0)  g/dL


 


RDW   21.0 H   (11.5-14.5)  %


 


Plt Count   141   (130-400)  K/uL


 


MPV   10.1   (7.2-11.7)  fL


 


Neut % (Auto)   74.1   (50.0-75.0)  %


 


Lymph % (Auto)   16.2 L   (20.0-40.0)  %


 


Mono % (Auto)   5.1   (0.0-10.0)  %


 


Eos % (Auto)   3.5   (0.0-4.0)  %


 


Baso % (Auto)   1.1   (0.0-2.0)  %


 


Neut # (Auto)   4.9   (1.8-7.0)  K/uL


 


Lymph # (Auto)   1.1   (1.0-4.3)  K/uL


 


Mono # (Auto)   0.3   (0.0-0.8)  K/uL


 


Eos # (Auto)   0.2   (0.0-0.7)  K/uL


 


Baso # (Auto)   0.1   (0.0-0.2)  K/uL


 


Sodium    138  (132-148)  mmol/L


 


Potassium    3.0 L  (3.6-5.2)  mmol/L


 


Chloride    106  ()  mmol/L


 


Carbon Dioxide    17 L  (22-30)  mmol/L


 


Anion Gap    18  (10-20)  


 


BUN    37 H  (9-20)  mg/dL


 


Creatinine    3.7 H  (0.8-1.5)  mg/dL


 


Est GFR ( Amer)    20  


 


Est GFR (Non-Af Amer)    16  


 


POC Glucose (mg/dL)  274 H    ()  mg/dL


 


Random Glucose    198 H  ()  mg/dL


 


Calcium    7.6 L  (8.6-10.4)  mg/dl


 


Total Bilirubin    0.7  (0.2-1.3)  mg/dL


 


AST    22  (17-59)  U/L


 


ALT    24  (21-72)  U/L


 


Alkaline Phosphatase    86  ()  U/L


 


Ammonia     (9-33)  umol/L


 


Total Protein    6.0 L  (6.3-8.3)  g/dL


 


Albumin    2.7 L D  (3.5-5.0)  g/dL


 


Globulin    3.3  (2.2-3.9)  gm/dL


 


Albumin/Globulin Ratio    0.8 L  (1.0-2.1)  














  02/13/18 Range/Units





  21:31 


 


WBC   (4.8-10.8)  K/uL


 


RBC   (4.40-5.90)  Mil/uL


 


Hgb   (12.0-18.0)  g/dL


 


Hct   (35.0-51.0)  %


 


MCV   (80.0-94.0)  fL


 


MCH   (27.0-31.0)  pg


 


MCHC   (33.0-37.0)  g/dL


 


RDW   (11.5-14.5)  %


 


Plt Count   (130-400)  K/uL


 


MPV   (7.2-11.7)  fL


 


Neut % (Auto)   (50.0-75.0)  %


 


Lymph % (Auto)   (20.0-40.0)  %


 


Mono % (Auto)   (0.0-10.0)  %


 


Eos % (Auto)   (0.0-4.0)  %


 


Baso % (Auto)   (0.0-2.0)  %


 


Neut # (Auto)   (1.8-7.0)  K/uL


 


Lymph # (Auto)   (1.0-4.3)  K/uL


 


Mono # (Auto)   (0.0-0.8)  K/uL


 


Eos # (Auto)   (0.0-0.7)  K/uL


 


Baso # (Auto)   (0.0-0.2)  K/uL


 


Sodium   (132-148)  mmol/L


 


Potassium   (3.6-5.2)  mmol/L


 


Chloride   ()  mmol/L


 


Carbon Dioxide   (22-30)  mmol/L


 


Anion Gap   (10-20)  


 


BUN   (9-20)  mg/dL


 


Creatinine   (0.8-1.5)  mg/dL


 


Est GFR (African Amer)   


 


Est GFR (Non-Af Amer)   


 


POC Glucose (mg/dL)  170 H  ()  mg/dL


 


Random Glucose   ()  mg/dL


 


Calcium   (8.6-10.4)  mg/dl


 


Total Bilirubin   (0.2-1.3)  mg/dL


 


AST   (17-59)  U/L


 


ALT   (21-72)  U/L


 


Alkaline Phosphatase   ()  U/L


 


Ammonia   (9-33)  umol/L


 


Total Protein   (6.3-8.3)  g/dL


 


Albumin   (3.5-5.0)  g/dL


 


Globulin   (2.2-3.9)  gm/dL


 


Albumin/Globulin Ratio   (1.0-2.1)  








 Laboratory Results - last 24 hr











  02/13/18 02/14/18 02/14/18





  21:31 06:23 06:24


 


WBC    6.7


 


RBC    2.97 L


 


Hgb    8.5 L


 


Hct    26.1 L


 


MCV    87.9


 


MCH    28.7


 


MCHC    32.6 L


 


RDW    21.0 H


 


Plt Count    141


 


MPV    10.1


 


Neut % (Auto)    74.1


 


Lymph % (Auto)    16.2 L


 


Mono % (Auto)    5.1


 


Eos % (Auto)    3.5


 


Baso % (Auto)    1.1


 


Neut # (Auto)    4.9


 


Lymph # (Auto)    1.1


 


Mono # (Auto)    0.3


 


Eos # (Auto)    0.2


 


Baso # (Auto)    0.1


 


Sodium   138 


 


Potassium   3.0 L 


 


Chloride   106 


 


Carbon Dioxide   17 L 


 


Anion Gap   18 


 


BUN   37 H 


 


Creatinine   3.7 H 


 


Est GFR ( Amer)   20 


 


Est GFR (Non-Af Amer)   16 


 


POC Glucose (mg/dL)  170 H  


 


Random Glucose   198 H 


 


Calcium   7.6 L 


 


Total Bilirubin   0.7 


 


AST   22 


 


ALT   24 


 


Alkaline Phosphatase   86 


 


Ammonia   


 


Total Protein   6.0 L 


 


Albumin   2.7 L D 


 


Globulin   3.3 


 


Albumin/Globulin Ratio   0.8 L 














  02/14/18 02/14/18 02/14/18





  07:42 11:18 14:30


 


WBC   


 


RBC   


 


Hgb   


 


Hct   


 


MCV   


 


MCH   


 


MCHC   


 


RDW   


 


Plt Count   


 


MPV   


 


Neut % (Auto)   


 


Lymph % (Auto)   


 


Mono % (Auto)   


 


Eos % (Auto)   


 


Baso % (Auto)   


 


Neut # (Auto)   


 


Lymph # (Auto)   


 


Mono # (Auto)   


 


Eos # (Auto)   


 


Baso # (Auto)   


 


Sodium   


 


Potassium   


 


Chloride   


 


Carbon Dioxide   


 


Anion Gap   


 


BUN   


 


Creatinine   


 


Est GFR ( Amer)   


 


Est GFR (Non-Af Amer)   


 


POC Glucose (mg/dL)  274 H  181 H 


 


Random Glucose   


 


Calcium   


 


Total Bilirubin   


 


AST   


 


ALT   


 


Alkaline Phosphatase   


 


Ammonia    10


 


Total Protein   


 


Albumin   


 


Globulin   


 


Albumin/Globulin Ratio   














  02/14/18





  16:33


 


WBC 


 


RBC 


 


Hgb 


 


Hct 


 


MCV 


 


MCH 


 


MCHC 


 


RDW 


 


Plt Count 


 


MPV 


 


Neut % (Auto) 


 


Lymph % (Auto) 


 


Mono % (Auto) 


 


Eos % (Auto) 


 


Baso % (Auto) 


 


Neut # (Auto) 


 


Lymph # (Auto) 


 


Mono # (Auto) 


 


Eos # (Auto) 


 


Baso # (Auto) 


 


Sodium 


 


Potassium 


 


Chloride 


 


Carbon Dioxide 


 


Anion Gap 


 


BUN 


 


Creatinine 


 


Est GFR ( Amer) 


 


Est GFR (Non-Af Amer) 


 


POC Glucose (mg/dL)  189 H


 


Random Glucose 


 


Calcium 


 


Total Bilirubin 


 


AST 


 


ALT 


 


Alkaline Phosphatase 


 


Ammonia 


 


Total Protein 


 


Albumin 


 


Globulin 


 


Albumin/Globulin Ratio 














Critical Care Progress Note





- Nutrition


Nutrition: 


 Nutrition











 Category Date Time Status


 


 NPO Diet [DIET] Diets  02/13/18 Breakfast Active














Attending/Attestation





- Attestation


I have personally seen and examined this patient.: Yes


I have fully participated in the care of the patient.: Yes


I have reviewed all pertinent clinical information: Yes


Notes (Text): 





02/14/18 17:24


patient seen and examined in the intensive care unit.


improving renal function


Continue IV fluids


Status post EGD and colonoscopy


On IV antibiotics


Consider transferring to floor

## 2018-02-15 LAB
ALBUMIN SERPL-MCNC: 2.7 G/DL (ref 3.5–5)
ALBUMIN/GLOB SERPL: 0.8 {RATIO} (ref 1–2.1)
ALT SERPL-CCNC: 25 U/L (ref 21–72)
AST SERPL-CCNC: 22 U/L (ref 17–59)
BASOPHILS # BLD AUTO: 0.1 K/UL (ref 0–0.2)
BASOPHILS NFR BLD: 1.7 % (ref 0–2)
BUN SERPL-MCNC: 26 MG/DL (ref 9–20)
CALCIUM SERPL-MCNC: 7.6 MG/DL (ref 8.6–10.4)
EOSINOPHIL # BLD AUTO: 0.3 K/UL (ref 0–0.7)
EOSINOPHIL NFR BLD: 6.1 % (ref 0–4)
ERYTHROCYTE [DISTWIDTH] IN BLOOD BY AUTOMATED COUNT: 20.9 % (ref 11.5–14.5)
GFR NON-AFRICAN AMERICAN: 26
HGB BLD-MCNC: 8.7 G/DL (ref 12–18)
LYMPHOCYTES # BLD AUTO: 1 K/UL (ref 1–4.3)
LYMPHOCYTES NFR BLD AUTO: 22.4 % (ref 20–40)
MCH RBC QN AUTO: 28.4 PG (ref 27–31)
MCHC RBC AUTO-ENTMCNC: 31.6 G/DL (ref 33–37)
MCV RBC AUTO: 89.8 FL (ref 80–94)
MONOCYTES # BLD: 0.3 K/UL (ref 0–0.8)
MONOCYTES NFR BLD: 5.8 % (ref 0–10)
NEUTROPHILS # BLD: 3 K/UL (ref 1.8–7)
NEUTROPHILS NFR BLD AUTO: 64 % (ref 50–75)
NRBC BLD AUTO-RTO: 0.1 % (ref 0–2)
PLATELET # BLD: 141 K/UL (ref 130–400)
PMV BLD AUTO: 9.9 FL (ref 7.2–11.7)
RBC # BLD AUTO: 3.08 MIL/UL (ref 4.4–5.9)
WBC # BLD AUTO: 4.7 K/UL (ref 4.8–10.8)

## 2018-02-15 RX ADMIN — POTASSIUM CHLORIDE SCH MEQ: 20 TABLET, EXTENDED RELEASE ORAL at 10:40

## 2018-02-15 RX ADMIN — SUCRALFATE SCH GM: 1 SUSPENSION ORAL at 17:25

## 2018-02-15 RX ADMIN — HUMAN INSULIN SCH: 100 INJECTION, SOLUTION SUBCUTANEOUS at 12:35

## 2018-02-15 RX ADMIN — Medication SCH TAB: at 10:40

## 2018-02-15 RX ADMIN — SUCRALFATE SCH GM: 1 SUSPENSION ORAL at 10:40

## 2018-02-15 RX ADMIN — POLYVINYL ALCOHOL SCH DROP: 14 SOLUTION/ DROPS OPHTHALMIC at 10:40

## 2018-02-15 RX ADMIN — TAZOBACTAM SODIUM AND PIPERACILLIN SODIUM SCH MLS/HR: 250; 2 INJECTION, SOLUTION INTRAVENOUS at 22:14

## 2018-02-15 RX ADMIN — TAZOBACTAM SODIUM AND PIPERACILLIN SODIUM SCH MLS/HR: 250; 2 INJECTION, SOLUTION INTRAVENOUS at 14:00

## 2018-02-15 RX ADMIN — HUMAN INSULIN SCH: 100 INJECTION, SOLUTION SUBCUTANEOUS at 21:49

## 2018-02-15 RX ADMIN — SUCRALFATE SCH GM: 1 SUSPENSION ORAL at 22:07

## 2018-02-15 RX ADMIN — HUMAN INSULIN SCH UNIT: 100 INJECTION, SOLUTION SUBCUTANEOUS at 17:25

## 2018-02-15 RX ADMIN — SUCRALFATE SCH GM: 1 SUSPENSION ORAL at 07:36

## 2018-02-15 RX ADMIN — TAZOBACTAM SODIUM AND PIPERACILLIN SODIUM SCH MLS/HR: 250; 2 INJECTION, SOLUTION INTRAVENOUS at 06:06

## 2018-02-15 RX ADMIN — HUMAN INSULIN SCH UNIT: 100 INJECTION, SOLUTION SUBCUTANEOUS at 07:37

## 2018-02-15 NOTE — PN
DATE:  02/15/2018.



LOCATION:  [facility], bed B.



SUBJECTIVE:  This is a 67-year-old male seen and examined in rounds appear

to be more awake, alert, oriented, mandate to self without any evidence of

agitation this morning.  The entire chart is reviewed including but not

limited to the most recent lab and radiology study results, current and

previous medication list, current and the previous medical events and the

case discussed at length with the staff.



LABORATORY DATA:  Today's lab showed hemoglobin of 8.7, hematocrit 27.6

with low potassium 3.5, low CO2 content of 15 indicative of metabolic

acidosis.  BUN elevated 26, creatinine 2.5 with blood glucose level 206

with low calcium 7.6 and low albumin 2.7, low total protein 6.0.



PHYSICAL EXAMINATION:

GENERAL:  A 67 years male appear to be somewhat more awake, alert.

VITAL SIGNS:  Afebrile with pulse of 64, respiratory rate 18 to 20, blood

pressure of 128/72.

HEENT:  Showed pale, dry mucous membrane.  Nonicteric sclerae.

LUNGS:  A few scattered crepitation, decreased air entry at bases.

HEART:  Positive S1 and S2.

ABDOMEN:  Soft, slight distention.  Mildly obese with generalized

tenderness.  No mass or organomegaly.  No rebound tenderness or guarding.

EXTREMITIES: Without significant clubbing or cyanosis, but lower extremity

edematous changes.

NEUROLOGIC:  No reported new neurological deficits, sensory or motor.



IMPRESSION:

1.  Recently reported upper gastrointestinal blood loss none since

admission.

2.  Reexacerbation of peptic ulcer disease.

3.  Alcoholism.

4.  Liver cirrhosis secondary to above.

5.  Known history of, but not limited to alcoholic pancreatitis with renal

insufficiency.

6.  Known history of diabetes mellitus, hypertension.

7.  Anemia secondary to above.



SUGGESTIONS:

1.  Continue current management.

2.  Guaiac all the stool daily x3.

3.  Repeat serum lipase and amylase level.

4.  Advance diet gradually.

5.  Carafate liquid p.o.

6.  Further recommendation to follow.  It has to be mentioned that if the

patient had any subsequent episode of recurrent hematemesis as reported

before then to consider repeat upper endoscopy only as needed otherwise

close observation to follow.







__________________________________________

Neri Griffin MD





DD:  02/15/2018 13:18:03

DT:  02/15/2018 13:43:21

Job # [id number]

## 2018-02-16 LAB
ALBUMIN SERPL-MCNC: 2.8 G/DL (ref 3.5–5)
ALBUMIN/GLOB SERPL: 0.9 {RATIO} (ref 1–2.1)
ALT SERPL-CCNC: 19 U/L (ref 21–72)
AST SERPL-CCNC: 41 U/L (ref 17–59)
BASOPHILS # BLD AUTO: 0.1 K/UL (ref 0–0.2)
BASOPHILS NFR BLD: 1 % (ref 0–2)
BUN SERPL-MCNC: 19 MG/DL (ref 9–20)
CALCIUM SERPL-MCNC: 8.3 MG/DL (ref 8.6–10.4)
EOSINOPHIL # BLD AUTO: 0.4 K/UL (ref 0–0.7)
EOSINOPHIL NFR BLD: 7.4 % (ref 0–4)
ERYTHROCYTE [DISTWIDTH] IN BLOOD BY AUTOMATED COUNT: 21.2 % (ref 11.5–14.5)
GFR NON-AFRICAN AMERICAN: 38
HGB BLD-MCNC: 9.2 G/DL (ref 12–18)
LYMPHOCYTES # BLD AUTO: 1 K/UL (ref 1–4.3)
LYMPHOCYTES NFR BLD AUTO: 20.3 % (ref 20–40)
MCH RBC QN AUTO: 28.2 PG (ref 27–31)
MCHC RBC AUTO-ENTMCNC: 32.3 G/DL (ref 33–37)
MCV RBC AUTO: 87.3 FL (ref 80–94)
MONOCYTES # BLD: 0.3 K/UL (ref 0–0.8)
MONOCYTES NFR BLD: 5.9 % (ref 0–10)
NEUTROPHILS # BLD: 3.3 K/UL (ref 1.8–7)
NEUTROPHILS NFR BLD AUTO: 65.4 % (ref 50–75)
NRBC BLD AUTO-RTO: 0 % (ref 0–2)
PLATELET # BLD: 152 K/UL (ref 130–400)
PMV BLD AUTO: 8.9 FL (ref 7.2–11.7)
RBC # BLD AUTO: 3.25 MIL/UL (ref 4.4–5.9)
WBC # BLD AUTO: 5 K/UL (ref 4.8–10.8)

## 2018-02-16 RX ADMIN — HUMAN INSULIN SCH: 100 INJECTION, SOLUTION SUBCUTANEOUS at 21:59

## 2018-02-16 RX ADMIN — TAZOBACTAM SODIUM AND PIPERACILLIN SODIUM SCH MLS/HR: 250; 2 INJECTION, SOLUTION INTRAVENOUS at 06:06

## 2018-02-16 RX ADMIN — Medication SCH TAB: at 09:59

## 2018-02-16 RX ADMIN — POLYVINYL ALCOHOL SCH DROP: 14 SOLUTION/ DROPS OPHTHALMIC at 09:59

## 2018-02-16 RX ADMIN — TAZOBACTAM SODIUM AND PIPERACILLIN SODIUM SCH MLS/HR: 250; 2 INJECTION, SOLUTION INTRAVENOUS at 15:37

## 2018-02-16 RX ADMIN — HUMAN INSULIN SCH UNIT: 100 INJECTION, SOLUTION SUBCUTANEOUS at 17:42

## 2018-02-16 RX ADMIN — SUCRALFATE SCH GM: 1 SUSPENSION ORAL at 17:18

## 2018-02-16 RX ADMIN — HUMAN INSULIN SCH UNIT: 100 INJECTION, SOLUTION SUBCUTANEOUS at 08:55

## 2018-02-16 RX ADMIN — POTASSIUM CHLORIDE SCH MEQ: 20 TABLET, EXTENDED RELEASE ORAL at 09:59

## 2018-02-16 RX ADMIN — HUMAN INSULIN SCH UNIT: 100 INJECTION, SOLUTION SUBCUTANEOUS at 13:05

## 2018-02-16 RX ADMIN — SUCRALFATE SCH GM: 1 SUSPENSION ORAL at 12:17

## 2018-02-16 RX ADMIN — SUCRALFATE SCH GM: 1 SUSPENSION ORAL at 22:15

## 2018-02-16 RX ADMIN — SUCRALFATE SCH GM: 1 SUSPENSION ORAL at 08:55

## 2018-02-16 RX ADMIN — TAZOBACTAM SODIUM AND PIPERACILLIN SODIUM SCH MLS/HR: 250; 2 INJECTION, SOLUTION INTRAVENOUS at 22:15

## 2018-02-16 NOTE — CP.PCM.PN
Subjective





- Date & Time of Evaluation


Date of Evaluation: 02/16/18


Time of Evaluation: 07:30





- Subjective


Subjective: 





PGY1 Medicine Note for Dr. Fung





Patient seen and examined at bedside this morning. Overnight, patients BP was 

recorded as 202/99, after administration of propanolol, came down to 182/92, no 

further orders were made as the patient was not symptomatic. Currently, patient 

is comfortable and knows where he is and what day and year it is. Patient is 

eating well. Patient complains of mild abdominal ache's. He denies fevers, 

nausea, vomiting, hematochezia, diarrhea, constipation, chest pain, or SOB. 





Objective





- Vital Signs/Intake and Output


Vital Signs (last 24 hours): 


 











Temp Pulse Resp BP Pulse Ox


 


 96.5 F L  65   20   195/80 H  95 


 


 02/16/18 07:00  02/16/18 07:00  02/16/18 07:00  02/16/18 07:00  02/16/18 07:00








Intake and Output: 


 











 02/16/18 02/16/18





 06:59 18:59


 


Output Total 800 600


 


Balance -800 -600














- Medications


Medications: 


 Current Medications





Artificial Tears (Artificial Tears)  0 ml OD DAILY [facility]


   Last Admin: 02/16/18 09:59 Dose:  1 drop


Folic Acid (Folic Acid)  1 mg PO DAILY [facility]


   Last Admin: 02/16/18 09:59 Dose:  1 mg


Pantoprazole Sodium 80 mg/ (Sodium Chloride)  100 mls @ 10 mls/hr IVPB .Q10H [facility]


   PRN Reason: 8 MG/HR


   Last Admin: 02/16/18 05:09 Dose:  10 mls/hr


Piperacillin Sod/Tazobactam Sod (Zosyn 2.25 Gm Iv Premix)  2.25 gm in 50 mls @ 

100 mls/hr IVPB Q8H [facility]


   Last Admin: 02/16/18 06:06 Dose:  100 mls/hr


Insulin Human Regular (Novolin R)  0 unit SC ACHS [facility]


   PRN Reason: Protocol


   Last Admin: 02/16/18 13:05 Dose:  1 unit


Multivitamins (Hexavitamin)  1 tab PO DAILY [facility]


   Last Admin: 02/16/18 09:59 Dose:  1 tab


Neomycin Sulfate (Neomycin Tab)  500 mg PO Q6H [facility]


   Last Admin: 02/16/18 13:36 Dose:  500 mg


Ondansetron HCl (Zofran Inj)  4 mg IVP Q6 [facility]


   Last Admin: 02/14/18 14:43 Dose:  Not Given


Potassium Chloride (K-Dur 20 Meq Er Tab)  40 meq PO DAILY [facility]


   Last Admin: 02/16/18 09:59 Dose:  40 meq


Propranolol HCl (Inderal)  20 mg PO TID [facility]


   Last Admin: 02/16/18 13:36 Dose:  20 mg


Sucralfate (Carafate Oral Susp)  1 gm PO ACHS [facility]


   Last Admin: 02/16/18 12:17 Dose:  1 gm


Thiamine HCl (Vitamin B1 Tab)  100 mg PO DAILY [facility]


   Last Admin: 02/16/18 09:59 Dose:  100 mg











- Labs


Labs: 


 





 02/16/18 06:31 





 02/16/18 06:30 





 











PT  13.8 SECONDS (9.7-12.2)  H  02/13/18  04:37    


 


INR  1.2   02/13/18  04:37    


 


APTT  38 SECONDS (21-34)  H  02/13/18  04:37    














- Constitutional


Appears: Non-toxic, No Acute Distress





- Head Exam


Head Exam: ATRAUMATIC, NORMOCEPHALIC





- Eye Exam


Eye Exam: EOMI, Normal appearance


Pupil Exam: NORMAL ACCOMODATION





- ENT Exam


ENT Exam: Mucous Membranes Moist





- Respiratory Exam


Respiratory Exam: Clear to Ausculation Bilateral, NORMAL BREATHING PATTERN.  

absent: Accessory Muscle Use, Rales, Rhonchi, Wheezes, Respiratory Distress





- Cardiovascular Exam


Cardiovascular Exam: REGULAR RHYTHM, +S1, +S2





- GI/Abdominal Exam


GI & Abdominal Exam: Soft, Normal Bowel Sounds.  absent: Distended, Firm, 

Guarding, Rigid, Tenderness





- Extremities Exam


Extremities Exam: absent: Calf Tenderness, Pedal Edema





- Neurological Exam


Neurological Exam: Alert, Awake, Oriented x3





- Psychiatric Exam


Psychiatric exam: Normal Affect, Normal Mood





- Skin


Skin Exam: Dry, Warm





Assessment and Plan





- Assessment and Plan (Free Text)


Plan: 





GI bleed


- GI consulted, Dr. Quintanilla; recs appreciated


- Endoscopy (1/26/18) showed candidiasis esophagitis, gastric ulcer (biopsied); 

erythematous mucosa in prepyloric region of stomach and erythematous 

duodenopathy. 


- Colonscopy (1/30/18) showed colitis, diverticulitis, internal hemorrhoids, 

and chronic inflammation. 


- Continue Protonix drip


- Continue NS@120mls/hr


- Started on Neomycin Sulfate 500mg PO q6h





Acute Renal Failure


- Upon Admission Cr. 4.6


- Today's Cr 1.8


- Ammonia 29


- Zestril and Lasix held as it could be contributing to Acute Kidney Injury


- Zosyn 2.25gm 





Hypertension


- Lasix and Lisinopril held at this time.


- Overnight, patients BP was recorded as 202/99, after administration of 

propanolol, came down to 182/92.





Diabetes


- ISS


- Accuchecks ACHS





PPX


- Folic acid 1 mg PO daily


- Multivitamin 1 tab PO daily


- Thiamine 100mg PO daily


- Potassium 40meq PO daily (takes at home)





DISPO: Patient came to ER from [facility], discharge patient back to [facility] in morning. 





Case discussed with Dr. Antonieta Gamble Alex PGY1

## 2018-02-16 NOTE — CON
DATE:  02/13/2018.



This is from Dr. Neri Griffin to Dr. Marlo Fung.



I was called for GI consultation by the admitting medical team.  The

patient was seen and fully examined initially on 02/13/2018.  The entire

chart is reviewed including but not limited to the most recent lab and

radiology study results, current and the previous medication list, current

and the previous medical events, allergy to medication list as well as all

the available current and the previous medical records.  Case discussed

with the staff as well as the admitting medical team at length including

the patient's family on 02/13/2018.



HISTORY OF PRESENT ILLNESS:  This is a 67-year-old male well known case for

me from previous recent admission, was admitted to the hospital through the

emergency room, who was having complaint of abdominal pain, recurrent

nausea and vomiting of coffee-ground material, but no reported chest pain,

significant shortness of breath, palpitations, but mild abdominal aching

pain as per the family's statements.



No reported rectal bleeding.



PAST MEDICAL HISTORY:  Including but not limited to;

1.  Alcoholism.

2.  Alcohol induced pancreatitis.

3.  Hypertension.

4.  Diabetes mellitus with evidence of diabetic gastroparesis.

5.  Peptic ulcer disease, with anemia.

6.  Status post appendectomy.

7.  The patient had recently upper and lower endoscopy by myself, please

see protocols and reports.



FAMILY HISTORY:  Unknown.



SOCIAL HISTORY:  Positive for alcohol intake in excess.



CURRENT MEDICATIONS:  Medication list were reviewed.



After being admitted to the hospital, initial blood workup showed

leukocytosis of 10.3, low hemoglobin 10.4, low hematocrit 31.7, with low

potassium 3.5, with increased blood glucose level to 221, increased BUN of

39, creatinine 4.6.



PHYSICAL EXAMINATION:

GENERAL:  A 67-year-old male, somewhat awake and alert.

VITAL SIGNS:  Afebrile with pulse of 72, respiratory rate 20 to 22, blood

pressure of 136/74.

HEENT:  Showed pale, dry oral mucous membranes.  Slightly icteric sclerae.

LYMPH NODES:  No lymphadenitis or lymphadenopathy.

LUNGS:  Few scattered crepitation with decreased air entry at bases.

HEART:  Positive S1 and S2.

ABDOMEN:  Soft with slight generalized tenderness.  No mass or

organomegaly.  No rebound tenderness or guarding.

RECTAL:  Patient refused.

EXTREMITIES:  Without significant clubbing or cyanosis, but with lower

extremity edematous changes and very mild fine upper extremities tremors.

NEUROLOGIC:  No reported new neurological deficits, sensory or motor.  No

new focal deficits.



IMPRESSION:

1.  Recurrent upper gastrointestinal blood loss, most likely secondary to

diffuse erosive gastritis.

2.  Reexacerbation of acute pancreatitis on top of chronic pancreatitis,

alcohol induced.

3.  Anemia secondary to above.

4.  Multiple past medical history including but not limited to

hypertension, diabetes mellitus, diabetic gastroparesis as well as anemia.



SUGGESTIONS:

1.  Agree with your plan.

2.  Carafate p.o.

3.  Repeat serum lipase and amylase level.

4.  Prefer hyperalimentation.

5.  Keep n.p.o. except oral medication.

6.  Proton pump inhibitors IV.

7.  Reglan IV.

8.  Repeat abdominal ultrasound.

9.  Further recommendation to follow.







__________________________________________

Neri Griffin MD





DD:  02/15/2018 20:31:51

DT:  02/16/2018 0:39:40

Job # [id number]

## 2018-02-17 LAB
ALBUMIN SERPL-MCNC: 3 G/DL (ref 3.5–5)
ALBUMIN/GLOB SERPL: 0.9 {RATIO} (ref 1–2.1)
ALT SERPL-CCNC: 37 U/L (ref 21–72)
AST SERPL-CCNC: 27 U/L (ref 17–59)
BASOPHILS # BLD AUTO: 0.1 K/UL (ref 0–0.2)
BASOPHILS NFR BLD: 1.6 % (ref 0–2)
BUN SERPL-MCNC: 13 MG/DL (ref 9–20)
CALCIUM SERPL-MCNC: 8.8 MG/DL (ref 8.6–10.4)
EOSINOPHIL # BLD AUTO: 0.5 K/UL (ref 0–0.7)
EOSINOPHIL NFR BLD: 8.4 % (ref 0–4)
ERYTHROCYTE [DISTWIDTH] IN BLOOD BY AUTOMATED COUNT: 19.8 % (ref 11.5–14.5)
GFR NON-AFRICAN AMERICAN: 43
HGB BLD-MCNC: 9.7 G/DL (ref 12–18)
LYMPHOCYTES # BLD AUTO: 1.1 K/UL (ref 1–4.3)
LYMPHOCYTES NFR BLD AUTO: 19.7 % (ref 20–40)
MCH RBC QN AUTO: 28.2 PG (ref 27–31)
MCHC RBC AUTO-ENTMCNC: 32 G/DL (ref 33–37)
MCV RBC AUTO: 88.1 FL (ref 80–94)
MONOCYTES # BLD: 0.3 K/UL (ref 0–0.8)
MONOCYTES NFR BLD: 5.4 % (ref 0–10)
NEUTROPHILS # BLD: 3.6 K/UL (ref 1.8–7)
NEUTROPHILS NFR BLD AUTO: 64.9 % (ref 50–75)
NRBC BLD AUTO-RTO: 0.1 % (ref 0–2)
PLATELET # BLD: 158 K/UL (ref 130–400)
PMV BLD AUTO: 9.1 FL (ref 7.2–11.7)
RBC # BLD AUTO: 3.46 MIL/UL (ref 4.4–5.9)
WBC # BLD AUTO: 5.5 K/UL (ref 4.8–10.8)

## 2018-02-17 RX ADMIN — HUMAN INSULIN SCH UNIT: 100 INJECTION, SOLUTION SUBCUTANEOUS at 17:25

## 2018-02-17 RX ADMIN — HUMAN INSULIN SCH UNIT: 100 INJECTION, SOLUTION SUBCUTANEOUS at 12:50

## 2018-02-17 RX ADMIN — SUCRALFATE SCH GM: 1 SUSPENSION ORAL at 23:03

## 2018-02-17 RX ADMIN — HUMAN INSULIN SCH: 100 INJECTION, SOLUTION SUBCUTANEOUS at 22:30

## 2018-02-17 RX ADMIN — Medication SCH TAB: at 10:17

## 2018-02-17 RX ADMIN — HUMAN INSULIN SCH UNIT: 100 INJECTION, SOLUTION SUBCUTANEOUS at 08:33

## 2018-02-17 RX ADMIN — POLYVINYL ALCOHOL SCH DROP: 14 SOLUTION/ DROPS OPHTHALMIC at 10:18

## 2018-02-17 RX ADMIN — POTASSIUM CHLORIDE SCH MEQ: 20 TABLET, EXTENDED RELEASE ORAL at 10:18

## 2018-02-17 RX ADMIN — TAZOBACTAM SODIUM AND PIPERACILLIN SODIUM SCH MLS/HR: 250; 2 INJECTION, SOLUTION INTRAVENOUS at 23:03

## 2018-02-17 RX ADMIN — SUCRALFATE SCH GM: 1 SUSPENSION ORAL at 17:25

## 2018-02-17 RX ADMIN — SUCRALFATE SCH GM: 1 SUSPENSION ORAL at 08:33

## 2018-02-17 RX ADMIN — SUCRALFATE SCH GM: 1 SUSPENSION ORAL at 12:50

## 2018-02-17 RX ADMIN — TAZOBACTAM SODIUM AND PIPERACILLIN SODIUM SCH MLS/HR: 250; 2 INJECTION, SOLUTION INTRAVENOUS at 14:08

## 2018-02-17 RX ADMIN — TAZOBACTAM SODIUM AND PIPERACILLIN SODIUM SCH MLS/HR: 250; 2 INJECTION, SOLUTION INTRAVENOUS at 06:08

## 2018-02-17 NOTE — CP.PCM.PN
Subjective





- Date & Time of Evaluation


Date of Evaluation: 02/17/18


Time of Evaluation: 17:14





- Subjective


Subjective: 


PGY2 Medicine Note for Dr. Fung





Patient seen and examined at bedside with attending physician; patient is 

pleasant and interacting with family and us however does not make meaningful 

conversation; patient remains altered but agreeable; unable to obtain ROS





Objective





- Vital Signs/Intake and Output


Vital Signs (last 24 hours): 


 











Temp Pulse Resp BP Pulse Ox


 


 97.7 F   61   20   201/100 H  100 


 


 02/17/18 15:45  02/17/18 15:45  02/17/18 15:45  02/17/18 15:45  02/17/18 15:45








Intake and Output: 


 











 02/17/18 02/17/18





 06:59 18:59


 


Intake Total  630


 


Output Total 1450 


 


Balance -1450 630














- Medications


Medications: 


 Current Medications





Artificial Tears (Artificial Tears)  0 ml OD DAILY [facility]


   Last Admin: 02/17/18 10:18 Dose:  1 drop


Folic Acid (Folic Acid)  1 mg PO DAILY [facility]


   Last Admin: 02/17/18 10:18 Dose:  1 mg


Pantoprazole Sodium 80 mg/ (Sodium Chloride)  100 mls @ 10 mls/hr IVPB .Q10H [facility]


   PRN Reason: 8 MG/HR


   Last Admin: 02/17/18 01:50 Dose:  10 mls/hr


Piperacillin Sod/Tazobactam Sod (Zosyn 2.25 Gm Iv Premix)  2.25 gm in 50 mls @ 

100 mls/hr IVPB Q8H [facility]


   Last Admin: 02/17/18 14:08 Dose:  100 mls/hr


Insulin Human Regular (Novolin R)  0 unit SC ACHS [facility]


   PRN Reason: Protocol


   Last Admin: 02/17/18 12:50 Dose:  2 unit


Multivitamins (Hexavitamin)  1 tab PO DAILY [facility]


   Last Admin: 02/17/18 10:17 Dose:  1 tab


Neomycin Sulfate (Neomycin Tab)  500 mg PO Q6H [facility]


   Last Admin: 02/17/18 13:17 Dose:  500 mg


Ondansetron HCl (Zofran Inj)  4 mg IVP Q6 [facility]


   Last Admin: 02/14/18 14:43 Dose:  Not Given


Potassium Chloride (K-Dur 20 Meq Er Tab)  40 meq PO DAILY [facility]


   Last Admin: 02/17/18 10:18 Dose:  40 meq


Propranolol HCl (Inderal)  20 mg PO TID [facility]


   Last Admin: 02/17/18 13:16 Dose:  20 mg


Sucralfate (Carafate Oral Susp)  1 gm PO ACHS [facility]


   Last Admin: 02/17/18 12:50 Dose:  1 gm


Thiamine HCl (Vitamin B1 Tab)  100 mg PO DAILY [facility]


   Last Admin: 02/17/18 10:18 Dose:  100 mg











- Labs


Labs: 


 





 02/17/18 08:13 





 02/17/18 08:13 





 











PT  13.8 SECONDS (9.7-12.2)  H  02/13/18  04:37    


 


INR  1.2   02/13/18  04:37    


 


APTT  38 SECONDS (21-34)  H  02/13/18  04:37    














- Constitutional


Appears: Non-toxic, Chronically Ill





- Head Exam


Head Exam: ATRAUMATIC





- Eye Exam


Eye Exam: EOMI, Scleral icterus





- ENT Exam


ENT Exam: Mucous Membranes Moist





- Neck Exam


Neck Exam: Full ROM.  absent: Lymphadenopathy





- Respiratory Exam


Respiratory Exam: Clear to Ausculation Bilateral, NORMAL BREATHING PATTERN.  

absent: Rales, Rhonchi, Wheezes





- Cardiovascular Exam


Cardiovascular Exam: REGULAR RHYTHM, +S1, +S2





- GI/Abdominal Exam


GI & Abdominal Exam: Soft, Normal Bowel Sounds, Organomegaly.  absent: 

Tenderness





- Extremities Exam


Extremities Exam: Full ROM.  absent: Calf Tenderness





- Back Exam


Back Exam: NORMAL INSPECTION.  absent: CVA tenderness (L), CVA tenderness (R)





- Neurological Exam


Neurological Exam: Awake.  absent: Alert, Oriented x3





- Psychiatric Exam


Psychiatric exam: absent: Normal Affect





- Skin


Skin Exam: Warm





Assessment and Plan





- Assessment and Plan (Free Text)


Assessment: 





GI bleed; resolved and ruled out 


- GI consulted, Dr. Quintanilla; recs appreciated


- Endoscopy (1/26/18) showed candidiasis esophagitis, gastric ulcer (biopsied); 

erythematous mucosa in prepyloric region of stomach and erythematous 

duodenopathy. 


- Colonscopy (1/30/18) showed colitis, diverticulitis, internal hemorrhoids, 

and chronic inflammation. 


- Continue Protonix drip


- Continue NS@120mls/hr


- Started on Neomycin Sulfate 500mg PO q6h





Acute Renal Failure; chronic CKD 


- Upon Admission Cr. 4.6


- Today's Cr 1.8


- Ammonia 29


- Zestril and Lasix held as it could be contributing to Acute Kidney Injury


- Zosyn 2.25gm 





Hypertension;chronic 


- Lasix and Lisinopril held at this time.


- Overnight, patients BP was recorded as 202/99, after administration of 

propanolol, came down to 182/92.





Diabetes


- ISS


- Accuchecks ACHS





PPX


- Folic acid 1 mg PO daily


- Multivitamin 1 tab PO daily


- Thiamine 100mg PO daily


- Potassium 40meq PO daily (takes at home)





DISPO: Patient came to ER from [facility], discharge patient back to [facility] however 

patient family does not like last rehab center


will work with social work to get placed into different facility 


Patient was told to stop drinking; patient is unable to verbalize understaning 

however encouragement given to the family 


Case discussed with Dr. Fung; all management as per Dr. Fung

## 2018-02-17 NOTE — PN
DATE:



LOCATION:  [address] [place] [address] B.



SUBJECTIVE:  This is a 67 years old male, seen and examined in rounds early

this morning.  Appeared to be somewhat more awake with reported elevated

blood pressure early this morning but no reported active bleeding, no chest

pain or  palpitation, or reported chills or fever.  The entire chart is

reviewed including but not limited to the most recent lab and radiology

study results, current and previous medication list, current and previous

medical events.  Case was discussed with the staff at length.



Today's blood glucose level reported to be 180, and yesterday's ammonia

level reported to be 29, normal.  The patient still had low hemoglobin and

hematocrit with low albumin and low total protein due to his poor oral

intake.



PHYSICAL EXAMINATION:

GENERAL:  A 67 years old male.

VITAL SIGNS:  Afebrile with pulse of 66, respiratory rate 20 to 22, blood

pressure 186/102.

HEENT:  Showed pale dry oral mucous membranes.  Nonicteric sclerae.

LUNGS:  Few scattered crepitations, decreased air entry at bases.

HEART:  Positive S1 and S2.

ABDOMEN:  Soft.  Bowel sounds are present with mild distention.  No mass or

organomegaly but abdominal distention.  It has to be mentioned that the

patient experienced some episodes of nausea with marked dyspepsia during my

physical examination.

EXTREMITIES:  Mild lower extremity edematous changes.  No clubbing or

cyanosis.

NEUROLOGIC:  No reported new focal neurological deficits, sensory or motor.



IMPRESSION:

1.  Reported recent history of gastrointestinal bleeding, none since

endoscopy.  The patient had upper and lower endoscopy recently indicative

of gastric ulcer.  Esophagitis with esophageal candidiasis, diverticulosis

with mild colitis.

2.  Known history of hypertension.

3.  Poorly controlled diabetes mellitus.

4.  Known history of alcoholism.

5.  History of alcohol infused pancreatitis before.



SUGGESTIONS:

1.  Continue current management.

2.  Lactulose p.o.

3.  Advance diet gradually.

4.  No need for further aggressive GI workup in the meantime, and due to

the patient's anemia, repeat stool for occult blood is advised.





__________________________________________

eNri Griffin MD





DD:  02/17/2018 7:33:24

DT:  02/17/2018 9:48:53

Job # [id number]

## 2018-02-18 LAB
ALBUMIN SERPL-MCNC: 2.6 G/DL (ref 3.5–5)
ALBUMIN/GLOB SERPL: 0.8 {RATIO} (ref 1–2.1)
ALT SERPL-CCNC: 20 U/L (ref 21–72)
AST SERPL-CCNC: 22 U/L (ref 17–59)
BASOPHILS # BLD AUTO: 0.1 K/UL (ref 0–0.2)
BASOPHILS NFR BLD: 1.7 % (ref 0–2)
BUN SERPL-MCNC: 10 MG/DL (ref 9–20)
CALCIUM SERPL-MCNC: 8.7 MG/DL (ref 8.6–10.4)
EOSINOPHIL # BLD AUTO: 0.4 K/UL (ref 0–0.7)
EOSINOPHIL NFR BLD: 9.5 % (ref 0–4)
ERYTHROCYTE [DISTWIDTH] IN BLOOD BY AUTOMATED COUNT: 20.7 % (ref 11.5–14.5)
GFR NON-AFRICAN AMERICAN: 55
HGB BLD-MCNC: 9.3 G/DL (ref 12–18)
LYMPHOCYTES # BLD AUTO: 1.1 K/UL (ref 1–4.3)
LYMPHOCYTES NFR BLD AUTO: 26.7 % (ref 20–40)
MCH RBC QN AUTO: 28.5 PG (ref 27–31)
MCHC RBC AUTO-ENTMCNC: 32.7 G/DL (ref 33–37)
MCV RBC AUTO: 87.4 FL (ref 80–94)
MONOCYTES # BLD: 0.2 K/UL (ref 0–0.8)
MONOCYTES NFR BLD: 5.2 % (ref 0–10)
NEUTROPHILS # BLD: 2.4 K/UL (ref 1.8–7)
NEUTROPHILS NFR BLD AUTO: 56.9 % (ref 50–75)
NRBC BLD AUTO-RTO: 0.1 % (ref 0–2)
PLATELET # BLD: 139 K/UL (ref 130–400)
PMV BLD AUTO: 9 FL (ref 7.2–11.7)
RBC # BLD AUTO: 3.25 MIL/UL (ref 4.4–5.9)
WBC # BLD AUTO: 4.2 K/UL (ref 4.8–10.8)

## 2018-02-18 RX ADMIN — HUMAN INSULIN SCH: 100 INJECTION, SOLUTION SUBCUTANEOUS at 21:51

## 2018-02-18 RX ADMIN — TAZOBACTAM SODIUM AND PIPERACILLIN SODIUM SCH MLS/HR: 250; 2 INJECTION, SOLUTION INTRAVENOUS at 14:28

## 2018-02-18 RX ADMIN — HUMAN INSULIN SCH UNIT: 100 INJECTION, SOLUTION SUBCUTANEOUS at 17:30

## 2018-02-18 RX ADMIN — TAZOBACTAM SODIUM AND PIPERACILLIN SODIUM SCH MLS/HR: 250; 2 INJECTION, SOLUTION INTRAVENOUS at 22:03

## 2018-02-18 RX ADMIN — SUCRALFATE SCH GM: 1 SUSPENSION ORAL at 07:55

## 2018-02-18 RX ADMIN — HUMAN INSULIN SCH UNIT: 100 INJECTION, SOLUTION SUBCUTANEOUS at 12:16

## 2018-02-18 RX ADMIN — Medication SCH TAB: at 10:43

## 2018-02-18 RX ADMIN — POLYVINYL ALCOHOL SCH DROP: 14 SOLUTION/ DROPS OPHTHALMIC at 10:44

## 2018-02-18 RX ADMIN — TAZOBACTAM SODIUM AND PIPERACILLIN SODIUM SCH MLS/HR: 250; 2 INJECTION, SOLUTION INTRAVENOUS at 06:27

## 2018-02-18 RX ADMIN — SUCRALFATE SCH GM: 1 SUSPENSION ORAL at 21:47

## 2018-02-18 RX ADMIN — POTASSIUM CHLORIDE SCH MEQ: 20 TABLET, EXTENDED RELEASE ORAL at 10:44

## 2018-02-18 RX ADMIN — SUCRALFATE SCH GM: 1 SUSPENSION ORAL at 18:08

## 2018-02-18 RX ADMIN — SUCRALFATE SCH GM: 1 SUSPENSION ORAL at 10:44

## 2018-02-18 RX ADMIN — HUMAN INSULIN SCH UNIT: 100 INJECTION, SOLUTION SUBCUTANEOUS at 07:54

## 2018-02-18 NOTE — PN
LOCATION:  [facility], Bed B.



SUBJECTIVE:  This is a 67-year-old male, seen and examined in rounds

without significant clinical changes, denying any chest pain, significant

complaint of shortness of breath or palpitation.  No nausea or vomiting. 

The entire chart is reviewed including but not limited to the most recent

lab and radiology study results, current and previous medication list,

current and previous medical events.  Today's blood glucose level of 193,

rest of the labs still pending; however, the patient still has low

hemoglobin and hematocrit with low CO2 content indicative of metabolic

acidosis with low albumin, most likely secondary to poor oral intake and

malnutrition.



PHYSICAL EXAMINATION:

GENERAL:  A 67-year-old male seen and examined in the presence of his own

family without reported significant clinical changes.

HEENT:  Showed pale dry oral mucous membranes.  Nonicteric sclerae.

LUNGS:  Few scattered crepitations, decreased air entry at bases.

HEART:  Positive S1 and S2.

ABDOMEN:  Soft with mild distention with generalized mild tenderness.  No

mass or organomegaly.  No rebound tenderness or guarding.

EXTREMITIES:  Mild lower extremity edematous changes.  No clubbing or

cyanosis.

NEUROLOGIC:  No new reported neurological deficits, sensory or motor.  The

patient is still somewhat agitated and restless at times.



IMPRESSION:

1.  Recent history of gastrointestinal blood loss, stable clinically.

2.  Chronic renal disease.

3.  Known history of hypertension with diabetes mellitus.

4.  Anemia secondary to above.

5.  Known history of esophageal candidiasis, diverticulosis with mild

colitis by recent endoscopic studies.

6.  Known history of alcoholism.

7.  Recurrent alcohol-induced pancreatitis by history.



SUGGESTIONS:

1.  Continue current management.

2.  Repeat ammonia level with serum lipase and amylase level.

3.  Repeat stool for occult blood.

4.  Further recommendation to follow.





__________________________________________

Neri Griffin MD



DD:  02/18/2018 11:20:18

DT:  02/18/2018 12:37:24

Job # [id number]

## 2018-02-18 NOTE — CP.PCM.PN
Subjective





- Date & Time of Evaluation


Date of Evaluation: 02/18/18


Time of Evaluation: 13:44





- Subjective


Subjective: 


PGY2 Medicine Note for Dr. Fung





patient seen and examined at bedside with attending physician; denies all 

complaints however not Anox3 and not a reliable historian; spoke with family 

about plans to send to different rehab facility and will work with case 

management to find a place that is ok with the family. unable to obtain 

reliable ROS 2/2 to condition. 





Objective





- Vital Signs/Intake and Output


Vital Signs (last 24 hours): 


 











Temp Pulse Resp BP Pulse Ox


 


 97.3 F L  61   20   161/89 H  100 


 


 02/18/18 08:00  02/18/18 08:00  02/18/18 08:00  02/18/18 08:00  02/18/18 08:00








Intake and Output: 


 











 02/18/18 02/18/18





 06:59 18:59


 


Intake Total 330 


 


Output Total 600 


 


Balance -270 














- Medications


Medications: 


 Current Medications





Artificial Tears (Artificial Tears)  0 ml OD DAILY [facility]


   Last Admin: 02/18/18 10:44 Dose:  1 drop


Folic Acid (Folic Acid)  1 mg PO DAILY [facility]


   Last Admin: 02/18/18 10:43 Dose:  1 mg


Pantoprazole Sodium 80 mg/ (Sodium Chloride)  100 mls @ 10 mls/hr IVPB .Q10H [facility]


   PRN Reason: 8 MG/HR


   Last Admin: 02/18/18 04:04 Dose:  10 mls/hr


Piperacillin Sod/Tazobactam Sod (Zosyn 2.25 Gm Iv Premix)  2.25 gm in 50 mls @ 

100 mls/hr IVPB Q8H [facility]


   Last Admin: 02/18/18 06:27 Dose:  100 mls/hr


Insulin Human Regular (Novolin R)  0 unit SC ACHS [facility]


   PRN Reason: Protocol


   Last Admin: 02/18/18 12:16 Dose:  2 unit


Multivitamins (Hexavitamin)  1 tab PO DAILY [facility]


   Last Admin: 02/18/18 10:43 Dose:  1 tab


Neomycin Sulfate (Neomycin Tab)  500 mg PO Q6H [facility]


   Last Admin: 02/18/18 07:59 Dose:  500 mg


Ondansetron HCl (Zofran Inj)  4 mg IVP Q6 [facility]


   Last Admin: 02/14/18 14:43 Dose:  Not Given


Potassium Chloride (K-Dur 20 Meq Er Tab)  40 meq PO DAILY [facility]


   Last Admin: 02/18/18 10:44 Dose:  40 meq


Propranolol HCl (Inderal)  20 mg PO TID [facility]


   Last Admin: 02/18/18 10:44 Dose:  20 mg


Sucralfate (Carafate Oral Susp)  1 gm PO ACHS [facility]


   Last Admin: 02/18/18 10:44 Dose:  1 gm


Thiamine HCl (Vitamin B1 Tab)  100 mg PO DAILY [facility]


   Last Admin: 02/18/18 10:43 Dose:  100 mg











- Labs


Labs: 


 





 02/18/18 11:28 





 02/18/18 11:28 





 











PT  13.8 SECONDS (9.7-12.2)  H  02/13/18  04:37    


 


INR  1.2   02/13/18  04:37    


 


APTT  38 SECONDS (21-34)  H  02/13/18  04:37    














Assessment and Plan





- Assessment and Plan (Free Text)


Assessment: 





Appears: Non-toxic, Chronically Ill





- Head Exam


Head Exam: ATRAUMATIC





- Eye Exam


Eye Exam: EOMI, Scleral icterus





- ENT Exam


ENT Exam: Mucous Membranes Moist





- Neck Exam


Neck Exam: Full ROM.  absent: Lymphadenopathy





- Respiratory Exam


Respiratory Exam: Clear to Ausculation Bilateral, NORMAL BREATHING PATTERN.  

absent: Rales, Rhonchi, Wheezes





- Cardiovascular Exam


Cardiovascular Exam: REGULAR RHYTHM, +S1, +S2





- GI/Abdominal Exam


GI & Abdominal Exam: Soft, Normal Bowel Sounds, Organomegaly.  absent: 

Tenderness





- Extremities Exam


Extremities Exam: Full ROM.  absent: Calf Tenderness





- Back Exam


Back Exam: NORMAL INSPECTION.  absent: CVA tenderness (L), CVA tenderness (R)





- Neurological Exam


Neurological Exam: Awake.  absent: Alert, Oriented x3





- Psychiatric Exam


Psychiatric exam: absent: Normal Affect





- Skin


Skin Exam: Warm





Assessment and Plan





- Assessment and Plan (Free Text)


Assessment: 





GI bleed; resolved and ruled out 


- GI consulted, Dr. Quintanilla; recs appreciated


- Endoscopy (1/26/18) showed candidiasis esophagitis, gastric ulcer (biopsied); 

erythematous mucosa in prepyloric region of stomach and erythematous 

duodenopathy. 


- Colonscopy (1/30/18) showed colitis, diverticulitis, internal hemorrhoids, 

and chronic inflammation. 


- Continue Protonix drip


- Continue NS@120mls/hr


- Started on Neomycin Sulfate 500mg PO q6h





Acute Renal Failure; chronic CKD 


- Upon Admission Cr. 4.6


- Today's Cr 1.8


- Ammonia 29


- Zestril and Lasix held as it could be contributing to Acute Kidney Injury


- Zosyn 2.25gm 





Hypertension;chronic 


- Lasix and Lisinopril held at this time.


- Overnight, patients BP was recorded as 202/99, after administration of 

propanolol, came down to 182/92.





Diabetes


- ISS


- Accuchecks ACHS





PPX


- Folic acid 1 mg PO daily


- Multivitamin 1 tab PO daily


- Thiamine 100mg PO daily


- Potassium 40meq PO daily (takes at home)





DISPO: Patient came to ER from [facility], discharge patient back to [facility] however 

patient family does not like last rehab center


will work with social work to get placed into different facility 


Patient was told to stop drinking; patient is unable to verbalize understaning 

however encouragement given to the family 


Case discussed with Dr. Fung; all management as per Dr. Fung





The patient is stable for d/c as soon as [facility] is set up for patient

## 2018-02-19 LAB
ALBUMIN SERPL-MCNC: 2.7 G/DL (ref 3.5–5)
ALBUMIN/GLOB SERPL: 0.8 {RATIO} (ref 1–2.1)
ALT SERPL-CCNC: 22 U/L (ref 21–72)
AST SERPL-CCNC: 26 U/L (ref 17–59)
BASOPHILS # BLD AUTO: 0.1 K/UL (ref 0–0.2)
BASOPHILS NFR BLD: 1.9 % (ref 0–2)
BUN SERPL-MCNC: 8 MG/DL (ref 9–20)
CALCIUM SERPL-MCNC: 8.9 MG/DL (ref 8.6–10.4)
EOSINOPHIL # BLD AUTO: 0.4 K/UL (ref 0–0.7)
EOSINOPHIL NFR BLD: 7.8 % (ref 0–4)
ERYTHROCYTE [DISTWIDTH] IN BLOOD BY AUTOMATED COUNT: 20.8 % (ref 11.5–14.5)
GFR NON-AFRICAN AMERICAN: 55
HGB BLD-MCNC: 9.8 G/DL (ref 12–18)
LYMPHOCYTES # BLD AUTO: 1.1 K/UL (ref 1–4.3)
LYMPHOCYTES NFR BLD AUTO: 24 % (ref 20–40)
MCH RBC QN AUTO: 28.6 PG (ref 27–31)
MCHC RBC AUTO-ENTMCNC: 32.5 G/DL (ref 33–37)
MCV RBC AUTO: 88 FL (ref 80–94)
MONOCYTES # BLD: 0.3 K/UL (ref 0–0.8)
MONOCYTES NFR BLD: 5.4 % (ref 0–10)
NEUTROPHILS # BLD: 2.9 K/UL (ref 1.8–7)
NEUTROPHILS NFR BLD AUTO: 60.9 % (ref 50–75)
NRBC BLD AUTO-RTO: 0.1 % (ref 0–2)
PLATELET # BLD: 125 K/UL (ref 130–400)
PMV BLD AUTO: 8.9 FL (ref 7.2–11.7)
RBC # BLD AUTO: 3.42 MIL/UL (ref 4.4–5.9)
WBC # BLD AUTO: 4.8 K/UL (ref 4.8–10.8)

## 2018-02-19 RX ADMIN — TAZOBACTAM SODIUM AND PIPERACILLIN SODIUM SCH MLS/HR: 250; 2 INJECTION, SOLUTION INTRAVENOUS at 06:49

## 2018-02-19 RX ADMIN — SUCRALFATE SCH GM: 1 SUSPENSION ORAL at 21:33

## 2018-02-19 RX ADMIN — HUMAN INSULIN SCH UNIT: 100 INJECTION, SOLUTION SUBCUTANEOUS at 08:21

## 2018-02-19 RX ADMIN — Medication SCH TAB: at 10:08

## 2018-02-19 RX ADMIN — SUCRALFATE SCH GM: 1 SUSPENSION ORAL at 12:59

## 2018-02-19 RX ADMIN — HUMAN INSULIN SCH: 100 INJECTION, SOLUTION SUBCUTANEOUS at 21:31

## 2018-02-19 RX ADMIN — HUMAN INSULIN SCH UNIT: 100 INJECTION, SOLUTION SUBCUTANEOUS at 17:05

## 2018-02-19 RX ADMIN — HUMAN INSULIN SCH UNIT: 100 INJECTION, SOLUTION SUBCUTANEOUS at 12:59

## 2018-02-19 RX ADMIN — POTASSIUM CHLORIDE SCH MEQ: 20 TABLET, EXTENDED RELEASE ORAL at 10:08

## 2018-02-19 RX ADMIN — SUCRALFATE SCH GM: 1 SUSPENSION ORAL at 08:21

## 2018-02-19 RX ADMIN — TAZOBACTAM SODIUM AND PIPERACILLIN SODIUM SCH MLS/HR: 250; 2 INJECTION, SOLUTION INTRAVENOUS at 14:23

## 2018-02-19 RX ADMIN — SUCRALFATE SCH GM: 1 SUSPENSION ORAL at 17:04

## 2018-02-19 NOTE — PN
DATE:



LOCATION:  , [facility] B.



SUBJECTIVE:  This is a 67-year-old male seen and examined in rounds in the

presence of his son as well as the nursing staff in the floor.  The entire

chart is reviewed including but not limited to the most recent lab and

radiology study results, current and the previous medication list, current

and the previous medical events.  A list of medications as well as all the

possible next step at treatment discussed with the son over 35 to 40

minutes again.



The patient still with periods of confusion, with poor oral intake, with

periods of elevated blood pressure.



The entire medication list was re-evaluated with the patient's son again.



LABORATORY DATA:  Today's lab showed hemoglobin 9.8, hematocrit 30.1, with

thrombocytopenia of 125.  Low CO2 content 18, blood glucose level 196,

albumin 2.7, total protein 6.1.



The patient has very poor oral intake and only clear liquid diet with

persistent _____.



PHYSICAL EXAMINATION:

GENERAL:  A 67-year-old male, who appeared to be sleepy.

VITAL SIGNS:  Afebrile with pulse of 70, respiratory rate 20 to 22, blood

pressure 192/92.

HEENT:  Showed pale dry oral mucous membranes.  Nonicteric sclerae.

LUNGS:  Few scattered crepitations, decreased air entry at bases.

HEART:  Positive S1 and S2.

ABDOMEN:  Bowel sounds are present with mild generalized tenderness and

slight distention.  No mass or organomegaly and no rebound tenderness or

guarding.

EXTREMITIES:  Mild lower extremity edematous changes.  No clubbing or

cyanosis.

NEUROLOGIC:  No reported new neurological deficits, sensory or motor and

some complaint that the patient is unable to walk alone due to lower

extremity weakness.



IMPRESSION:

1.  Alcoholism with alcoholic liver disease.

2.  Anemia with reported gastrointestinal blood loss, subsided.

3.  Poorly controlled hypertension.

4.  Poorly controlled diabetes mellitus.

5.  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

6.  Known history of alcoholic pancreatitis, renal insufficiency.

7.  Thrombocytopenia secondary to above.



SUGGESTIONS:

1.  Agree with your plan.

2.  Peripheral hyperalimentation due to the patient's malnutrition,

otherwise the patient is a candidate for PEG insertion due to his very poor

oral intake and his malnutrition with hypoalbuminemia, hyperlipidemia, and

potential high risk of aspiration pneumonia that will be discussed at

length with the admitting MD.





__________________________________________

Neri Griffin MD



DD:  02/19/2018 12:15:11

DT:  02/19/2018 15:17:34

[facility] # [id number]

## 2018-02-19 NOTE — CP.PCM.PN
Subjective





- Date & Time of Evaluation


Date of Evaluation: 02/19/18


Time of Evaluation: 07:15





- Subjective


Subjective: 





PGY 1 Medicine Note for Dr. Fung





Patient seen and examined at bedside this morning. Overnight, the patients BP 

went up to 223/123, however he was asymptomatic. Hydrazaline was administered 

and his BP came down to 187/95. No further orders were made as the patient was 

not symptomatic. Currently, patient is comfortable and knows where he is and 

what day and year it is. Patient is eating well. Patient complains of continued 

mild abdominal ache's. As per the nurse, patients family would like for him to 

go to a different [facility] than he was previously in. He denies fevers, nausea, 

vomiting, hematochezia, Diarrhea, constipation, chest pain, or SOB. 








Objective





- Vital Signs/Intake and Output


Vital Signs (last 24 hours): 


 











Temp Pulse Resp BP Pulse Ox


 


 97.6 F   67   18   190/95 H  99 


 


 02/19/18 08:02  02/19/18 08:55  02/19/18 08:02  02/19/18 08:02  02/19/18 08:02








Intake and Output: 


 











 02/19/18 02/19/18





 06:59 18:59


 


Intake Total 680 


 


Output Total 1150 


 


Balance -470 














- Medications


Medications: 


 Current Medications





Artificial Tears (Artificial Tears)  0 ml OD DAILY [facility]


   Last Admin: 02/18/18 10:44 Dose:  1 drop


Folic Acid (Folic Acid)  1 mg PO DAILY [facility]


   Last Admin: 02/19/18 10:08 Dose:  1 mg


Pantoprazole Sodium 80 mg/ (Sodium Chloride)  100 mls @ 10 mls/hr IVPB .Q10H [facility]


   PRN Reason: 8 MG/HR


   Last Admin: 02/19/18 13:10 Dose:  10 mls/hr


Piperacillin Sod/Tazobactam Sod (Zosyn 2.25 Gm Iv Premix)  2.25 gm in 50 mls @ 

100 mls/hr IVPB Q8H [facility]


   Last Admin: 02/19/18 06:49 Dose:  100 mls/hr


Insulin Human Regular (Novolin R)  0 unit SC ACHS [facility]


   PRN Reason: Protocol


   Last Admin: 02/19/18 12:59 Dose:  4 unit


Lisinopril (Zestril)  40 mg PO DAILY [facility]


Multivitamins (Hexavitamin)  1 tab PO DAILY [facility]


   Last Admin: 02/19/18 10:08 Dose:  1 tab


Neomycin Sulfate (Neomycin Tab)  500 mg PO Q6H [facility]


   Last Admin: 02/19/18 13:11 Dose:  500 mg


Ondansetron HCl (Zofran Inj)  4 mg IVP Q6 [facility]


   Last Admin: 02/14/18 14:43 Dose:  Not Given


Potassium Chloride (K-Dur 20 Meq Er Tab)  40 meq PO DAILY [facility]


   Last Admin: 02/19/18 10:08 Dose:  40 meq


Propranolol HCl (Inderal)  20 mg PO TID [facility]


   Last Admin: 02/19/18 13:11 Dose:  20 mg


Sucralfate (Carafate Oral Susp)  1 gm PO ACHS [facility]


   Last Admin: 02/19/18 12:59 Dose:  1 gm


Thiamine HCl (Vitamin B1 Tab)  100 mg PO DAILY [facility]


   Last Admin: 02/19/18 10:11 Dose:  100 mg











- Labs


Labs: 


 





 02/19/18 07:45 





 02/19/18 07:45 





 











PT  13.8 SECONDS (9.7-12.2)  H  02/13/18  04:37    


 


INR  1.2   02/13/18  04:37    


 


APTT  38 SECONDS (21-34)  H  02/13/18  04:37    














- Constitutional


Appears: Non-toxic, No Acute Distress





- Head Exam


Head Exam: NORMOCEPHALIC





- Eye Exam


Eye Exam: Normal appearance





- ENT Exam


ENT Exam: Mucous Membranes Moist





- Respiratory Exam


Respiratory Exam: Clear to Ausculation Bilateral, NORMAL BREATHING PATTERN.  

absent: Accessory Muscle Use, Respiratory Distress





- Cardiovascular Exam


Cardiovascular Exam: REGULAR RHYTHM, +S1, +S2





- GI/Abdominal Exam


GI & Abdominal Exam: Soft, Normal Bowel Sounds.  absent: Distended, Firm, 

Guarding, Rigid, Tenderness





- Extremities Exam


Extremities Exam: absent: Calf Tenderness, Pedal Edema





- Neurological Exam


Neurological Exam: Alert, Awake, Oriented x3 (appears confused but states clear 

understanding when spoken to in native language.)





- Psychiatric Exam


Psychiatric exam: Normal Affect, Normal Mood





- Skin


Skin Exam: Dry, Warm





Assessment and Plan





- Assessment and Plan (Free Text)


Plan: 





GI bleed; resolved and ruled out 


- GI consulted, Dr. Quintanilla; recs appreciated


- Endoscopy (1/26/18) showed candidiasis esophagitis, gastric ulcer (biopsied); 

erythematous mucosa in prepyloric region of stomach and erythematous 

duodenopathy. 


- Colonscopy (1/30/18) showed colitis, diverticulitis, internal hemorrhoids, 

and chronic inflammation. 


- Continue Protonix drip


- Continue NS@120mls/hr


- Started on Neomycin Sulfate 500mg PO q6h





Acute Renal Failure; chronic CKD 


- Upon Admission Cr. 4.6


- Today's Cr 1.3


- Ammonia 29


- Lisinopril and Lasix held as it could be contributing to Acute Kidney Injury


* Restarted Lisinopril @20mg daily in AM (home dose 40mg)


* Started Amlodipine 5mg PO HS





Hypertension;chronic 


- Lasix and Lisinopril held at this time.


- Overnight, patients BP was recorded as 210/111, after administration of 

propanolol, came down to 182/92.


* Restarted Lisinopril @20mg daily in AM (home dose 40mg)


* Started Amlodipine 5mg PO HS





Diabetes


- ISS


- Accuchecks ACHS





PPX


- Folic acid 1 mg PO daily


- Multivitamin 1 tab PO daily


- Thiamine 100mg PO daily


- Potassium 40meq PO daily (takes at home)





DISPO: Patient came to ER from [facility], discharge patient back to [facility] however 

patient family does not like last rehab center and refuses to let him go back. 

Will work with social work to get placed into different facility. Patient was 

told to stop drinking; patient is unable to verbalize understanding however 

encouragement given to the family. The patient is stable for d/c as soon as [facility] 

is set up for patient .





Case discussed with Dr. Antonieta Gamble Alex PGY1

## 2018-02-19 NOTE — PN
LOCATION:  [facility], bed B.



SUBJECTIVE:  This is a 67-year-old male, seen and examined with the staff

on the floor today, appeared to be agitated, somewhat lethargic and _____

respond early this morning and no reported active bleeding but

semi-confusion and mild disorientation.  The entire chart is reviewed

including, but not limited to the most recent lab and radiology study

results, current and the previous medication list, current and the previous

medical events.  The patient still has low hemoglobin of 9.2, hematocrit

28.4, with normal white blood cells, normal platelet counts, with low CO2

content of 19 indicative of metabolic acidosis.  Creatinine elevated to

1.8, blood glucose level 261, low calcium 8.3, with low albumin 2.8, low

total protein of 6.0.



PHYSICAL EXAMINATION:

GENERAL:  A 67-year-old male seen and examined in the presence of family

members at the bedside without reported active bleeding at that time

VITAL SIGNS:  Afebrile with pulse of 68, respiratory rate 20 to 22, and

blood pressure 190/82.

HEENT:  Showed pale dry oral mucous membrane, nonicteric sclerae.

LUNGS:  Scattered crepitation, decreased air entry at bases.

HEART:  Positive S1 and S2.

ABDOMEN:  Soft with mild distention with slight generalized tenderness.  No

mass or organomegaly.  No rebound tenderness or guarding, but mild

distention.

EXTREMITIES:  With lower extremities mild edematous changes.  No clubbing

or cyanosis.

NEUROLOGIC:  No reported neurological deficits reported as per the nursing

staff statement.  No focal deficit reported.



IMPRESSION:

1.  Alcoholism with alcoholic liver disease.

2.  To rule out early stage of hepatic encephalopathy.

3.  Poorly controlled hypertension.

4.  Peptic ulcer disease.

5.  Known history of alcoholic pancreatitis and renal insufficiency.

6.  Known history of diabetes mellitus.

7.  Anemia secondary to above.



SUGGESTIONS:

1.  Continue current management.

2.  Neomycin p.o.

3.  Repeat ammonia level.

4.  No aggressive gastrointestinal workup in the meantime until the patient

is more stable clinically.





__________________________________________

Neri Griffin MD



D:  02/16/2018 12:42:43

DT:  02/16/2018 13:29:58

[facility] # [id number]

## 2018-02-20 LAB
APTT BLD: 38 SECONDS (ref 21–34)
INR PPP: 1.3
PROTHROMBIN TIME: 15.2 SECONDS (ref 9.7–12.2)

## 2018-02-20 RX ADMIN — HUMAN INSULIN SCH: 100 INJECTION, SOLUTION SUBCUTANEOUS at 08:15

## 2018-02-20 RX ADMIN — SUCRALFATE SCH GM: 1 SUSPENSION ORAL at 13:22

## 2018-02-20 RX ADMIN — POLYVINYL ALCOHOL SCH DROP: 14 SOLUTION/ DROPS OPHTHALMIC at 13:23

## 2018-02-20 RX ADMIN — POTASSIUM CHLORIDE SCH MEQ: 20 TABLET, EXTENDED RELEASE ORAL at 13:22

## 2018-02-20 RX ADMIN — SUCRALFATE SCH GM: 1 SUSPENSION ORAL at 22:41

## 2018-02-20 RX ADMIN — HUMAN INSULIN SCH: 100 INJECTION, SOLUTION SUBCUTANEOUS at 16:39

## 2018-02-20 RX ADMIN — HUMAN INSULIN SCH: 100 INJECTION, SOLUTION SUBCUTANEOUS at 12:00

## 2018-02-20 RX ADMIN — SUCRALFATE SCH: 1 SUSPENSION ORAL at 17:04

## 2018-02-20 RX ADMIN — HUMAN INSULIN SCH: 100 INJECTION, SOLUTION SUBCUTANEOUS at 22:02

## 2018-02-20 RX ADMIN — Medication SCH TAB: at 13:23

## 2018-02-20 RX ADMIN — SUCRALFATE SCH: 1 SUSPENSION ORAL at 06:46

## 2018-02-20 NOTE — CP.PCM.PN
Subjective





- Date & Time of Evaluation


Date of Evaluation: 02/20/18


Time of Evaluation: 09:45





- Subjective


Subjective: 





PGY1 Medicine Note for Dr. Fung





Patient seen and examined at bedside this morning. Currently, the patient is 

comfortable but seems to be in and out of altered mental status. Patient 

appears much more confused today compared to yesterday. Patients oral intake is 

poor and he complains of continued mild abdominal aches. As per GI, PEG 

insertion or Peripheral hyperailimentation will benefit patient due to patients 

poor oral intake, aspiration risk, and poor nutritional status. SAI plans are 

being developed by [facility].  He denies fevers, nausea, vomiting, hematochezia, 

Diarrhea, constipation, chest pain, or SOB. 





Objective





- Vital Signs/Intake and Output


Vital Signs (last 24 hours): 


 











Temp Pulse Resp BP Pulse Ox


 


 97.6 F   62   20   153/74 H  96 


 


 02/20/18 07:18  02/20/18 07:18  02/20/18 07:18  02/20/18 07:18  02/20/18 07:18








Intake and Output: 


 











 02/20/18 02/20/18





 06:59 18:59


 


Intake Total 300 


 


Balance 300 














- Medications


Medications: 


 Current Medications





Amlodipine Besylate (Norvasc)  5 mg PO Q24H [facility]


   Last Admin: 02/19/18 19:50 Dose:  5 mg


Artificial Tears (Artificial Tears)  0 ml OD DAILY [facility]


   Last Admin: 02/20/18 13:23 Dose:  2 drop


Folic Acid (Folic Acid)  1 mg PO DAILY [facility]


   Last Admin: 02/20/18 13:23 Dose:  1 mg


Pantoprazole Sodium 80 mg/ (Sodium Chloride)  100 mls @ 10 mls/hr IVPB .Q10H [facility]


   PRN Reason: 8 MG/HR


   Last Admin: 02/20/18 02:28 Dose:  10 mls/hr


Insulin Human Regular (Novolin R)  0 unit SC ACHS [facility]


   PRN Reason: Protocol


   Last Admin: 02/19/18 21:31 Dose:  Not Given


Lisinopril (Zestril)  20 mg PO DAILY [facility]


   Last Admin: 02/20/18 13:22 Dose:  20 mg


Multivitamins (Hexavitamin)  1 tab PO DAILY [facility]


   Last Admin: 02/20/18 13:23 Dose:  1 tab


Neomycin Sulfate (Neomycin Tab)  500 mg PO Q6H [facility]


   Last Admin: 02/20/18 13:22 Dose:  500 mg


Ondansetron HCl (Zofran Inj)  4 mg IVP Q6 [facility]


   Last Admin: 02/14/18 14:43 Dose:  Not Given


Potassium Chloride (K-Dur 20 Meq Er Tab)  40 meq PO DAILY [facility]


   Last Admin: 02/20/18 13:22 Dose:  40 meq


Propranolol HCl (Inderal)  20 mg PO TID [facility]


   Last Admin: 02/20/18 13:22 Dose:  20 mg


Sucralfate (Carafate Oral Susp)  1 gm PO ACHS [facility]


   Last Admin: 02/20/18 13:22 Dose:  1 gm


Thiamine HCl (Vitamin B1 Tab)  100 mg PO DAILY [facility]


   Last Admin: 02/20/18 13:23 Dose:  100 mg











- Labs


Labs: 


 





 02/19/18 07:45 





 02/19/18 07:45 





 











PT  15.2 SECONDS (9.7-12.2)  H  02/20/18  08:00    


 


INR  1.3   02/20/18  08:00    


 


APTT  38 SECONDS (21-34)  H  02/20/18  08:00    














- Constitutional


Appears: Non-toxic, No Acute Distress





- Head Exam


Head Exam: ATRAUMATIC, NORMOCEPHALIC





- Eye Exam


Eye Exam: EOMI


Pupil Exam: PERRL





- ENT Exam


ENT Exam: Mucous Membranes Moist





- Respiratory Exam


Respiratory Exam: Clear to Ausculation Bilateral, NORMAL BREATHING PATTERN.  

absent: Accessory Muscle Use, Rales, Rhonchi, Wheezes, Respiratory Distress





- Cardiovascular Exam


Cardiovascular Exam: REGULAR RHYTHM, +S1, +S2





- GI/Abdominal Exam


GI & Abdominal Exam: Soft, Normal Bowel Sounds.  absent: Distended, Firm, 

Guarding, Rigid, Tenderness





- Extremities Exam


Extremities Exam: absent: Calf Tenderness, Pedal Edema





- Neurological Exam


Neurological Exam: Alert, Altered, Awake.  absent: Oriented x3





- Psychiatric Exam


Psychiatric exam: absent: Normal Affect, Normal Mood





- Skin


Skin Exam: Dry, Warm





Assessment and Plan





- Assessment and Plan (Free Text)


Plan: 





GI bleed; resolved and ruled out 


- GI consulted, Dr. Quintanilla; recs appreciated


- Endoscopy (1/26/18) showed candidiasis esophagitis, gastric ulcer (biopsied); 

erythematous mucosa in prepyloric region of stomach and erythematous 

duodenopathy. 


- Colonscopy (1/30/18) showed colitis, diverticulitis, internal hemorrhoids, 

and chronic inflammation. 


- Continue Protonix drip


- Continue NS@120mls/hr


- Neomycin Sulfate 500mg PO q6h


- Per GI note, patient may be candidate for possible PEG.





Acute Renal Failure; chronic CKD 


- Upon Admission Cr. 4.6


- Yesterday's Cr 1.3; f/u todays


- Ammonia 29


- Lisinopril and Lasix held as it could be contributing to Acute Kidney Injury


* Restarted Lisinopril @20mg daily in AM (home dose 40mg)


* Started Amlodipine 5mg PO HS





Hypertension;chronic 


- Lasix and Lisinopril held at this time.


- Overnight, patients BP was recorded as 210/111, after administration of 

propanolol, came down to 182/92.


* Restarted Lisinopril @20mg daily in AM (home dose 40mg)


* Started Amlodipine 5mg PO HS





Diabetes


- ISS


- Accuchecks ACHS





PPX


- Folic acid 1 mg PO daily


- Multivitamin 1 tab PO daily


- Thiamine 100mg PO daily


- Potassium 40meq PO daily (takes at home)





DISPO: Patient came to ER from [facility], discharge patient back to [facility] however 

patient family does not like last rehab center and refuses to let him go back. 

Working with social work to get placed into different facility. Patient was 

told to stop drinking; patient is unable to verbalize understanding however 

encouragement given to the family. The patient is stable for d/c as soon as [facility] 

is set up for patient .





Case discussed with Dr. Antonieta Gamble Alex PGY1

## 2018-02-21 LAB
ALBUMIN SERPL-MCNC: 2.7 G/DL (ref 3.5–5)
ALBUMIN/GLOB SERPL: 0.9 {RATIO} (ref 1–2.1)
ALT SERPL-CCNC: 24 U/L (ref 21–72)
AST SERPL-CCNC: 28 U/L (ref 17–59)
BASOPHILS # BLD AUTO: 0.1 K/UL (ref 0–0.2)
BASOPHILS NFR BLD: 1.5 % (ref 0–2)
BUN SERPL-MCNC: 6 MG/DL (ref 9–20)
CALCIUM SERPL-MCNC: 9.5 MG/DL (ref 8.6–10.4)
EOSINOPHIL # BLD AUTO: 0.3 K/UL (ref 0–0.7)
EOSINOPHIL NFR BLD: 6.4 % (ref 0–4)
ERYTHROCYTE [DISTWIDTH] IN BLOOD BY AUTOMATED COUNT: 21.7 % (ref 11.5–14.5)
GFR NON-AFRICAN AMERICAN: > 60
HGB BLD-MCNC: 10 G/DL (ref 12–18)
LYMPHOCYTES # BLD AUTO: 1.1 K/UL (ref 1–4.3)
LYMPHOCYTES NFR BLD AUTO: 25.7 % (ref 20–40)
MCH RBC QN AUTO: 28.2 PG (ref 27–31)
MCHC RBC AUTO-ENTMCNC: 32.3 G/DL (ref 33–37)
MCV RBC AUTO: 87.1 FL (ref 80–94)
MONOCYTES # BLD: 0.2 K/UL (ref 0–0.8)
MONOCYTES NFR BLD: 5.5 % (ref 0–10)
NEUTROPHILS # BLD: 2.6 K/UL (ref 1.8–7)
NEUTROPHILS NFR BLD AUTO: 60.9 % (ref 50–75)
NRBC BLD AUTO-RTO: 0.1 % (ref 0–2)
PLATELET # BLD: 95 K/UL (ref 130–400)
PMV BLD AUTO: 8.7 FL (ref 7.2–11.7)
RBC # BLD AUTO: 3.56 MIL/UL (ref 4.4–5.9)
WBC # BLD AUTO: 4.3 K/UL (ref 4.8–10.8)

## 2018-02-21 RX ADMIN — HUMAN INSULIN SCH UNIT: 100 INJECTION, SOLUTION SUBCUTANEOUS at 17:02

## 2018-02-21 RX ADMIN — POLYVINYL ALCOHOL SCH: 14 SOLUTION/ DROPS OPHTHALMIC at 09:42

## 2018-02-21 RX ADMIN — HUMAN INSULIN SCH UNIT: 100 INJECTION, SOLUTION SUBCUTANEOUS at 12:27

## 2018-02-21 RX ADMIN — SUCRALFATE SCH GM: 1 SUSPENSION ORAL at 06:44

## 2018-02-21 RX ADMIN — SUCRALFATE SCH GM: 1 SUSPENSION ORAL at 21:54

## 2018-02-21 RX ADMIN — POTASSIUM CHLORIDE SCH MEQ: 20 TABLET, EXTENDED RELEASE ORAL at 09:42

## 2018-02-21 RX ADMIN — HUMAN INSULIN SCH: 100 INJECTION, SOLUTION SUBCUTANEOUS at 21:39

## 2018-02-21 RX ADMIN — SUCRALFATE SCH GM: 1 SUSPENSION ORAL at 17:55

## 2018-02-21 RX ADMIN — SUCRALFATE SCH GM: 1 SUSPENSION ORAL at 12:27

## 2018-02-21 RX ADMIN — Medication SCH TAB: at 09:41

## 2018-02-21 RX ADMIN — HUMAN INSULIN SCH UNIT: 100 INJECTION, SOLUTION SUBCUTANEOUS at 08:27

## 2018-02-21 NOTE — PN
DATE:



LOCATION:  [facility], Bed B.



SUBJECTIVE:  This is a 68-year-old male, seen and examined in rounds, was

_____ the floor, appeared to be somewhat more awake and alert with less

liquid oral intake with period of mild disorientation and agitation with

period of poorly controlled hypertension, but no reported chest pain,

palpation, tremor, chills, or fever.



The entire chart is reviewed including, but not limited to the most recent

lab and radiologic study results, current and previous medication list,

current and previous medical events.



Today's lab showed white blood cells of 23, hemoglobin 10, hematocrit 31.1,

thrombocytopenia of 95 with CO2 content of 21 and negative for metabolic

acidosis, but normal BUN and creatinine, blood glucose level is 284 with

low albumin 2.5, total protein 5.8.



PHYSICAL EXAMINATION:

GENERAL:  A 68 years old male.

VITAL SIGNS:  Afebrile with pulse of 70, respiratory rate 20 to 22, blood

pressure 180/86.

HEENT:  Showed pale, dry oral mucous membranes.  Nonicteric sclerae.

LUNGS:  Few scattered crepitation with decreased air entry at bases.

HEART:  Positive S1 and S2.

ABDOMEN:  Soft, bowel sounds are present with slight distention.  No mass

or organomegaly.  No rebound tenderness or guarding.

EXTREMITIES:  With slight lower extremity edematous changes.  No clubbing

or cyanosis.

NEUROLOGIC:  No reported new neurological deficits, sensory, or motor. 

Peripheral pulses are present bilaterally, but weak.  However, it has to be

mentioned that the patient is somewhat mildly confused.



IMPRESSION:

1.  Alcoholism with alcoholic liver disease.

2.  Change in mental status, most likely secondary to above.

3.  Recent history of gastrointestinal bleeding, today.

4.  Anemia secondary to above.

5.  Chronic renal insufficiency, improving.

6.  Poorly controlled hypertension.

7.  Poorly controlled diabetes mellitus.

8.  Reexacerbation of peptic ulcer disease.

9.  Pancytopenia, most likely alcohol induced.

10.  Bone marrow suppression.

11.  Known history of alcoholic pancreatitis, improving with evidence of

mild hepatic encephalopathy.



SUGGESTION:

1.  Continue current management.

2.  Neurology evaluation.

3.  Repeat CAT scan or MRI of the head.

4.  Further recommendation to follow up.





__________________________________________

Neri Griffin MD



DD:  02/21/2018 12:21:15

DT:  02/21/2018 13:18:07

Job # [id number]

## 2018-02-21 NOTE — CP.PCM.PN
Subjective





- Date & Time of Evaluation


Date of Evaluation: 02/21/18


Time of Evaluation: 07:05





- Subjective


Subjective: 





PGY1 Medicine Note for Dr. Fung





Patient seen and examined at bedside this morning. Patient is resting 

comfortably in bed. Where he used to respond in english to questions, he is now 

only speaking in his native language. He appears more confused today compared 

to previous days. ROS unattainable due to patient's mental status. Nursing 

staff state he slept throughout the night without any problems. 





Objective





- Vital Signs/Intake and Output


Vital Signs (last 24 hours): 


 











Temp Pulse Resp BP Pulse Ox


 


 97.2 F L  67   20   196/94 H  99 


 


 02/21/18 08:01  02/21/18 08:01  02/21/18 08:01  02/21/18 08:01  02/21/18 08:01











- Medications


Medications: 


 Current Medications





Amlodipine Besylate (Norvasc)  5 mg PO Q24H On license of [facility]


   Last Admin: 02/20/18 22:42 Dose:  5 mg


Artificial Tears (Artificial Tears)  0 ml OD DAILY [facility]


   Last Admin: 02/20/18 13:23 Dose:  2 drop


Folic Acid (Folic Acid)  1 mg PO DAILY [facility]


   Last Admin: 02/21/18 09:42 Dose:  1 mg


Pantoprazole Sodium 80 mg/ (Sodium Chloride)  100 mls @ 10 mls/hr IVPB .Q10H [facility]


   PRN Reason: 8 MG/HR


   Last Admin: 02/21/18 03:09 Dose:  10 mls/hr


Insulin Human Regular (Novolin R)  0 unit SC ACHS [facility]


   PRN Reason: Protocol


   Last Admin: 02/21/18 08:27 Dose:  4 unit


Lisinopril (Zestril)  20 mg PO DAILY [facility]


   Last Admin: 02/21/18 09:42 Dose:  20 mg


Multivitamins (Hexavitamin)  1 tab PO DAILY [facility]


   Last Admin: 02/21/18 09:41 Dose:  1 tab


Neomycin Sulfate (Neomycin Tab)  500 mg PO Q6H [facility]


   Last Admin: 02/21/18 08:27 Dose:  500 mg


Ondansetron HCl (Zofran Inj)  4 mg IVP Q6 [facility]


   Last Admin: 02/14/18 14:43 Dose:  Not Given


Potassium Chloride (K-Dur 20 Meq Er Tab)  40 meq PO DAILY [facility]


   Last Admin: 02/21/18 09:42 Dose:  40 meq


Propranolol HCl (Inderal)  20 mg PO TID On license of [facility]


   Last Admin: 02/21/18 09:42 Dose:  20 mg


Sucralfate (Carafate Oral Susp)  1 gm PO ACHS On license of [facility]


   Last Admin: 02/21/18 06:44 Dose:  1 gm


Thiamine HCl (Vitamin B1 Tab)  100 mg PO DAILY On license of [facility]


   Last Admin: 02/21/18 09:42 Dose:  100 mg











- Labs


Labs: 


 





 02/21/18 08:23 





 02/21/18 08:23 





 











PT  15.2 SECONDS (9.7-12.2)  H  02/20/18  08:00    


 


INR  1.3   02/20/18  08:00    


 


APTT  38 SECONDS (21-34)  H  02/20/18  08:00    














- Constitutional


Appears: Non-toxic, No Acute Distress





- Head Exam


Head Exam: ATRAUMATIC, NORMOCEPHALIC





- Eye Exam


Eye Exam: EOMI


Pupil Exam: PERRL





- ENT Exam


ENT Exam: Mucous Membranes Moist





- Respiratory Exam


Respiratory Exam: Clear to Ausculation Bilateral, NORMAL BREATHING PATTERN.  

absent: Accessory Muscle Use, Rales, Rhonchi, Wheezes, Respiratory Distress





- Cardiovascular Exam


Cardiovascular Exam: REGULAR RHYTHM, +S1, +S2





- GI/Abdominal Exam


GI & Abdominal Exam: Soft, Normal Bowel Sounds.  absent: Distended, Firm, 

Guarding, Rigid, Tenderness





- Extremities Exam


Extremities Exam: absent: Calf Tenderness, Pedal Edema





- Neurological Exam


Neurological Exam: Alert, Altered, Awake





- Psychiatric Exam


Psychiatric exam: absent: Normal Affect, Normal Mood





- Skin


Skin Exam: Dry, Warm





Assessment and Plan





- Assessment and Plan (Free Text)


Plan: 





GI bleed; resolved and ruled out 


- GI consulted, Dr. Quintanilla; recs appreciated


- Endoscopy (1/26/18) showed candidiasis esophagitis, gastric ulcer (biopsied); 

erythematous mucosa in prepyloric region of stomach and erythematous 

duodenopathy. 


- Colonscopy (1/30/18) showed colitis, diverticulitis, internal hemorrhoids, 

and chronic inflammation. 


- Continue Protonix drip


- Continue NS@120mls/hr


- Neomycin Sulfate 500mg PO q6h


- Per GI note, patient may be candidate for possible PEG.





Acute Renal Failure; chronic CKD 


- Upon Admission Cr. 4.6


- Cr 1.2


- Lisinopril and Lasix held initially as it could have be contributing to Acute 

Kidney Injury


* Lisinopril @20mg daily in AM (home dose 40mg)


* Amlodipine 5mg PO HS


Alcoholic Liver Cirrhosis


- Ammonia 29 (2/16) - f/u today 


- Lactulose 20gm PO q12h





Hypertension;chronic 


- Lisinopril @20mg daily in AM (home dose 40mg)


- Amlodipine 5mg PO HS





Diabetes


- ISS


- Accuchecks ACHS





PPX


- Folic acid 1 mg PO daily


- Multivitamin 1 tab PO daily


- Thiamine 100mg PO daily


- Potassium 40meq PO daily (takes at home)





DISPO: Patient came to ER from [facility], discharge patient back to [facility] however 

patient family does not like last rehab center and refuses to let him go back. 

Working with social work to get placed into different facility. Patient was 

told to stop drinking; patient is unable to verbalize understanding however 

encouragement given to the family. The patient is stable for d/c as soon as [facility] 

is set up for patient.





Case discussed with Dr. Antonieta Gamble Alex PGY1

## 2018-02-22 LAB
ALBUMIN SERPL-MCNC: 2.7 G/DL (ref 3.5–5)
ALBUMIN/GLOB SERPL: 0.9 {RATIO} (ref 1–2.1)
ALT SERPL-CCNC: 30 U/L (ref 21–72)
AST SERPL-CCNC: 43 U/L (ref 17–59)
BASOPHILS # BLD AUTO: 0.1 K/UL (ref 0–0.2)
BASOPHILS NFR BLD: 1.4 % (ref 0–2)
BUN SERPL-MCNC: 6 MG/DL (ref 9–20)
CALCIUM SERPL-MCNC: 9.6 MG/DL (ref 8.6–10.4)
EOSINOPHIL # BLD AUTO: 0.3 K/UL (ref 0–0.7)
EOSINOPHIL NFR BLD: 6.3 % (ref 0–4)
ERYTHROCYTE [DISTWIDTH] IN BLOOD BY AUTOMATED COUNT: 21.3 % (ref 11.5–14.5)
GFR NON-AFRICAN AMERICAN: 55
HGB BLD-MCNC: 10.1 G/DL (ref 12–18)
LYMPHOCYTES # BLD AUTO: 1.4 K/UL (ref 1–4.3)
LYMPHOCYTES NFR BLD AUTO: 29.4 % (ref 20–40)
MCH RBC QN AUTO: 28.1 PG (ref 27–31)
MCHC RBC AUTO-ENTMCNC: 32.3 G/DL (ref 33–37)
MCV RBC AUTO: 87.1 FL (ref 80–94)
MONOCYTES # BLD: 0.3 K/UL (ref 0–0.8)
MONOCYTES NFR BLD: 5.6 % (ref 0–10)
NEUTROPHILS # BLD: 2.8 K/UL (ref 1.8–7)
NEUTROPHILS NFR BLD AUTO: 57.3 % (ref 50–75)
NRBC BLD AUTO-RTO: 0 % (ref 0–2)
PLATELET # BLD: 92 K/UL (ref 130–400)
PMV BLD AUTO: 8.6 FL (ref 7.2–11.7)
RBC # BLD AUTO: 3.58 MIL/UL (ref 4.4–5.9)
WBC # BLD AUTO: 4.8 K/UL (ref 4.8–10.8)

## 2018-02-22 RX ADMIN — POTASSIUM CHLORIDE SCH MEQ: 20 TABLET, EXTENDED RELEASE ORAL at 10:01

## 2018-02-22 RX ADMIN — SUCRALFATE SCH GM: 1 SUSPENSION ORAL at 06:35

## 2018-02-22 RX ADMIN — SUCRALFATE SCH GM: 1 SUSPENSION ORAL at 17:28

## 2018-02-22 RX ADMIN — HUMAN INSULIN SCH UNIT: 100 INJECTION, SOLUTION SUBCUTANEOUS at 17:28

## 2018-02-22 RX ADMIN — POLYVINYL ALCOHOL SCH: 14 SOLUTION/ DROPS OPHTHALMIC at 10:01

## 2018-02-22 RX ADMIN — SUCRALFATE SCH GM: 1 SUSPENSION ORAL at 21:03

## 2018-02-22 RX ADMIN — SUCRALFATE SCH GM: 1 SUSPENSION ORAL at 13:05

## 2018-02-22 RX ADMIN — HUMAN INSULIN SCH UNIT: 100 INJECTION, SOLUTION SUBCUTANEOUS at 09:05

## 2018-02-22 RX ADMIN — HUMAN INSULIN SCH: 100 INJECTION, SOLUTION SUBCUTANEOUS at 22:04

## 2018-02-22 RX ADMIN — HUMAN INSULIN SCH UNIT: 100 INJECTION, SOLUTION SUBCUTANEOUS at 13:05

## 2018-02-22 RX ADMIN — Medication SCH TAB: at 10:01

## 2018-02-22 NOTE — PN
DATE:



LOCATION:  [address] [place] [address] B.



SUBJECTIVE:  This is a 68-year-old male seen and examined early in rounds

without significant clinical changes or reported active bleeding with

period of mild semi-confusion, somewhat tolerating some oral intake, but

not adequate.



No reported chest pain, palpitations, significant shortness of breath, or

active bleeding.



The entire chart is reviewed including, but not limited to the most recent

lab and radiologic study results, current and previous medication list,

current and previous medical events.



Today's, lab showed white blood cells of 4.8, low hemoglobin 10.1,

hematocrit 31.2 with thrombocytopenia of 92.  Blood glucose level 233,

albumin 2.7, and total protein 5.8.



PHYSICAL EXAMINATION:

GENERAL:  A 68-year-old male, semi-confused, but more awake.

VITAL SIGNS:  Afebrile with pulse of 66, respiratory rate 22, blood

pressure 180/88.

HEENT:  Showed pale, dry oral mucous membranes.  Nonicteric sclerae.

LUNGS:  Few crepitations.  Decreased air entry at bases.

HEART:  Positive S1 and S2.

ABDOMEN:  Soft with mild distention.  Mildly obese.  Bowel sounds are

present.  No mass or organomegaly.

RECTAL:  The patient refused.

EXTREMITIES:  Slight lower extremity edematous changes.  No clubbing or

cyanosis.

NEUROLOGIC:  No reported neurological deficits, sensory or motor.  No focal

deficit.  Peripheral pulses are present bilaterally, but weak.



IMPRESSION:

1.  Alcoholism with alcoholic liver disease.

2.  Change in mental status with mild hepatic encephalopathy secondary to

above.

3.  Anemia.

4.  Peptic ulcer disease.

5.  Recent history of gastrointestinal blood loss.

6.  Chronic renal insufficiency, improving.

7.  Poorly controlled hypertension, poorly controlled diabetes mellitus.

8.  Pancytopenia secondary to above.

9.  Bone marrow suppression due to alcoholism.

10.  Known history of alcoholic pancreatitis.



SUGGESTIONS:

1.  Continue current management.

2.  Peripheral hyperalimentation.

3.  Calorie counting.

4.  Further recommendation to follow.





__________________________________________

Neri Griffin MD





DD:  02/22/2018 17:06:38

DT:  02/22/2018 19:27:17

Job # [id number]

## 2018-02-22 NOTE — CP.PCM.PN
Subjective





- Date & Time of Evaluation


Date of Evaluation: 02/22/18


Time of Evaluation: 07:35





- Subjective


Subjective: 





PGY1 Medicine Note for Dr. Fung





Patient seen and examined at bedside this morning. Patient is awake and alert 

sitting up eating breakfast on his own. He is mildly confused. He denies any 

complaints at this time. 





Objective





- Vital Signs/Intake and Output


Vital Signs (last 24 hours): 


 











Temp Pulse Resp BP Pulse Ox


 


 97.7 F   64   20   185/97 H  100 


 


 02/22/18 15:00  02/22/18 15:00  02/22/18 15:00  02/22/18 15:00  02/22/18 15:00








Intake and Output: 


 











 02/22/18 02/22/18





 06:59 18:59


 


Output Total 1500 600


 


Balance -1500 -600














- Medications


Medications: 


 Current Medications





Amlodipine Besylate (Norvasc)  5 mg PO Q24H [facility]


   Last Admin: 02/21/18 19:40 Dose:  5 mg


Artificial Tears (Artificial Tears)  0 ml OD DAILY [facility]


   Last Admin: 02/22/18 10:01 Dose:  Not Given


Folic Acid (Folic Acid)  1 mg PO DAILY [facility]


   Last Admin: 02/22/18 10:01 Dose:  1 mg


Pantoprazole Sodium 80 mg/ (Sodium Chloride)  100 mls @ 10 mls/hr IVPB .Q10H [facility]


   PRN Reason: 8 MG/HR


   Last Admin: 02/21/18 22:47 Dose:  10 mls/hr


Insulin Human Regular (Novolin R)  0 unit SC ACHS [facility]


   PRN Reason: Protocol


   Last Admin: 02/22/18 17:28 Dose:  4 unit


Lactulose (Enulose)  20 gm PO Q12H [facility]


   Last Admin: 02/22/18 13:05 Dose:  20 gm


Lisinopril (Zestril)  20 mg PO DAILY [facility]


   Last Admin: 02/22/18 10:02 Dose:  20 mg


Multivitamins (Hexavitamin)  1 tab PO DAILY [facility]


   Last Admin: 02/22/18 10:01 Dose:  1 tab


Ondansetron HCl (Zofran Inj)  4 mg IVP Q6 [facility]


   Last Admin: 02/14/18 14:43 Dose:  Not Given


Potassium Chloride (K-Dur 20 Meq Er Tab)  40 meq PO DAILY [facility]


   Last Admin: 02/22/18 10:01 Dose:  40 meq


Propranolol HCl (Inderal)  20 mg PO TID [facility]


   Last Admin: 02/22/18 17:28 Dose:  20 mg


Sucralfate (Carafate Oral Susp)  1 gm PO ACHS [facility]


   Last Admin: 02/22/18 17:28 Dose:  1 gm


Thiamine HCl (Vitamin B1 Tab)  100 mg PO DAILY [facility]


   Last Admin: 02/22/18 10:02 Dose:  100 mg











- Labs


Labs: 


 





 02/22/18 08:31 





 02/22/18 08:31 





 











PT  15.2 SECONDS (9.7-12.2)  H  02/20/18  08:00    


 


INR  1.3   02/20/18  08:00    


 


APTT  38 SECONDS (21-34)  H  02/20/18  08:00    














- Constitutional


Appears: Non-toxic, No Acute Distress





- Head Exam


Head Exam: ATRAUMATIC, NORMOCEPHALIC





- Eye Exam


Eye Exam: EOMI, Normal appearance


Pupil Exam: PERRL





- ENT Exam


ENT Exam: Mucous Membranes Moist





- Respiratory Exam


Respiratory Exam: Clear to Ausculation Bilateral, NORMAL BREATHING PATTERN.  

absent: Accessory Muscle Use, Rales, Rhonchi, Wheezes, Respiratory Distress





- Cardiovascular Exam


Cardiovascular Exam: REGULAR RHYTHM, +S1, +S2





- GI/Abdominal Exam


GI & Abdominal Exam: Soft, Normal Bowel Sounds.  absent: Distended, Firm, 

Guarding, Rigid, Tenderness





- Extremities Exam


Extremities Exam: absent: Calf Tenderness, Pedal Edema





- Neurological Exam


Neurological Exam: Alert, Altered (in and out of confused state throughout the 

day.), Awake.  absent: Oriented x3





- Psychiatric Exam


Psychiatric exam: Normal Affect, Normal Mood





- Skin


Skin Exam: Dry, Warm





Assessment and Plan





- Assessment and Plan (Free Text)


Plan: 





GI bleed; resolved and ruled out 


- GI consulted, Dr. Quintanilla; recs appreciated


- Endoscopy (1/26/18) showed candidiasis esophagitis, gastric ulcer (biopsied); 

erythematous mucosa in prepyloric region of stomach and erythematous 

duodenopathy. 


- Colonscopy (1/30/18) showed colitis, diverticulitis, internal hemorrhoids, 

and chronic inflammation. 


- Continue Protonix drip


- Continue NS@120mls/hr


- Neomycin Sulfate 500mg PO q6h


- Per GI note, patient may be candidate for possible PEG.


* Patient is currently eating





Acute Renal Failure; chronic CKD 


- Upon Admission Cr. 4.6


- Cr 1.3


- Lisinopril and Lasix held initially as it could have be contributing to Acute 

Kidney Injury


* Lisinopril @20mg daily in AM (home dose 40mg)


* Amlodipine 5mg PO HS





Alcoholic Liver Cirrhosis


- Ammonia 29 (2/16) - f/u today 


- Lactulose 20gm PO q12h





Hypertension;chronic 


- Lisinopril @20mg daily in AM (home dose 40mg)


- Amlodipine 5mg PO HS





Diabetes


- ISS


- Accuchecks ACHS





PPX


- Folic acid 1 mg PO daily


- Multivitamin 1 tab PO daily


- Thiamine 100mg PO daily


- Potassium 40meq PO daily (takes at home)





DISPO: Patient came to ER from [facility], discharge patient back to [facility] however 

patient family does not like last rehab center and refuses to let him go back. 

Working with social work to get placed into different facility. The patient is 

stable for d/c as soon as [facility] is set up for patient. No update at this time.





Case discussed with Dr. Antonieta Gamble Alex PGY1

## 2018-02-23 LAB
ALBUMIN SERPL-MCNC: 2.9 G/DL (ref 3.5–5)
ALBUMIN/GLOB SERPL: 0.8 {RATIO} (ref 1–2.1)
ALT SERPL-CCNC: 26 U/L (ref 21–72)
AST SERPL-CCNC: 40 U/L (ref 17–59)
BASOPHILS # BLD AUTO: 0.1 K/UL (ref 0–0.2)
BASOPHILS NFR BLD: 1.3 % (ref 0–2)
BUN SERPL-MCNC: 7 MG/DL (ref 9–20)
CALCIUM SERPL-MCNC: 9.4 MG/DL (ref 8.6–10.4)
EOSINOPHIL # BLD AUTO: 0.3 K/UL (ref 0–0.7)
EOSINOPHIL NFR BLD: 5.3 % (ref 0–4)
ERYTHROCYTE [DISTWIDTH] IN BLOOD BY AUTOMATED COUNT: 21.4 % (ref 11.5–14.5)
GFR NON-AFRICAN AMERICAN: > 60
HGB BLD-MCNC: 10.6 G/DL (ref 12–18)
LYMPHOCYTES # BLD AUTO: 1.2 K/UL (ref 1–4.3)
LYMPHOCYTES NFR BLD AUTO: 19.6 % (ref 20–40)
MCH RBC QN AUTO: 28.3 PG (ref 27–31)
MCHC RBC AUTO-ENTMCNC: 32.8 G/DL (ref 33–37)
MCV RBC AUTO: 86.5 FL (ref 80–94)
MONOCYTES # BLD: 0.3 K/UL (ref 0–0.8)
MONOCYTES NFR BLD: 4.8 % (ref 0–10)
NEUTROPHILS # BLD: 4.3 K/UL (ref 1.8–7)
NEUTROPHILS NFR BLD AUTO: 69 % (ref 50–75)
NRBC BLD AUTO-RTO: 0.1 % (ref 0–2)
PLATELET # BLD: 74 K/UL (ref 130–400)
PMV BLD AUTO: 8.6 FL (ref 7.2–11.7)
RBC # BLD AUTO: 3.75 MIL/UL (ref 4.4–5.9)
WBC # BLD AUTO: 6.2 K/UL (ref 4.8–10.8)

## 2018-02-23 RX ADMIN — SUCRALFATE SCH GM: 1 SUSPENSION ORAL at 12:11

## 2018-02-23 RX ADMIN — HUMAN INSULIN SCH UNIT: 100 INJECTION, SOLUTION SUBCUTANEOUS at 08:54

## 2018-02-23 RX ADMIN — HUMAN INSULIN SCH: 100 INJECTION, SOLUTION SUBCUTANEOUS at 22:13

## 2018-02-23 RX ADMIN — POLYVINYL ALCOHOL SCH: 14 SOLUTION/ DROPS OPHTHALMIC at 09:45

## 2018-02-23 RX ADMIN — SUCRALFATE SCH GM: 1 SUSPENSION ORAL at 21:26

## 2018-02-23 RX ADMIN — SUCRALFATE SCH GM: 1 SUSPENSION ORAL at 06:30

## 2018-02-23 RX ADMIN — HUMAN INSULIN SCH UNIT: 100 INJECTION, SOLUTION SUBCUTANEOUS at 17:35

## 2018-02-23 RX ADMIN — SUCRALFATE SCH GM: 1 SUSPENSION ORAL at 17:34

## 2018-02-23 RX ADMIN — HUMAN INSULIN SCH UNIT: 100 INJECTION, SOLUTION SUBCUTANEOUS at 13:29

## 2018-02-23 RX ADMIN — POTASSIUM CHLORIDE SCH MEQ: 20 TABLET, EXTENDED RELEASE ORAL at 09:45

## 2018-02-23 RX ADMIN — Medication SCH TAB: at 09:45

## 2018-02-23 NOTE — CP.PCM.PN
Subjective





- Date & Time of Evaluation


Date of Evaluation: 02/23/18


Time of Evaluation: 07:35





- Subjective


Subjective: 





Patient seen and examined at bedside this morning. Overnight, the patients BP 

came up to 203/98 but he was asymptomatic. The patient was given 10mg of 

Hydralazine IVP once. Currently, the patient is comfortable and speaking 

comprehensively in his natural language, [place]jar[place]. He states that he would like 

to ambulate, PT was ordered and will help him move around. Patient is eating 

well and tolerating fluids. Patient no longer complains of abdominal aches. He 

states that he is ready to leave; he was told [facility] plans are being developed by 

[facility].  He denies fevers, nausea, vomiting, hematochezia, Diarrhea, constipation, 

chest pain, or SOB. 








Objective





- Vital Signs/Intake and Output


Vital Signs (last 24 hours): 


 











Temp Pulse Resp BP Pulse Ox


 


 97.4 F L  64   20   182/93 H  96 


 


 02/23/18 08:19  02/23/18 08:19  02/23/18 08:19  02/23/18 08:19  02/23/18 08:19








Intake and Output: 


 











 02/23/18 02/23/18





 06:59 18:59


 


Intake Total 800 


 


Output Total 1200 


 


Balance -400 














- Medications


Medications: 


 Current Medications





Amlodipine Besylate (Norvasc)  5 mg PO Q24H [facility]


   Last Admin: 02/22/18 21:03 Dose:  5 mg


Artificial Tears (Artificial Tears)  0 ml OD DAILY [facility]


   Last Admin: 02/22/18 10:01 Dose:  Not Given


Folic Acid (Folic Acid)  1 mg PO DAILY [facility]


   Last Admin: 02/23/18 09:45 Dose:  1 mg


Pantoprazole Sodium 80 mg/ (Sodium Chloride)  100 mls @ 10 mls/hr IVPB .Q10H [facility]


   PRN Reason: 8 MG/HR


   Last Admin: 02/23/18 01:43 Dose:  10 mls/hr


Insulin Human Regular (Novolin R)  0 unit SC ACHS [facility]


   PRN Reason: Protocol


   Last Admin: 02/23/18 08:54 Dose:  4 unit


Lactulose (Enulose)  20 gm PO Q12H [facility]


   Last Admin: 02/23/18 00:19 Dose:  20 gm


Lisinopril (Zestril)  20 mg PO DAILY [facility]


   Last Admin: 02/23/18 09:45 Dose:  20 mg


Multivitamins (Hexavitamin)  1 tab PO DAILY [facility]


   Last Admin: 02/23/18 09:45 Dose:  1 tab


Ondansetron HCl (Zofran Inj)  4 mg IVP Q6 [facility]


   Last Admin: 02/14/18 14:43 Dose:  Not Given


Potassium Chloride (K-Dur 20 Meq Er Tab)  40 meq PO DAILY [facility]


   Last Admin: 02/23/18 09:45 Dose:  40 meq


Propranolol HCl (Inderal)  20 mg PO TID [facility]


   Last Admin: 02/23/18 09:47 Dose:  20 mg


Sucralfate (Carafate Oral Susp)  1 gm PO ACHS [facility]


   Last Admin: 02/23/18 06:30 Dose:  1 gm


Thiamine HCl (Vitamin B1 Tab)  100 mg PO DAILY [facility]


   Last Admin: 02/23/18 09:45 Dose:  100 mg











- Labs


Labs: 


 





 02/23/18 08:11 





 02/23/18 08:11 





 











PT  15.2 SECONDS (9.7-12.2)  H  02/20/18  08:00    


 


INR  1.3   02/20/18  08:00    


 


APTT  38 SECONDS (21-34)  H  02/20/18  08:00    














- Constitutional


Appears: Non-toxic, No Acute Distress





- Head Exam


Head Exam: ATRAUMATIC, NORMOCEPHALIC





- Eye Exam


Eye Exam: EOMI, Normal appearance


Pupil Exam: NORMAL ACCOMODATION





- ENT Exam


ENT Exam: Mucous Membranes Moist





- Respiratory Exam


Respiratory Exam: Clear to Ausculation Bilateral, NORMAL BREATHING PATTERN.  

absent: Accessory Muscle Use, Rales, Rhonchi, Wheezes, Respiratory Distress





- Cardiovascular Exam


Cardiovascular Exam: REGULAR RHYTHM, +S1, +S2





- GI/Abdominal Exam


GI & Abdominal Exam: Soft, Normal Bowel Sounds.  absent: Distended, Firm, 

Guarding, Rigid, Tenderness





- Extremities Exam


Extremities Exam: absent: Calf Tenderness, Pedal Edema





- Neurological Exam


Neurological Exam: Alert, Awake, Oriented x3





- Psychiatric Exam


Psychiatric exam: Normal Affect, Normal Mood





- Skin


Skin Exam: Dry, Warm





Assessment and Plan





- Assessment and Plan (Free Text)


Plan: 





GI bleed; resolved and ruled out 


- GI consulted, Dr. Quintanilla; recs appreciated


- Endoscopy (1/26/18) showed candidiasis esophagitis, gastric ulcer (biopsied); 

erythematous mucosa in prepyloric region of stomach and erythematous 

duodenopathy. 


- Colonscopy (1/30/18) showed colitis, diverticulitis, internal hemorrhoids, 

and chronic inflammation. 


- Continue Protonix drip


- Continue NS@120mls/hr


- Neomycin Sulfate 500mg PO q6h


- Per GI note, patient may be candidate for possible PEG.


* Patient is currently eating





Acute Renal Failure; chronic CKD 


- Upon Admission Cr. 4.6


- Cr 1.2


- Lisinopril and Lasix held initially as it could have be contributing to Acute 

Kidney Injury


* Lisinopril @20mg daily in AM - increased to home dose of 40mg


* Amlodipine 5mg PO HS 





Alcoholic Liver Cirrhosis


- Ammonia 29 (2/16) - f/u tomorrow morning


- Lactulose 20gm PO q12h





Hypertension;chronic 


- Uncontrolled, BP of 203/98 over night, received 10mg of Hydralazine IVP. 


- Lisinopril @20mg daily in AM - increased to home dose of 40mg


- Amlodipine 5mg PO HS


- Hydralazine 10mg IVP q6h prn for SBP >160





Diabetes


- ISS - increased from med to high dose


- Accuchecks ACHS





PPX


- Folic acid 1 mg PO daily


- Multivitamin 1 tab PO daily


- Thiamine 100mg PO daily


- Potassium 40meq PO daily (takes at home) - on hold, K+5.3 today - given 15ml 

of Kayexalate


- PT





DISPO: Patient came to ER from [facility], discharge patient back to [facility] however 

patient family does not like last rehab center and refuses to let him go back. 

Working with social work to get placed into different facility. The patient is 

stable for d/c as soon as [facility] is set up for patient. No update at this time.





Will discuss case with Dr. Antonieta Gamble Alex PGY1

## 2018-02-23 NOTE — PN
DATE:



LOCATION:  [facility], bed B.



SUBJECTIVE:  This is a 67-year-old male, seen and examined in rounds

without reported significant clinical changes, but with periods of

confusion with reported increase of blood pressure on and off.  The

patient's oral intake is still poor, this is adequate. but no reported

active bleeding or tremors, no reported chest pain or palpitations.



The entire chart is reviewed including, but not limited to the most recent

lab and radiologic study results, current and the previous medication list,

current and the previous medical events.  Case discussed with the staff at

length.  No reported diarrhea, nausea, vomiting, chest pain, or significant

shortness of breath today.



Today's lab showed hemoglobin 10.6, hematocrit 32.4, thrombocytopenia of

74, potassium 5.3, CO2 content 21 indicative of metabolic acidosis, blood

glucose level 303, total protein 6.2, albumin 2.9.



PHYSICAL EXAMINATION

GENERAL:  A 67-year-old male.

VITAL SIGNS:  Afebrile with pulse of 66, respiratory rate 20 to 22, and

blood pressure 160/94.

HEENT:  Showed pale, dry oral mucous membranes.  Nonicteric sclerae.

LUNGS:  Few scattered crepitations.  Decreased air entry at bases.

HEART:  Positive S1 and S2.

ABDOMEN:  Soft.  Bowel sounds are present with mild generalized tenderness,

mildly obese.  No mass or organomegaly.  No rebound tenderness or guarding.

RECTAL:  The patient refused.

EXTREMITIES:  Lower extremity mild edematous changes.  No clubbing or

cyanosis.

NEUROLOGIC:  No reported new neurological deficits, sensory or motor. 

Peripheral pulses are present bilaterally.



IMPRESSION:

1.  Alcoholism.

2.  Alcoholic liver disease by history.

3.  Anemia, most likely secondary to above.

4.  Change in mental status with slight hepatic encephalopathy, slightly

improving.

5.  Peptic ulcer disease by history.

6.  Known history of GI blood loss.

7.  Known history of alcoholic pancreatitis.

8.  Poorly controlled hypertension.

9.  Poorly controlled diabetes mellitus.



SUGGESTIONS:

1.  Continue current management.

2.  Follow Cardiology evaluation.

3.  Repeat stool for occult blood.

4.  Repeat ammonia level before discharge home.

5.  Further recommendations to follow.





__________________________________________

Neri Griffin MD



DD:  02/23/2018 18:25:29

DT:  02/23/2018 21:00:32

Job # [id number]

## 2018-02-24 VITALS — RESPIRATION RATE: 20 BRPM

## 2018-02-24 LAB
ALBUMIN SERPL-MCNC: 2.7 G/DL (ref 3.5–5)
ALBUMIN/GLOB SERPL: 0.9 {RATIO} (ref 1–2.1)
ALT SERPL-CCNC: 29 U/L (ref 21–72)
AST SERPL-CCNC: 38 U/L (ref 17–59)
BASOPHILS # BLD AUTO: 0.1 K/UL (ref 0–0.2)
BASOPHILS NFR BLD: 0.9 % (ref 0–2)
BUN SERPL-MCNC: 10 MG/DL (ref 9–20)
CALCIUM SERPL-MCNC: 9.5 MG/DL (ref 8.6–10.4)
EOSINOPHIL # BLD AUTO: 0.2 K/UL (ref 0–0.7)
EOSINOPHIL NFR BLD: 3.6 % (ref 0–4)
ERYTHROCYTE [DISTWIDTH] IN BLOOD BY AUTOMATED COUNT: 20.6 % (ref 11.5–14.5)
GFR NON-AFRICAN AMERICAN: 55
HGB BLD-MCNC: 10.3 G/DL (ref 12–18)
LYMPHOCYTES # BLD AUTO: 1.3 K/UL (ref 1–4.3)
LYMPHOCYTES NFR BLD AUTO: 19.8 % (ref 20–40)
MCH RBC QN AUTO: 28.2 PG (ref 27–31)
MCHC RBC AUTO-ENTMCNC: 32.5 G/DL (ref 33–37)
MCV RBC AUTO: 86.7 FL (ref 80–94)
MONOCYTES # BLD: 0.3 K/UL (ref 0–0.8)
MONOCYTES NFR BLD: 3.8 % (ref 0–10)
NEUTROPHILS # BLD: 4.7 K/UL (ref 1.8–7)
NEUTROPHILS NFR BLD AUTO: 71.9 % (ref 50–75)
NRBC BLD AUTO-RTO: 0 % (ref 0–2)
PLATELET # BLD: 73 K/UL (ref 130–400)
PMV BLD AUTO: 9.4 FL (ref 7.2–11.7)
RBC # BLD AUTO: 3.66 MIL/UL (ref 4.4–5.9)
WBC # BLD AUTO: 6.6 K/UL (ref 4.8–10.8)

## 2018-02-24 RX ADMIN — SUCRALFATE SCH GM: 1 SUSPENSION ORAL at 07:59

## 2018-02-24 RX ADMIN — SUCRALFATE SCH GM: 1 SUSPENSION ORAL at 17:22

## 2018-02-24 RX ADMIN — HUMAN INSULIN SCH UNIT: 100 INJECTION, SOLUTION SUBCUTANEOUS at 12:30

## 2018-02-24 RX ADMIN — HUMAN INSULIN SCH UNIT: 100 INJECTION, SOLUTION SUBCUTANEOUS at 17:23

## 2018-02-24 RX ADMIN — HUMAN INSULIN SCH UNIT: 100 INJECTION, SOLUTION SUBCUTANEOUS at 08:30

## 2018-02-24 RX ADMIN — POLYVINYL ALCOHOL SCH: 14 SOLUTION/ DROPS OPHTHALMIC at 09:21

## 2018-02-24 RX ADMIN — Medication SCH TAB: at 09:21

## 2018-02-24 RX ADMIN — SUCRALFATE SCH GM: 1 SUSPENSION ORAL at 12:30

## 2018-02-24 RX ADMIN — SUCRALFATE SCH GM: 1 SUSPENSION ORAL at 21:27

## 2018-02-24 RX ADMIN — HUMAN INSULIN SCH: 100 INJECTION, SOLUTION SUBCUTANEOUS at 22:05

## 2018-02-24 NOTE — CP.PCM.PN
Subjective





- Date & Time of Evaluation


Date of Evaluation: 02/24/18


Time of Evaluation: 08:00





- Subjective


Subjective: 





PGY1 Medicine Note for Dr. Fung





Patient seen and examined at bedside this morning. No acute events overnight. 

Patient is resting comfortably. He is very lethargic and confused this morning. 

ROS unattainable. 





Objective





- Vital Signs/Intake and Output


Vital Signs (last 24 hours): 


 











Temp Pulse Resp BP Pulse Ox


 


 97.5 F L  67   20   162/89 H  97 


 


 02/24/18 09:48  02/24/18 09:48  02/24/18 09:48  02/24/18 09:48  02/24/18 09:48








Intake and Output: 


 











 02/24/18 02/24/18





 06:59 18:59


 


Intake Total 480 


 


Output Total 1050 


 


Balance -570 














- Medications


Medications: 


 Current Medications





Amlodipine Besylate (Norvasc)  5 mg PO Q24H [facility]


   Last Admin: 02/23/18 19:58 Dose:  5 mg


Artificial Tears (Artificial Tears)  0 ml OD DAILY [facility]


   Last Admin: 02/24/18 09:21 Dose:  Not Given


Folic Acid (Folic Acid)  1 mg PO DAILY [facility]


   Last Admin: 02/24/18 09:21 Dose:  1 mg


Hydralazine HCl (Apresoline)  10 mg IVP Q6H [facility]


   PRN Reason: SBP >160


   Last Admin: 02/23/18 13:37 Dose:  10 mg


Pantoprazole Sodium 80 mg/ (Sodium Chloride)  100 mls @ 10 mls/hr IVPB .Q10H [facility]


   PRN Reason: 8 MG/HR


   Last Admin: 02/24/18 04:00 Dose:  10 mls/hr


Insulin Human Regular (Novolin R)  0 unit SC ACHS [facility]


   PRN Reason: Protocol


   Last Admin: 02/24/18 12:30 Dose:  6 unit


Lactulose (Enulose)  20 gm PO Q12H [facility]


   Last Admin: 02/24/18 12:32 Dose:  20 gm


Lisinopril (Zestril)  40 mg PO DAILY [facility]


   Last Admin: 02/24/18 09:25 Dose:  40 mg


Multivitamins (Hexavitamin)  1 tab PO DAILY [facility]


   Last Admin: 02/24/18 09:21 Dose:  1 tab


Ondansetron HCl (Zofran Inj)  4 mg IVP Q6 [facility]


   Last Admin: 02/24/18 13:00 Dose:  4 mg


Potassium Chloride (K-Dur 20 Meq Er Tab)  40 meq PO DAILY [facility]


   Last Admin: 02/23/18 09:45 Dose:  40 meq


Propranolol HCl (Inderal)  20 mg PO TID [facility]


   Last Admin: 02/24/18 13:46 Dose:  20 mg


Sucralfate (Carafate Oral Susp)  1 gm PO ACHS [facility]


   Last Admin: 02/24/18 12:30 Dose:  1 gm


Thiamine HCl (Vitamin B1 Tab)  100 mg PO DAILY [facility]


   Last Admin: 02/24/18 09:29 Dose:  100 mg











- Labs


Labs: 


 





 02/24/18 11:38 





 02/24/18 11:38 





 











PT  15.2 SECONDS (9.7-12.2)  H  02/20/18  08:00    


 


INR  1.3   02/20/18  08:00    


 


APTT  38 SECONDS (21-34)  H  02/20/18  08:00    














- Constitutional


Appears: Non-toxic, No Acute Distress





- Head Exam


Head Exam: ATRAUMATIC, NORMOCEPHALIC





- Eye Exam


Eye Exam: EOMI, PERRL





- ENT Exam


ENT Exam: Mucous Membranes Moist





- Respiratory Exam


Respiratory Exam: Clear to Ausculation Bilateral, NORMAL BREATHING PATTERN.  

absent: Accessory Muscle Use, Rales, Rhonchi, Wheezes, Respiratory Distress





- Cardiovascular Exam


Cardiovascular Exam: REGULAR RHYTHM, +S1, +S2





- GI/Abdominal Exam


GI & Abdominal Exam: Soft, Normal Bowel Sounds.  absent: Distended, Firm, 

Guarding, Rigid, Tenderness





- Extremities Exam


Extremities Exam: absent: Calf Tenderness, Pedal Edema





- Neurological Exam


Neurological Exam: Alert, Awake.  absent: Oriented x3





- Psychiatric Exam


Psychiatric exam: Normal Affect, Normal Mood





- Skin


Skin Exam: Dry, Warm





Assessment and Plan





- Assessment and Plan (Free Text)


Plan: 





GI bleed; resolved and ruled out 


- GI consulted, Dr. Quintanilla; recs appreciated


- Endoscopy (1/26/18) showed candidiasis esophagitis, gastric ulcer (biopsied); 

erythematous mucosa in prepyloric region of stomach and erythematous 

duodenopathy. 


- Colonscopy (1/30/18) showed colitis, diverticulitis, internal hemorrhoids, 

and chronic inflammation. 


- Continue Protonix drip


- Continue NS@120mls/hr


- Neomycin Sulfate 500mg PO q6h


- Per GI note, patient may be candidate for possible PEG.


* Patient is currently eating





Acute Renal Failure; chronic CKD 


- Upon Admission Cr. 4.6


- Cr 1.2


- Lisinopril and Lasix held initially as it could have be contributing to Acute 

Kidney Injury


* Lisinopril @20mg daily in AM - increased to home dose of 40mg


* Amlodipine 5mg PO HS 





Alcoholic Liver Cirrhosis


- Ammonia 29 (2/16) - f/u tomorrow morning


- Lactulose 20gm PO q12h





Hypertension;chronic 


- Uncontrolled, BP of 203/98 over night, received 10mg of Hydralazine IVP. 


- Lisinopril @20mg daily in AM - increased to home dose of 40mg


- Amlodipine 5mg PO HS


- Hydralazine 10mg IVP q6h prn for SBP >160





Diabetes


- ISS - increased from med to high dose


- Accuchecks ACHS





PPX


- Folic acid 1 mg PO daily


- Multivitamin 1 tab PO daily


- Thiamine 100mg PO daily


- Potassium 40meq PO daily (takes at home) - on hold, K+5.3 today - given 15ml 

of Kayexalate


- PT





DISPO: Patient came to ER from [facility], discharge patient back to [facility] however 

patient family does not like last rehab center and refuses to let him go back. 

Working with social work to get placed into different facility. The patient is 

stable for d/c as soon as [facility] is set up for patient. No update at this time.





Case discussed with Dr. Antonieta Gamble Alex PGY1

## 2018-02-25 LAB
ALBUMIN SERPL-MCNC: 2.7 G/DL (ref 3.5–5)
ALBUMIN/GLOB SERPL: 0.8 {RATIO} (ref 1–2.1)
ALT SERPL-CCNC: 34 U/L (ref 21–72)
AST SERPL-CCNC: 37 U/L (ref 17–59)
BASOPHILS # BLD AUTO: 0 K/UL (ref 0–0.2)
BASOPHILS NFR BLD: 0.9 % (ref 0–2)
BUN SERPL-MCNC: 9 MG/DL (ref 9–20)
CALCIUM SERPL-MCNC: 9.2 MG/DL (ref 8.6–10.4)
EOSINOPHIL # BLD AUTO: 0.3 K/UL (ref 0–0.7)
EOSINOPHIL NFR BLD: 5.2 % (ref 0–4)
ERYTHROCYTE [DISTWIDTH] IN BLOOD BY AUTOMATED COUNT: 21.3 % (ref 11.5–14.5)
GFR NON-AFRICAN AMERICAN: 50
HGB BLD-MCNC: 10.1 G/DL (ref 12–18)
LYMPHOCYTES # BLD AUTO: 1.7 K/UL (ref 1–4.3)
LYMPHOCYTES NFR BLD AUTO: 29.5 % (ref 20–40)
MCH RBC QN AUTO: 27.9 PG (ref 27–31)
MCHC RBC AUTO-ENTMCNC: 32.2 G/DL (ref 33–37)
MCV RBC AUTO: 86.4 FL (ref 80–94)
MONOCYTES # BLD: 0.3 K/UL (ref 0–0.8)
MONOCYTES NFR BLD: 5.3 % (ref 0–10)
NEUTROPHILS # BLD: 3.4 K/UL (ref 1.8–7)
NEUTROPHILS NFR BLD AUTO: 59.1 % (ref 50–75)
NRBC BLD AUTO-RTO: 0.1 % (ref 0–2)
PLATELET # BLD: 74 K/UL (ref 130–400)
PMV BLD AUTO: 9.1 FL (ref 7.2–11.7)
RBC # BLD AUTO: 3.63 MIL/UL (ref 4.4–5.9)
WBC # BLD AUTO: 5.7 K/UL (ref 4.8–10.8)

## 2018-02-25 RX ADMIN — PANTOPRAZOLE SODIUM SCH MG: 40 TABLET, DELAYED RELEASE ORAL at 09:59

## 2018-02-25 RX ADMIN — SUCRALFATE SCH GM: 1 SUSPENSION ORAL at 11:19

## 2018-02-25 RX ADMIN — SUCRALFATE SCH GM: 1 SUSPENSION ORAL at 21:39

## 2018-02-25 RX ADMIN — HUMAN INSULIN SCH: 100 INJECTION, SOLUTION SUBCUTANEOUS at 22:03

## 2018-02-25 RX ADMIN — SUCRALFATE SCH GM: 1 SUSPENSION ORAL at 17:29

## 2018-02-25 RX ADMIN — HUMAN INSULIN SCH UNIT: 100 INJECTION, SOLUTION SUBCUTANEOUS at 12:18

## 2018-02-25 RX ADMIN — PANTOPRAZOLE SODIUM SCH MG: 40 TABLET, DELAYED RELEASE ORAL at 09:56

## 2018-02-25 RX ADMIN — Medication SCH TAB: at 09:53

## 2018-02-25 RX ADMIN — POLYVINYL ALCOHOL SCH DROP: 14 SOLUTION/ DROPS OPHTHALMIC at 10:16

## 2018-02-25 RX ADMIN — HUMAN INSULIN SCH UNIT: 100 INJECTION, SOLUTION SUBCUTANEOUS at 17:29

## 2018-02-25 RX ADMIN — SUCRALFATE SCH GM: 1 SUSPENSION ORAL at 06:38

## 2018-02-25 RX ADMIN — HUMAN INSULIN SCH UNIT: 100 INJECTION, SOLUTION SUBCUTANEOUS at 08:30

## 2018-02-25 NOTE — CP.PCM.PN
Subjective





- Date & Time of Evaluation


Date of Evaluation: 02/25/18


Time of Evaluation: 09:35





- Subjective


Subjective: 





PGY1 Medicine Note for Dr. Fung





Patient seen and examined this morning. Per nursing staff, patient did not 

sleep most of the night. He was very difficult to wake up in the morning but 

was awake later in the day upon re-examination. He is still experiencing 

episodes of confusion. ROS unattainable.





Objective





- Vital Signs/Intake and Output


Vital Signs (last 24 hours): 


 











Temp Pulse Resp BP Pulse Ox


 


 97.4 F L  58 L  20   148/87   100 


 


 02/25/18 15:24  02/25/18 15:24  02/25/18 15:24  02/25/18 15:24  02/25/18 15:24








Intake and Output: 


 











 02/25/18 02/26/18





 18:59 06:59


 


Intake Total 200 


 


Output Total 400 


 


Balance -200 














- Medications


Medications: 


 Current Medications





Amlodipine Besylate (Norvasc)  5 mg PO Q24H [facility]


   Last Admin: 02/25/18 20:19 Dose:  5 mg


Artificial Tears (Artificial Tears)  0 ml OD DAILY [facility]


   Last Admin: 02/25/18 10:16 Dose:  1 drop


Folic Acid (Folic Acid)  1 mg PO DAILY [facility]


   Last Admin: 02/25/18 09:53 Dose:  1 mg


Hydralazine HCl (Apresoline)  10 mg IVP Q6H [facility]


   PRN Reason: SBP >160


   Last Admin: 02/24/18 22:49 Dose:  10 mg


Insulin Human Regular (Novolin R)  0 unit SC ACHS [facility]


   PRN Reason: Protocol


   Last Admin: 02/25/18 17:29 Dose:  8 unit


Lactulose (Enulose)  20 gm PO Q12H [facility]


   Last Admin: 02/25/18 11:19 Dose:  20 gm


Lisinopril (Zestril)  40 mg PO DAILY [facility]


   Last Admin: 02/25/18 09:53 Dose:  40 mg


Multivitamins (Hexavitamin)  1 tab PO DAILY [facility]


   Last Admin: 02/25/18 09:53 Dose:  1 tab


Ondansetron HCl (Zofran Inj)  4 mg IVP Q6 [facility]


   Last Admin: 02/25/18 17:30 Dose:  4 mg


Pantoprazole Sodium (Protonix Ec Tab)  40 mg PO DAILY [facility]


   Last Admin: 02/25/18 09:59 Dose:  40 mg


Potassium Chloride (K-Dur 20 Meq Er Tab)  40 meq PO DAILY [facility]


   Last Admin: 02/23/18 09:45 Dose:  40 meq


Propranolol HCl (Inderal)  20 mg PO TID [facility]


   Last Admin: 02/25/18 17:30 Dose:  20 mg


Sucralfate (Carafate Oral Susp)  1 gm PO ACHS [facility]


   Last Admin: 02/25/18 17:29 Dose:  1 gm


Thiamine HCl (Vitamin B1 Tab)  100 mg PO DAILY [facility]


   Last Admin: 02/25/18 09:53 Dose:  100 mg











- Labs


Labs: 


 





 02/25/18 08:16 





 02/25/18 09:30 





 











PT  15.2 SECONDS (9.7-12.2)  H  02/20/18  08:00    


 


INR  1.3   02/20/18  08:00    


 


APTT  38 SECONDS (21-34)  H  02/20/18  08:00    














- Constitutional


Appears: Non-toxic, No Acute Distress





- Head Exam


Head Exam: ATRAUMATIC, NORMOCEPHALIC





- Eye Exam


Eye Exam: Normal appearance





- ENT Exam


ENT Exam: Mucous Membranes Moist





- Respiratory Exam


Respiratory Exam: Clear to Ausculation Bilateral, NORMAL BREATHING PATTERN.  

absent: Accessory Muscle Use, Rales, Rhonchi, Wheezes, Respiratory Distress





- Cardiovascular Exam


Cardiovascular Exam: REGULAR RHYTHM, +S1, +S2





- GI/Abdominal Exam


GI & Abdominal Exam: Soft, Normal Bowel Sounds.  absent: Distended, Firm, 

Guarding, Rigid





- Extremities Exam


Extremities Exam: Normal Capillary Refill, Normal Inspection.  absent: Calf 

Tenderness, Tenderness





- Neurological Exam


Neurological Exam: Alert, Awake.  absent: Oriented x3


Neuro motor strength exam: Left Upper Extremity: 5, Right Upper Extremity: 5, 

Left Lower Extremity: 5, Right Lower Extremity: 5





- Psychiatric Exam


Psychiatric exam: Normal Mood





- Skin


Skin Exam: Dry, Warm





Assessment and Plan





- Assessment and Plan (Free Text)


Plan: 





GI bleed; resolved and ruled out 


- GI consulted, Dr. Quintanilla; recs appreciated


- Endoscopy (1/26/18) showed candidiasis esophagitis, gastric ulcer (biopsied); 

erythematous mucosa in prepyloric region of stomach and erythematous 

duodenopathy. 


- Colonscopy (1/30/18) showed colitis, diverticulitis, internal hemorrhoids, 

and chronic inflammation. 


- Continue Protonix drip


- Continue NS@120mls/hr


- Neomycin Sulfate 500mg PO q6h


- Per GI note, patient may be candidate for possible PEG.


* Patient is currently eating





Acute Renal Failure; chronic CKD 


- Upon Admission Cr. 4.6


- Cr 1.4


- Lisinopril and Lasix held initially as it could have be contributing to Acute 

Kidney Injury


* Lisinopril 40mg PO daily


* Amlodipine 5mg PO HS 





Alcoholic Liver Cirrhosis


- Ammonia 29 (2/16)


- Lactulose 20gm PO q12h





Hypertension;chronic 


- improving


- Lisinopril 40mg PO daily


- Amlodipine 5mg PO HS


- Hydralazine 10mg IVP q6h prn for SBP >160





Diabetes


- ISS - increased from med to high dose


- Accuchecks ACHS





PPX


- Folic acid 1 mg PO daily


- Multivitamin 1 tab PO daily


- Thiamine 100mg PO daily


- Potassium 40meq PO daily (takes at home) - on hold, today K+ 4.8


- PT





DISPO: Patient came to ER from [facility], discharge patient back to [facility] however 

patient family does not like last rehab center and refuses to let him go back. 

Working with social work to get placed into different facility. The patient is 

stable for d/c as soon as [facility] is set up for patient. No update at this time.





Case discussed with Dr. Antonieta Gamble Alex PGY1

## 2018-02-25 NOTE — PN
DATE:



LOCATION:  Room 558, bed B.



SUBJECTIVE:  This is a 68-year-old male, seen and examined in rounds with

the staff in the floor today without significant clinical changes or

reported active bleeding with period of semi-confusion and mild

disorientation.  Somewhat tolerating oral intake well.



No reported chest pain, significant shortness of breath, or palpitation. 

No reported chills or fever.



Today's lab showed hemoglobin of 10.1, hematocrit 31.3, with normal white

blood cells, but thrombocytopenia of 74, with blood glucose level of 239. 

The patient still has low albumin and low total protein.



PHYSICAL EXAMINATION:

GENERAL:  A 68-year-old male.

VITAL SIGNS:  Afebrile with pulse of 64, respiratory rate 20 to 22, and

blood pressure 130/78.

HEENT:  Showed pale, dry oral mucous membranes.  Nonicteric sclerae.

LUNGS:  Few scattered bilateral crepitations.  Decreased air entry at

bases.

HEART:  Positive S1 and S2.

ABDOMEN:  Soft.  Bowel sounds are present with mild generalized tenderness.

No mass or organomegaly.  No rebound tenderness or guarding.

RECTAL:  The patient refused.

EXTREMITIES:  Without significant clubbing, cyanosis, or edema, but the

patient appears to be very lethargic at times with period of strong

confusion.



IMPRESSION:

1.  Alcoholism with alcoholic liver disease.

2.  Reported gastrointestinal bleeding, resolved.

3.  Known history of hypertension, poorly controlled diabetes mellitus,

electrolyte imbalance.

4.  Malnutrition with hypoalbuminemia, hypoproteinemia.

5.  Change of mental status with mild alcoholic hepatic encephalopathy.

6.  Known history of alcoholic pancreatitis, none recently.



SUGGESTION:

1.  Continue current management.

2.  Repeat stool for occult blood.

3.  Rehydration with peripheral hyperalimentation.

4.  Blood transfusion only as needed.  Keep hemoglobin around 10 gm%.

5.  No need for any further aggressive GI workup in the meantime.  The

patient had upper and lower endoscopy apparently recently.

6.  Case discussed more than once with family.  Further recommendation to

follow.

7.  The patient may need MRI of the brain with full neurology evaluation.





__________________________________________

Neri Griffin MD





DD:  02/25/2018 9:28:12

DT:  02/25/2018 11:13:36

Job # [id number]

## 2018-02-26 LAB
ALBUMIN SERPL-MCNC: 2.7 G/DL (ref 3.5–5)
ALBUMIN/GLOB SERPL: 0.8 {RATIO} (ref 1–2.1)
ALT SERPL-CCNC: 36 U/L (ref 21–72)
AST SERPL-CCNC: 48 U/L (ref 17–59)
BASOPHILS # BLD AUTO: 0.1 K/UL (ref 0–0.2)
BASOPHILS NFR BLD: 1 % (ref 0–2)
BUN SERPL-MCNC: 9 MG/DL (ref 9–20)
CALCIUM SERPL-MCNC: 9 MG/DL (ref 8.6–10.4)
EOSINOPHIL # BLD AUTO: 0.4 K/UL (ref 0–0.7)
EOSINOPHIL NFR BLD: 6.6 % (ref 0–4)
ERYTHROCYTE [DISTWIDTH] IN BLOOD BY AUTOMATED COUNT: 21.9 % (ref 11.5–14.5)
GFR NON-AFRICAN AMERICAN: 55
HGB BLD-MCNC: 10.1 G/DL (ref 12–18)
LYMPHOCYTES # BLD AUTO: 1.4 K/UL (ref 1–4.3)
LYMPHOCYTES NFR BLD AUTO: 23.9 % (ref 20–40)
MCH RBC QN AUTO: 27.9 PG (ref 27–31)
MCHC RBC AUTO-ENTMCNC: 32.3 G/DL (ref 33–37)
MCV RBC AUTO: 86.4 FL (ref 80–94)
MONOCYTES # BLD: 0.3 K/UL (ref 0–0.8)
MONOCYTES NFR BLD: 4.8 % (ref 0–10)
NEUTROPHILS # BLD: 3.7 K/UL (ref 1.8–7)
NEUTROPHILS NFR BLD AUTO: 63.7 % (ref 50–75)
NRBC BLD AUTO-RTO: 0.1 % (ref 0–2)
PLATELET # BLD: 80 K/UL (ref 130–400)
PMV BLD AUTO: 9 FL (ref 7.2–11.7)
RBC # BLD AUTO: 3.63 MIL/UL (ref 4.4–5.9)
WBC # BLD AUTO: 5.9 K/UL (ref 4.8–10.8)

## 2018-02-26 RX ADMIN — SUCRALFATE SCH GM: 1 SUSPENSION ORAL at 22:01

## 2018-02-26 RX ADMIN — PANTOPRAZOLE SODIUM SCH MG: 40 TABLET, DELAYED RELEASE ORAL at 11:00

## 2018-02-26 RX ADMIN — HUMAN INSULIN SCH UNIT: 100 INJECTION, SOLUTION SUBCUTANEOUS at 22:02

## 2018-02-26 RX ADMIN — Medication SCH TAB: at 11:00

## 2018-02-26 RX ADMIN — SUCRALFATE SCH GM: 1 SUSPENSION ORAL at 08:30

## 2018-02-26 RX ADMIN — SUCRALFATE SCH: 1 SUSPENSION ORAL at 22:05

## 2018-02-26 RX ADMIN — HUMAN INSULIN SCH UNIT: 100 INJECTION, SOLUTION SUBCUTANEOUS at 12:26

## 2018-02-26 RX ADMIN — POLYVINYL ALCOHOL SCH DROP: 14 SOLUTION/ DROPS OPHTHALMIC at 11:00

## 2018-02-26 RX ADMIN — HUMAN INSULIN SCH UNIT: 100 INJECTION, SOLUTION SUBCUTANEOUS at 08:30

## 2018-02-26 RX ADMIN — HUMAN INSULIN SCH UNIT: 100 INJECTION, SOLUTION SUBCUTANEOUS at 17:30

## 2018-02-26 RX ADMIN — SUCRALFATE SCH GM: 1 SUSPENSION ORAL at 17:30

## 2018-02-26 RX ADMIN — SUCRALFATE SCH GM: 1 SUSPENSION ORAL at 11:15

## 2018-02-26 NOTE — PN
DATE:  02/24/2018.



LOCATION:  [facility], bed B.



SUBJECTIVE:  This is a 68 years old male seen and examined in rounds with

the staff in the floor early this morning, without significant clinical

changes or reported active bleeding, with period of mild semi confusion and

recurrent episode of poorly controlled hypertension.



The entire chart is reviewed including, but not limited to the most recent

lab and radiologic study results, current and previous medication list,

current and previous medical events and today's lab showed blood glucose

level of 265.  The patient still has low hemoglobin and hematocrit with low

albumin and total protein, but with subsequent mild increase of oral

intake.



PHYSICAL EXAMINATION:

GENERAL:  A 68 years old male without reported chest pain or palpitations

or significant shortness of breath as per the nursing staff.

VITAL SIGNS:  Afebrile with pulse of 60, respiratory rate of 20 to 22,

blood pressure 140/74.

HEENT:  Showed pale, dry oral mucous membrane.  Nonicteric sclerae.

LUNGS:  Few scattered crepitation.  Decreased air entry at bases.

HEART:  Positive S1 and S2.

ABDOMEN:  Soft with mild generalized tenderness.  No mass or organomegaly. 

No rebound tenderness or guarding.  Abdominal distention was noted.

EXTREMITIES:  With slight lower extremity edematous changes..  No clubbing

or cyanosis.

NEUROLOGIC:  No reported new neurological deficits, sensory or motor. 

Peripheral pulses are present bilaterally.



IMPRESSION:

1.  Alcoholism.

2.  Alcoholic liver disease.

3.  Anemia.

4.  Peptic ulcer disease.

5.  Recent history of gastrointestinal blood loss.

6.  Poorly controlled diabetes mellitus.

7.  Poorly controlled hypertension.



SUGGESTIONS:

1.  Continue current management.

2.  _____ abdominal and pelvic CAT scan.

3.  Cardiology followup.

4.  Further recommendation to follow.







__________________________________________

Neri Griffin MD





DD:  02/24/2018 7:25:56

DT:  02/24/2018 9:20:12

Job # [id number]

## 2018-02-26 NOTE — CP.PCM.PN
Subjective





- Date & Time of Evaluation


Date of Evaluation: 02/26/18


Time of Evaluation: 07:00





- Subjective


Subjective: 


PGY1 Medicine Note for Dr. Fung





Patient seen and examined this morning. Patient was difficult to wake up in the 

morning and is still experiencing episodes of confusion. ROS unattainable.








Objective





- Vital Signs/Intake and Output


Vital Signs (last 24 hours): 


 











Temp Pulse Resp BP Pulse Ox


 


 97.4 F L  63   20   163/91 H  92 L


 


 02/26/18 01:56  02/26/18 01:56  02/26/18 01:56  02/26/18 01:56  02/26/18 01:56








Intake and Output: 


 











 02/26/18 02/26/18





 06:59 18:59


 


Intake Total 300 


 


Balance 300 














- Medications


Medications: 


 Current Medications





Amlodipine Besylate (Norvasc)  5 mg PO Q24H [facility]


   Last Admin: 02/25/18 20:19 Dose:  5 mg


Artificial Tears (Artificial Tears)  0 ml OD DAILY [facility]


   Last Admin: 02/25/18 10:16 Dose:  1 drop


Folic Acid (Folic Acid)  1 mg PO DAILY [facility]


   Last Admin: 02/25/18 09:53 Dose:  1 mg


Hydralazine HCl (Apresoline)  10 mg IVP Q6H [facility]


   PRN Reason: SBP >160


   Last Admin: 02/24/18 22:49 Dose:  10 mg


Insulin Human Regular (Novolin R)  0 unit SC ACHS [facility]


   PRN Reason: Protocol


   Last Admin: 02/26/18 08:30 Dose:  6 unit


Lactulose (Enulose)  20 gm PO Q12H [facility]


   Last Admin: 02/26/18 00:52 Dose:  20 gm


Lisinopril (Zestril)  40 mg PO DAILY [facility]


   Last Admin: 02/25/18 09:53 Dose:  40 mg


Multivitamins (Hexavitamin)  1 tab PO DAILY [facility]


   Last Admin: 02/25/18 09:53 Dose:  1 tab


Ondansetron HCl (Zofran Inj)  4 mg IVP Q6 [facility]


   Last Admin: 02/26/18 06:03 Dose:  4 mg


Pantoprazole Sodium (Protonix Ec Tab)  40 mg PO DAILY [facility]


   Last Admin: 02/25/18 09:59 Dose:  40 mg


Potassium Chloride (K-Dur 20 Meq Er Tab)  40 meq PO DAILY [facility]


   Last Admin: 02/23/18 09:45 Dose:  40 meq


Propranolol HCl (Inderal)  20 mg PO TID [facility]


   Last Admin: 02/25/18 17:30 Dose:  20 mg


Sucralfate (Carafate Oral Susp)  1 gm PO ACHS [facility]


   Last Admin: 02/26/18 08:30 Dose:  1 gm


Thiamine HCl (Vitamin B1 Tab)  100 mg PO DAILY [facility]


   Last Admin: 02/25/18 09:53 Dose:  100 mg











- Labs


Labs: 


 





 02/26/18 08:04 





 02/26/18 08:04 





 











PT  15.2 SECONDS (9.7-12.2)  H  02/20/18  08:00    


 


INR  1.3   02/20/18  08:00    


 


APTT  38 SECONDS (21-34)  H  02/20/18  08:00    














- Additional Findings


Additional findings: 





- Constitutional


Appears: Non-toxic, No Acute Distress





- Head Exam


Head Exam: ATRAUMATIC, NORMOCEPHALIC





- Eye Exam


Eye Exam: miosis





- ENT Exam


ENT Exam: Mucous Membranes Moist





- Respiratory Exam


Respiratory Exam: Clear to Ausculation Bilateral, NORMAL BREATHING PATTERN.  

absent: Accessory Muscle Use, Rales, Rhonchi, Wheezes, Respiratory Distress





- Cardiovascular Exam


Cardiovascular Exam: REGULAR RHYTHM, +S1, +S2





- GI/Abdominal Exam


GI & Abdominal Exam: Soft, Normal Bowel Sounds.  absent: Distended, Firm, 

Guarding, Rigid





- Extremities Exam


Extremities Exam: Normal Capillary Refill, Normal Inspection.  absent: Calf 

Tenderness, Tenderness





- Neurological Exam


Neurological Exam: Alert, Awake.  absent: Oriented x3


Neuro motor strength exam: Left Upper Extremity: 5, Right Upper Extremity: 5, 

Left Lower Extremity: 5, Right Lower Extremity: 5





- Psychiatric Exam


Psychiatric exam: Normal Mood





- Skin


Skin Exam: Dry, Warm





Assessment and Plan





- Assessment and Plan (Free Text)


Assessment: 





GI bleed; resolved and ruled out 


- GI consulted, Dr. Quintanilla; recs appreciated


- Endoscopy (1/26/18) showed candidiasis esophagitis, gastric ulcer (biopsied); 

erythematous mucosa in prepyloric region of stomach and erythematous 

duodenopathy. 


- Colonscopy (1/30/18) showed colitis, diverticulitis, internal hemorrhoids, 

and chronic inflammation. 


- Continue Protonix drip


- Continue NS@120mls/hr


- Neomycin Sulfate 500mg PO q6h


- Per GI note, patient may be candidate for possible PEG.


* Patient is currently eating, advanced to diabetic diet 





Acute Renal Failure; chronic CKD 


- Upon Admission Cr. 4.6


- Cr 1.3 on 2/26


- Lisinopril and Lasix held initially as it could have be contributing to Acute 

Kidney Injury


* Lisinopril 40mg PO daily


* Amlodipine 5mg PO HS 





Alcoholic Liver Cirrhosis


- Ammonia 29 (2/16)


- Lactulose 20gm PO q12h





Hypertension;chronic 


- improving


- Lisinopril 40mg PO daily


- Amlodipine 5mg PO HS


- Hydralazine 10mg IVP q6h prn for SBP >160





Diabetes


- ISS - increased from med to high dose


- Accuchecks ACHS


- Levemir 10 u HS (added on 2/26)





PPX


- Folic acid 1 mg PO daily


- Multivitamin 1 tab PO daily


- Thiamine 100mg PO daily


- Potassium 40meq PO daily (takes at home) - on hold, today K+ 4.4


- PT





DISPO: Patient came to ER from [facility], discharge patient back to [facility] however 

patient family does not like last rehab center and refuses to let him go back. 

Working with social work to get placed into different facility. The patient is 

stable for d/c as soon as [facility] is set up for patient. No update at this time.





Case discussed with Dr. Antonieta Pascual, PGY1

## 2018-02-26 NOTE — PN
DATE:



LOCATION:  Room 558, bed B.



SUBJECTIVE:  This is a 68-year-old male, seen and examined in rounds

without significant clinical changes or reported active bleeding appear to

be some confused with period of disorientation and being slightly agitated.

No reported chest pain, palpitation, significant shortness of breath or

evidence of active bleeding.



The entire chart is reviewed including, but not limited to the most recent

lab and radiology study results, current and previous medication list,

current and previous medical events.  The case discussed with the staff at

length.  Today's lab showed hemoglobin 10.1, hematocrit 31.4 with

thrombocytopenia of 80.  Blood glucose level 312, low albumin 2.7, low

total protein 6.0.



PHYSICAL EXAMINATION

GENERAL:  A 68-year-old male.

VITAL SIGNS:  Afebrile with pulse of 60, respiratory rate 20 to 22, blood

pressure 156/86.

HEENT:  Shows pale dry oral mucous membrane.  Nonicteric sclerae.

LUNGS:  Few scattered crepitation, decreased air entry at bases.

HEART:  Positive S1 and S2.

ABDOMEN:  Soft with mild generalized tenderness.  No mass or organomegaly. 

No rebound tenderness or guarding.

EXTREMITIES:  With slight lower extremity edematous changes.  No clubbing

or cyanosis.  It has to be mentioned that the patient reported difficulty

to walk up for no specific reason.

NEUROLOGIC:  No reported new neurological deficits, sensory or motor.



IMPRESSION:

1.  Reported gastrointestinal bleeding, none recently.

2.  Known history of hypertension, diabetes mellitus with renal

insufficiency.

3.  Anemia, most likely secondary to above.

4.  Alcoholism with alcoholic liver disease.

5.  Electrolyte imbalance.

6.  Malnutrition with hypoalbuminemia, high proteinemia.

7.  Mild change of mental status with confusion that could be secondary to

mild hepatic encephalopathy.



SUGGESTION:

1.  Continue current management.

2.  Follow up in ammonia level.

3.  Neurology consult with CAT scan of the head.

4.  Further recommendation to follow and the patient will need physical

therapy.





__________________________________________

Neri Griffin MD



DD:  02/26/2018 10:27:02

DT:  02/26/2018 12:09:33

Job # [id number]

## 2018-02-27 VITALS
DIASTOLIC BLOOD PRESSURE: 68 MMHG | TEMPERATURE: 97.2 F | OXYGEN SATURATION: 96 % | HEART RATE: 60 BPM | SYSTOLIC BLOOD PRESSURE: 136 MMHG

## 2018-02-27 LAB
ALBUMIN SERPL-MCNC: 2.8 G/DL (ref 3.5–5)
ALBUMIN/GLOB SERPL: 0.9 {RATIO} (ref 1–2.1)
ALT SERPL-CCNC: 36 U/L (ref 21–72)
AST SERPL-CCNC: 51 U/L (ref 17–59)
BASOPHILS # BLD AUTO: 0.1 K/UL (ref 0–0.2)
BASOPHILS NFR BLD: 1.1 % (ref 0–2)
BUN SERPL-MCNC: 12 MG/DL (ref 9–20)
CALCIUM SERPL-MCNC: 9.2 MG/DL (ref 8.6–10.4)
EOSINOPHIL # BLD AUTO: 0.5 K/UL (ref 0–0.7)
EOSINOPHIL NFR BLD: 6.4 % (ref 0–4)
ERYTHROCYTE [DISTWIDTH] IN BLOOD BY AUTOMATED COUNT: 20.5 % (ref 11.5–14.5)
GFR NON-AFRICAN AMERICAN: 50
HGB BLD-MCNC: 10.2 G/DL (ref 12–18)
LYMPHOCYTES # BLD AUTO: 1.5 K/UL (ref 1–4.3)
LYMPHOCYTES NFR BLD AUTO: 20 % (ref 20–40)
MCH RBC QN AUTO: 27.9 PG (ref 27–31)
MCHC RBC AUTO-ENTMCNC: 32.8 G/DL (ref 33–37)
MCV RBC AUTO: 85 FL (ref 80–94)
MONOCYTES # BLD: 0.3 K/UL (ref 0–0.8)
MONOCYTES NFR BLD: 4.2 % (ref 0–10)
NEUTROPHILS # BLD: 5 K/UL (ref 1.8–7)
NEUTROPHILS NFR BLD AUTO: 68.3 % (ref 50–75)
NRBC BLD AUTO-RTO: 0.1 % (ref 0–2)
PLATELET # BLD: 93 K/UL (ref 130–400)
PMV BLD AUTO: 9 FL (ref 7.2–11.7)
RBC # BLD AUTO: 3.65 MIL/UL (ref 4.4–5.9)
WBC # BLD AUTO: 7.4 K/UL (ref 4.8–10.8)

## 2018-02-27 RX ADMIN — SUCRALFATE SCH GM: 1 SUSPENSION ORAL at 06:36

## 2018-02-27 RX ADMIN — SUCRALFATE SCH GM: 1 SUSPENSION ORAL at 17:25

## 2018-02-27 RX ADMIN — SUCRALFATE SCH GM: 1 SUSPENSION ORAL at 11:20

## 2018-02-27 RX ADMIN — HUMAN INSULIN SCH UNIT: 100 INJECTION, SOLUTION SUBCUTANEOUS at 13:30

## 2018-02-27 RX ADMIN — HUMAN INSULIN SCH UNIT: 100 INJECTION, SOLUTION SUBCUTANEOUS at 08:24

## 2018-02-27 RX ADMIN — POLYVINYL ALCOHOL SCH DROP: 14 SOLUTION/ DROPS OPHTHALMIC at 10:03

## 2018-02-27 RX ADMIN — HUMAN INSULIN SCH UNIT: 100 INJECTION, SOLUTION SUBCUTANEOUS at 17:10

## 2018-02-27 RX ADMIN — Medication SCH TAB: at 10:03

## 2018-02-27 RX ADMIN — PANTOPRAZOLE SODIUM SCH MG: 40 TABLET, DELAYED RELEASE ORAL at 10:03

## 2018-02-27 NOTE — CP.PCM.DIS
Provider





- Provider


Date of Admission: 


02/13/18 05:35





Attending physician: 


Marlo Fung Jr, MD





Time Spent in preparation of Discharge (in minutes): 30





Hospital Course





- Lab Results


Lab Results: 


 Micro Results





02/15/18 07:37   Naris   MRSA Culture - Final


                            MRSA NOT DETECTED


02/13/18 10:28   Stool   Stool Culture - Final


                            NO SALMONELLA, SHIGELLA OR CAMPYLOBACTER ISOLATED.


02/13/18 10:28   Stool   Ova and Parasite Concentrate Exam - Final


02/13/18 19:45   Nose   MRSA Culture (Admit) - Final


                            MRSA NOT DETECTED





 Most Recent Lab Values











WBC  7.4 K/uL (4.8-10.8)   02/27/18  07:45    


 


RBC  3.65 Mil/uL (4.40-5.90)  L  02/27/18  07:45    


 


Hgb  10.2 g/dL (12.0-18.0)  L  02/27/18  07:45    


 


Hct  31.1 % (35.0-51.0)  L  02/27/18  07:45    


 


MCV  85.0 fL (80.0-94.0)   02/27/18  07:45    


 


MCH  27.9 pg (27.0-31.0)   02/27/18  07:45    


 


MCHC  32.8 g/dL (33.0-37.0)  L  02/27/18  07:45    


 


RDW  20.5 % (11.5-14.5)  H  02/27/18  07:45    


 


Plt Count  93 K/uL (130-400)  L  02/27/18  07:45    


 


MPV  9.0 fL (7.2-11.7)   02/27/18  07:45    


 


Neut % (Auto)  68.3 % (50.0-75.0)   02/27/18  07:45    


 


Lymph % (Auto)  20.0 % (20.0-40.0)   02/27/18  07:45    


 


Mono % (Auto)  4.2 % (0.0-10.0)   02/27/18  07:45    


 


Eos % (Auto)  6.4 % (0.0-4.0)  H  02/27/18  07:45    


 


Baso % (Auto)  1.1 % (0.0-2.0)   02/27/18  07:45    


 


Neut # (Auto)  5.0 K/uL (1.8-7.0)   02/27/18  07:45    


 


Lymph # (Auto)  1.5 K/uL (1.0-4.3)   02/27/18  07:45    


 


Mono # (Auto)  0.3 K/uL (0.0-0.8)   02/27/18  07:45    


 


Eos # (Auto)  0.5 K/uL (0.0-0.7)   02/27/18  07:45    


 


Baso # (Auto)  0.1 K/uL (0.0-0.2)   02/27/18  07:45    


 


Neutrophils % (Manual)  87 % (50-75)  H  02/13/18  04:37    


 


Band Neutrophils %  1 % (0-2)   02/13/18  04:37    


 


Lymphocytes % (Manual)  7 % (20-40)  L  02/13/18  04:37    


 


Monocytes % (Manual)  4 % (0-10)   02/13/18  04:37    


 


Eosinophils % (Manual)  1 % (0-4)   02/13/18  04:37    


 


Differential Comment     02/21/18  08:23    


 


Platelet Estimate  Normal  (NORMAL)   02/13/18  04:37    


 


PT  15.2 SECONDS (9.7-12.2)  H  02/20/18  08:00    


 


INR  1.3   02/20/18  08:00    


 


APTT  38 SECONDS (21-34)  H  02/20/18  08:00    


 


Sodium  140 mmol/L (132-148)   02/27/18  07:45    


 


Potassium  4.4 mmol/L (3.6-5.2)   02/27/18  07:45    


 


Chloride  107 mmol/L ()   02/27/18  07:45    


 


Carbon Dioxide  27 mmol/L (22-30)   02/27/18  07:45    


 


Anion Gap  11  (10-20)   02/27/18  07:45    


 


BUN  12 mg/dL (9-20)   02/27/18  07:45    


 


Creatinine  1.4 mg/dL (0.8-1.5)   02/27/18  07:45    


 


Est GFR ( Amer)  > 60   02/27/18  07:45    


 


Est GFR (Non-Af Amer)  50   02/27/18  07:45    


 


POC Glucose (mg/dL)  411 mg/dL ()  H*  02/27/18  15:47    


 


Random Glucose  240 mg/dL ()  H  02/27/18  07:45    


 


Calcium  9.2 mg/dl (8.6-10.4)   02/27/18  07:45    


 


Total Bilirubin  0.6 mg/dL (0.2-1.3)   02/27/18  07:45    


 


AST  51 U/L (17-59)   02/27/18  07:45    


 


ALT  36 U/L (21-72)   02/27/18  07:45    


 


Alkaline Phosphatase  158 U/L ()  H  02/27/18  07:45    


 


Ammonia  23 umol/L (9-33)   02/24/18  11:38    


 


Total Protein  6.1 g/dL (6.3-8.3)  L  02/27/18  07:45    


 


Albumin  2.8 g/dL (3.5-5.0)  L  02/27/18  07:45    


 


Globulin  3.3 gm/dL (2.2-3.9)   02/27/18  07:45    


 


Albumin/Globulin Ratio  0.9  (1.0-2.1)  L  02/27/18  07:45    


 


Urine Color  Yellow  (YELLOW)   02/13/18  14:05    


 


Urine Clarity  Hazy  (Clear)   02/13/18  14:05    


 


Urine pH  5.0  (5.0-8.0)   02/13/18  14:05    


 


Ur Specific Gravity  1.017  (1.003-1.030)   02/13/18  14:05    


 


Urine Protein  2+ mg/dL (NEGATIVE)  H  02/13/18  14:05    


 


Urine Glucose (UA)  2+ mg/dL (Normal)  H  02/13/18  14:05    


 


Urine Ketones  Trace mg/dL (NEGATIVE)   02/13/18  14:05    


 


Urine Blood  2+  (NEGATIVE)  H  02/13/18  14:05    


 


Urine Nitrate  Negative  (NEGATIVE)   02/13/18  14:05    


 


Urine Bilirubin  Negative  (NEGATIVE)   02/13/18  14:05    


 


Urine Urobilinogen  Normal mg/dL (0.2-1.0)   02/13/18  14:05    


 


Ur Leukocyte Esterase  1+ Dahlia/uL (Negative)  H  02/13/18  14:05    


 


Urine WBC (Auto)  7 /hpf (0-5)  H  02/13/18  14:05    


 


Urine RBC (Auto)  16 /hpf (0-3)  H  02/13/18  14:05    


 


Ur Squamous Epith Cells  6 /hpf (0-5)  H  02/13/18  14:05    


 


Urine Bacteria  Rare  (<OCC)   02/13/18  14:05    


 


Hyaline Casts  3-5 /lpf (0-2)  H  02/13/18  14:05    


 


Stool Occult Blood  Negative  (NEGATIVE)   02/15/18  21:21    


 


Serum Ketones  Small  (NEGATIVE)   02/13/18  04:37    


 


C. difficile Ag & Toxin    (NEGATIVE)   02/13/18  09:00    


 


Blood Type  O POSITIVE   02/13/18  04:37    


 


Antibody Screen  Negative   02/13/18  04:37    














- Hospital Course


Hospital Course: 





As per admission documentation,





Patient is a 67 year old male with ETOH abuse, ETOH pancreatitis, DM, HTN, who 

presents to the ED sent from the [facility] for vomiting. Patient is a poor 

historian and family is not at bedside. History obtained from ED physician. 

Patient vomited three times, the third time was described as brown, coffee 

ground emesis. Patient did not vomit while in the ED. Patient admits to 

vomiting and epigastric pain, otherwise has no other complaints.  Patient 

denies chest pain, shortness of breath, nausea, fevers, and leg pain. Patient 

is awake and confused.  





Hospital Course,





The patient was admitted to the ED from a nursing home on 2/13/18 for 

epigastric pain, nausea, vomiting coffee ground emesis. From history and as per 

the EMR, the patient was known to have a history of EtOH abuse. In the ED, labs 

were drawn for blood work, CXR imaging was ordered, EKG was performed, IV 

fluids were given, and IV Ondansetron and Pantoprazole were given. EKG revealed 

normal sinus rhythm and CXR was unremarkable. In the ED, the patient became 

hypotensive and hypothermic, Dr. Perry from ICU was subsequency consulted and 

assessed the patient with dehydration and NITISH secondary to Lasix treatment at 

the nursing home. The patient was treated with IV fluids, antibiotics, and 

monitored for urine output. On 2/15/18, Dr. Quintanilla from GI was consulted for the 

patients coffee-ground emesis and epigastric pain. Dr. Quintanilla assessed the 

patient with recurrent upper GI blood loss secondary to erosive gastritis and 

reexacerbation of acute pancreatitis. The patient was put on Sucralfate P.O., 

PPI Iv, Reglan IV and monitored for labs and vitals. Over time, the patient was 

in and out of altered mental status (due to elevated ammonia levels) but slowly 

improved with Lactulose. His blood pressure was also high over night on several 

occasions and Hydralazine was administered to bring it down, although he was 

asymptomatic. On date of discharge, 2/27/18, the patient no longer complains of 

abdominal pain, denies N/V, is AAO x3, and has no other complaints. He is 

currently tolerating food and liquids. He is to be discharged to [facility] in 

[place] with medications for his current conditions including chronic CKD, 

alcoholic liver cirrhosis, DM, and HTN. 





Discharge instructions,





Patient to be discharged to [facility]. Patient is to continue taking medications as 

previously directed by his primary care physician. Upon discharge from [facility], 

patient is to follow up with his PMD, Dr. Fung, within 1-2 weeks. Patient is to 

follow up with Dr. Quintanilla (GI), as needed. If any new or worsening symptoms, 

please go to the nearest emergency facility. 





This is a brief summary of the medical course, please see the medical records 

for a detailed course. 





- Constitutional


Appears: Non-toxic, No Acute Distress





- Head Exam


Head Exam: ATRAUMATIC, NORMOCEPHALIC





- Eye Exam


Eye Exam: miosis





- ENT Exam


ENT Exam: Mucous Membranes Moist





- Respiratory Exam


Respiratory Exam: Clear to Ausculation Bilateral, NORMAL BREATHING PATTERN.  

absent: Accessory Muscle Use, Rales, Rhonchi, Wheezes, Respiratory Distress





- Cardiovascular Exam


Cardiovascular Exam: REGULAR RHYTHM, +S1, +S2





- GI/Abdominal Exam


GI & Abdominal Exam: Soft, Normal Bowel Sounds.  absent: Distended, Firm, 

Guarding, Rigid





- Extremities Exam


Extremities Exam: Normal Capillary Refill, Normal Inspection.  absent: Calf 

Tenderness, Tenderness





- Neurological Exam


Neurological Exam: Alert, Awake, Oriented x3


Neuro motor strength exam: Left Upper Extremity: 5, Right Upper Extremity: 5, 

Left Lower Extremity: 5, Right Lower Extremity: 5





- Psychiatric Exam


Psychiatric exam: Normal Mood





- Skin


Skin Exam: Dry, Warm





Discharge Exam





- Head Exam


Head Exam: ATRAUMATIC, NORMOCEPHALIC





Discharge Plan





- Follow Up Plan


Condition: FAIR


Disposition: REHAB FACILITY/REHAB UNIT


Instructions:  Gastrointestinal Bleeding (DC), Acute Kidney Failure (DC)


Additional Instructions: 


Patient to be discharged to [facility]. Patient is to continue taking medications as 

previously directed by his primary care physician. Upon discharge from [facility], 

patient is to follow up with his PMD, Dr. Fung, within 1-2 weeks. Patient is to 

follow up with Dr. Quintanilla (GI), as needed. If any new or worsening symptoms, 

please go to the nearest emergency facility. 


Referrals: 


Neri Quintanilla [Staff Provider] - 


Marlo Fung Jr., MD [Emergency Provider] -